# Patient Record
Sex: FEMALE | Race: WHITE | NOT HISPANIC OR LATINO | Employment: OTHER | URBAN - METROPOLITAN AREA
[De-identification: names, ages, dates, MRNs, and addresses within clinical notes are randomized per-mention and may not be internally consistent; named-entity substitution may affect disease eponyms.]

---

## 2017-02-01 ENCOUNTER — HOSPITAL ENCOUNTER (INPATIENT)
Facility: MEDICAL CENTER | Age: 62
LOS: 7 days | DRG: 101 | End: 2017-02-08
Attending: EMERGENCY MEDICINE | Admitting: INTERNAL MEDICINE
Payer: MEDICAID

## 2017-02-01 ENCOUNTER — RESOLUTE PROFESSIONAL BILLING HOSPITAL PROF FEE (OUTPATIENT)
Dept: PULMONOLOGY | Facility: HOSPICE | Age: 62
End: 2017-02-01
Payer: MEDICAID

## 2017-02-01 ENCOUNTER — APPOINTMENT (OUTPATIENT)
Dept: RADIOLOGY | Facility: MEDICAL CENTER | Age: 62
DRG: 101 | End: 2017-02-01
Attending: EMERGENCY MEDICINE
Payer: MEDICAID

## 2017-02-01 DIAGNOSIS — R53.81 DEBILITY: ICD-10-CM

## 2017-02-01 DIAGNOSIS — E87.1 HYPONATREMIA: ICD-10-CM

## 2017-02-01 DIAGNOSIS — R41.82 ALTERED MENTAL STATUS, UNSPECIFIED ALTERED MENTAL STATUS TYPE: ICD-10-CM

## 2017-02-01 PROBLEM — R40.4 ALTERED CONSCIOUSNESS: Status: ACTIVE | Noted: 2017-02-01

## 2017-02-01 LAB
ALBUMIN SERPL BCP-MCNC: 2.7 G/DL (ref 3.2–4.9)
ALBUMIN SERPL BCP-MCNC: 3.6 G/DL (ref 3.2–4.9)
ALBUMIN/GLOB SERPL: 1.2 G/DL
ALP SERPL-CCNC: 71 U/L (ref 30–99)
ALT SERPL-CCNC: 29 U/L (ref 2–50)
AMPHET UR QL SCN: NEGATIVE
ANION GAP SERPL CALC-SCNC: 10 MMOL/L (ref 0–11.9)
ANION GAP SERPL CALC-SCNC: 8 MMOL/L (ref 0–11.9)
APPEARANCE UR: CLEAR
AST SERPL-CCNC: 76 U/L (ref 12–45)
BACTERIA #/AREA URNS HPF: ABNORMAL /HPF
BARBITURATES UR QL SCN: NEGATIVE
BASOPHILS # BLD AUTO: 0.1 % (ref 0–1.8)
BASOPHILS # BLD: 0.01 K/UL (ref 0–0.12)
BENZODIAZ UR QL SCN: NEGATIVE
BILIRUB SERPL-MCNC: 1.2 MG/DL (ref 0.1–1.5)
BILIRUB UR QL STRIP.AUTO: NEGATIVE
BUN SERPL-MCNC: 6 MG/DL (ref 8–22)
BUN SERPL-MCNC: 7 MG/DL (ref 8–22)
BZE UR QL SCN: NEGATIVE
CALCIUM SERPL-MCNC: 6.7 MG/DL (ref 8.5–10.5)
CALCIUM SERPL-MCNC: 7.9 MG/DL (ref 8.5–10.5)
CANNABINOIDS UR QL SCN: POSITIVE
CHLORIDE SERPL-SCNC: 78 MMOL/L (ref 96–112)
CHLORIDE SERPL-SCNC: 90 MMOL/L (ref 96–112)
CHLORIDE UR-SCNC: 47 MMOL/L
CO2 SERPL-SCNC: 19 MMOL/L (ref 20–33)
CO2 SERPL-SCNC: 20 MMOL/L (ref 20–33)
COLOR UR: YELLOW
CREAT SERPL-MCNC: 0.21 MG/DL (ref 0.5–1.4)
CREAT SERPL-MCNC: 0.22 MG/DL (ref 0.5–1.4)
CREAT UR-MCNC: 44.6 MG/DL
CULTURE IF INDICATED INDCX: NO UA CULTURE
EOSINOPHIL # BLD AUTO: 0 K/UL (ref 0–0.51)
EOSINOPHIL NFR BLD: 0 % (ref 0–6.9)
ERYTHROCYTE [DISTWIDTH] IN BLOOD BY AUTOMATED COUNT: 38.5 FL (ref 35.9–50)
GFR SERPL CREATININE-BSD FRML MDRD: >60 ML/MIN/1.73 M 2
GFR SERPL CREATININE-BSD FRML MDRD: >60 ML/MIN/1.73 M 2
GLOBULIN SER CALC-MCNC: 2.9 G/DL (ref 1.9–3.5)
GLUCOSE SERPL-MCNC: 120 MG/DL (ref 65–99)
GLUCOSE SERPL-MCNC: 91 MG/DL (ref 65–99)
GLUCOSE UR STRIP.AUTO-MCNC: NEGATIVE MG/DL
GRAN CASTS #/AREA URNS LPF: ABNORMAL /LPF
HCT VFR BLD AUTO: 29 % (ref 37–47)
HGB BLD-MCNC: 10.5 G/DL (ref 12–16)
HYALINE CASTS #/AREA URNS LPF: ABNORMAL /LPF
IMM GRANULOCYTES # BLD AUTO: 0.06 K/UL (ref 0–0.11)
IMM GRANULOCYTES NFR BLD AUTO: 0.6 % (ref 0–0.9)
KETONES UR STRIP.AUTO-MCNC: 60 MG/DL
LEUKOCYTE ESTERASE UR QL STRIP.AUTO: NEGATIVE
LYMPHOCYTES # BLD AUTO: 0.69 K/UL (ref 1–4.8)
LYMPHOCYTES NFR BLD: 7 % (ref 22–41)
MCH RBC QN AUTO: 32.8 PG (ref 27–33)
MCHC RBC AUTO-ENTMCNC: 36.2 G/DL (ref 33.6–35)
MCV RBC AUTO: 90.6 FL (ref 81.4–97.8)
MDMA UR QL SCN: NEGATIVE
METHADONE UR QL SCN: NEGATIVE
MICRO URNS: ABNORMAL
MONOCYTES # BLD AUTO: 1.02 K/UL (ref 0–0.85)
MONOCYTES NFR BLD AUTO: 10.3 % (ref 0–13.4)
NEUTROPHILS # BLD AUTO: 8.08 K/UL (ref 2–7.15)
NEUTROPHILS NFR BLD: 82 % (ref 44–72)
NITRITE UR QL STRIP.AUTO: NEGATIVE
NRBC # BLD AUTO: 0 K/UL
NRBC BLD AUTO-RTO: 0 /100 WBC
OPIATES UR QL SCN: POSITIVE
OSMOLALITY SERPL: 227 MOSM/KG H2O (ref 278–298)
OSMOLALITY UR: 501 MOSM/KG H2O (ref 300–900)
OXYCODONE UR QL SCN: NEGATIVE
PCP UR QL SCN: NEGATIVE
PH UR STRIP.AUTO: 6 [PH]
PLATELET # BLD AUTO: 172 K/UL (ref 164–446)
PMV BLD AUTO: 10.2 FL (ref 9–12.9)
POTASSIUM SERPL-SCNC: 3 MMOL/L (ref 3.6–5.5)
POTASSIUM SERPL-SCNC: 3.7 MMOL/L (ref 3.6–5.5)
POTASSIUM UR-SCNC: 64.1 MMOL/L
PROPOXYPH UR QL SCN: NEGATIVE
PROT SERPL-MCNC: 6.5 G/DL (ref 6–8.2)
PROT UR QL STRIP: 70 MG/DL
PROT UR-MCNC: 113.8 MG/DL (ref 0–15)
RBC # BLD AUTO: 3.2 M/UL (ref 4.2–5.4)
RBC # URNS HPF: ABNORMAL /HPF
RBC UR QL AUTO: ABNORMAL
SODIUM SERPL-SCNC: 108 MMOL/L (ref 135–145)
SODIUM SERPL-SCNC: 117 MMOL/L (ref 135–145)
SODIUM UR-SCNC: 37 MMOL/L
SP GR UR STRIP.AUTO: 1.02
TSH SERPL DL<=0.005 MIU/L-ACNC: 0.95 UIU/ML (ref 0.3–3.7)
WBC # BLD AUTO: 9.7 K/UL (ref 4.8–10.8)
WBC #/AREA URNS HPF: ABNORMAL /HPF

## 2017-02-01 PROCEDURE — 80053 COMPREHEN METABOLIC PANEL: CPT

## 2017-02-01 PROCEDURE — 36556 INSERT NON-TUNNEL CV CATH: CPT | Mod: GC | Performed by: INTERNAL MEDICINE

## 2017-02-01 PROCEDURE — 82436 ASSAY OF URINE CHLORIDE: CPT

## 2017-02-01 PROCEDURE — 36415 COLL VENOUS BLD VENIPUNCTURE: CPT

## 2017-02-01 PROCEDURE — 99291 CRITICAL CARE FIRST HOUR: CPT | Mod: 25,GC | Performed by: INTERNAL MEDICINE

## 2017-02-01 PROCEDURE — 96361 HYDRATE IV INFUSION ADD-ON: CPT

## 2017-02-01 PROCEDURE — 700111 HCHG RX REV CODE 636 W/ 250 OVERRIDE (IP): Performed by: HOSPITALIST

## 2017-02-01 PROCEDURE — 99285 EMERGENCY DEPT VISIT HI MDM: CPT

## 2017-02-01 PROCEDURE — 93005 ELECTROCARDIOGRAM TRACING: CPT | Performed by: EMERGENCY MEDICINE

## 2017-02-01 PROCEDURE — 700105 HCHG RX REV CODE 258: Performed by: EMERGENCY MEDICINE

## 2017-02-01 PROCEDURE — 84443 ASSAY THYROID STIM HORMONE: CPT

## 2017-02-01 PROCEDURE — 80048 BASIC METABOLIC PNL TOTAL CA: CPT

## 2017-02-01 PROCEDURE — 700105 HCHG RX REV CODE 258: Performed by: INTERNAL MEDICINE

## 2017-02-01 PROCEDURE — 82570 ASSAY OF URINE CREATININE: CPT

## 2017-02-01 PROCEDURE — 84156 ASSAY OF PROTEIN URINE: CPT

## 2017-02-01 PROCEDURE — 70450 CT HEAD/BRAIN W/O DYE: CPT

## 2017-02-01 PROCEDURE — 83930 ASSAY OF BLOOD OSMOLALITY: CPT

## 2017-02-01 PROCEDURE — 82040 ASSAY OF SERUM ALBUMIN: CPT

## 2017-02-01 PROCEDURE — 81001 URINALYSIS AUTO W/SCOPE: CPT

## 2017-02-01 PROCEDURE — 84133 ASSAY OF URINE POTASSIUM: CPT

## 2017-02-01 PROCEDURE — 83935 ASSAY OF URINE OSMOLALITY: CPT

## 2017-02-01 PROCEDURE — 80307 DRUG TEST PRSMV CHEM ANLYZR: CPT

## 2017-02-01 PROCEDURE — 84300 ASSAY OF URINE SODIUM: CPT

## 2017-02-01 PROCEDURE — 85025 COMPLETE CBC W/AUTO DIFF WBC: CPT

## 2017-02-01 PROCEDURE — 96374 THER/PROPH/DIAG INJ IV PUSH: CPT

## 2017-02-01 PROCEDURE — 770022 HCHG ROOM/CARE - ICU (200)

## 2017-02-01 PROCEDURE — 31500 INSERT EMERGENCY AIRWAY: CPT | Mod: GC | Performed by: INTERNAL MEDICINE

## 2017-02-01 RX ORDER — LORAZEPAM 2 MG/ML
0.5 INJECTION INTRAMUSCULAR ONCE
Status: DISCONTINUED | OUTPATIENT
Start: 2017-02-01 | End: 2017-02-02

## 2017-02-01 RX ORDER — LORAZEPAM 2 MG/ML
2 INJECTION INTRAMUSCULAR ONCE
Status: COMPLETED | OUTPATIENT
Start: 2017-02-01 | End: 2017-02-01

## 2017-02-01 RX ORDER — 3% SODIUM CHLORIDE 3 G/100ML
250 INJECTION, SOLUTION INTRAVENOUS ONCE
Status: COMPLETED | OUTPATIENT
Start: 2017-02-01 | End: 2017-02-01

## 2017-02-01 RX ORDER — MORPHINE SULFATE 4 MG/ML
2 INJECTION, SOLUTION INTRAMUSCULAR; INTRAVENOUS EVERY 4 HOURS PRN
Status: DISCONTINUED | OUTPATIENT
Start: 2017-02-01 | End: 2017-02-02

## 2017-02-01 RX ORDER — CALCIUM GLUCONATE 94 MG/ML
1 INJECTION, SOLUTION INTRAVENOUS ONCE
Status: DISCONTINUED | OUTPATIENT
Start: 2017-02-01 | End: 2017-02-02

## 2017-02-01 RX ORDER — POTASSIUM CHLORIDE 7.45 MG/ML
10 INJECTION INTRAVENOUS
Status: COMPLETED | OUTPATIENT
Start: 2017-02-02 | End: 2017-02-02

## 2017-02-01 RX ORDER — ROCURONIUM BROMIDE 10 MG/ML
50 INJECTION, SOLUTION INTRAVENOUS ONCE
Status: COMPLETED | OUTPATIENT
Start: 2017-02-02 | End: 2017-02-02

## 2017-02-01 RX ORDER — BISACODYL 10 MG
10 SUPPOSITORY, RECTAL RECTAL
Status: DISCONTINUED | OUTPATIENT
Start: 2017-02-01 | End: 2017-02-08 | Stop reason: HOSPADM

## 2017-02-01 RX ORDER — ENEMA 19; 7 G/133ML; G/133ML
1 ENEMA RECTAL
Status: DISCONTINUED | OUTPATIENT
Start: 2017-02-01 | End: 2017-02-08 | Stop reason: HOSPADM

## 2017-02-01 RX ORDER — LACTULOSE 20 G/30ML
30 SOLUTION ORAL
Status: DISCONTINUED | OUTPATIENT
Start: 2017-02-01 | End: 2017-02-08 | Stop reason: HOSPADM

## 2017-02-01 RX ORDER — LORAZEPAM 2 MG/ML
INJECTION INTRAMUSCULAR
Status: DISPENSED
Start: 2017-02-01 | End: 2017-02-02

## 2017-02-01 RX ORDER — LORAZEPAM 2 MG/ML
1 INJECTION INTRAMUSCULAR
Status: DISCONTINUED | OUTPATIENT
Start: 2017-02-01 | End: 2017-02-02

## 2017-02-01 RX ORDER — LORAZEPAM 2 MG/ML
0.5 INJECTION INTRAMUSCULAR EVERY 6 HOURS PRN
Status: DISCONTINUED | OUTPATIENT
Start: 2017-02-01 | End: 2017-02-01

## 2017-02-01 RX ORDER — AMOXICILLIN 250 MG
1 CAPSULE ORAL
Status: DISCONTINUED | OUTPATIENT
Start: 2017-02-01 | End: 2017-02-08 | Stop reason: HOSPADM

## 2017-02-01 RX ORDER — LORAZEPAM 1 MG/1
0.5 TABLET ORAL EVERY 6 HOURS PRN
Status: DISCONTINUED | OUTPATIENT
Start: 2017-02-01 | End: 2017-02-01

## 2017-02-01 RX ORDER — DOCUSATE SODIUM 100 MG/1
100 CAPSULE, LIQUID FILLED ORAL EVERY MORNING
Status: DISCONTINUED | OUTPATIENT
Start: 2017-02-01 | End: 2017-02-08 | Stop reason: HOSPADM

## 2017-02-01 RX ORDER — TEMAZEPAM 30 MG/1
30 CAPSULE ORAL NIGHTLY PRN
COMMUNITY

## 2017-02-01 RX ORDER — 3% SODIUM CHLORIDE 3 G/100ML
INJECTION, SOLUTION INTRAVENOUS CONTINUOUS
Status: DISCONTINUED | OUTPATIENT
Start: 2017-02-01 | End: 2017-02-01

## 2017-02-01 RX ORDER — AMOXICILLIN 250 MG
1 CAPSULE ORAL NIGHTLY
Status: DISCONTINUED | OUTPATIENT
Start: 2017-02-01 | End: 2017-02-08 | Stop reason: HOSPADM

## 2017-02-01 RX ORDER — PROPOFOL 10 MG/ML
50 INJECTION, EMULSION INTRAVENOUS ONCE
Status: COMPLETED | OUTPATIENT
Start: 2017-02-02 | End: 2017-02-02

## 2017-02-01 RX ORDER — SODIUM CHLORIDE 9 MG/ML
1000 INJECTION, SOLUTION INTRAVENOUS ONCE
Status: COMPLETED | OUTPATIENT
Start: 2017-02-01 | End: 2017-02-01

## 2017-02-01 RX ORDER — HYDROCODONE BITARTRATE AND ACETAMINOPHEN 10; 325 MG/1; MG/1
1-2 TABLET ORAL EVERY 6 HOURS PRN
Status: ON HOLD | COMMUNITY
End: 2017-02-08

## 2017-02-01 RX ADMIN — SODIUM CHLORIDE 1000 ML: 9 INJECTION, SOLUTION INTRAVENOUS at 19:50

## 2017-02-01 RX ADMIN — SODIUM CHLORIDE 250 ML: 3 INJECTION, SOLUTION INTRAVENOUS at 21:15

## 2017-02-01 RX ADMIN — LORAZEPAM 2 MG: 2 INJECTION INTRAMUSCULAR; INTRAVENOUS at 23:35

## 2017-02-01 ASSESSMENT — LIFESTYLE VARIABLES: DO YOU DRINK ALCOHOL: NO

## 2017-02-01 NOTE — IP AVS SNAPSHOT
2/8/2017          Fabiola Chacon  [address] [place] [address]    Dear Fabiola:    [facility] wants to ensure your discharge home is safe and you or your loved ones have had all your questions answered regarding your care after you leave the hospital.    You may receive a telephone call within two days of your discharge.  This call is to make certain you understand your discharge instructions as well as ensure we provided you with the best care possible during your stay with us.     The call will only last approximately 3-5 minutes and will be done by a nurse.    Once again, we want to ensure your discharge home is safe and that you have a clear understanding of any next steps in your care.  If you have any questions or concerns, please do not hesitate to contact us, we are here for you.  Thank you for choosing [facility] for your healthcare needs.    Sincerely,    Leandro Valdovinos    [facility]

## 2017-02-01 NOTE — IP AVS SNAPSHOT
Home Care Instructions                                                                                                                  Name:Fabiola Chacon  Medical Record Number:[id number]  CSN: [id number]    YOB: 1955   Age: 61 y.o.  Sex: female  HT:1.524 m (5') WT: 46.9 kg (103 lb 6.3 oz)          Admit Date: 2/1/2017     Discharge Date:   Today's Date: 2/8/2017  Attending Doctor:  KRYSTYNA Merida                  Allergies:  Codeine; Ultram; and Percocet            Discharge Instructions       Discharge Instructions    Discharged to home by car with escort. Discharged via wheelchair, hospital escort: Yes.  Special equipment needed: Not Applicable    Be sure to schedule a follow-up appointment with your primary care doctor or any specialists as instructed.     Discharge Plan:   Influenza Vaccine Indication: Patient Refuses    I understand that a diet low in cholesterol, fat, and sodium is recommended for good health. Unless I have been given specific instructions below for another diet, I accept this instruction as my diet prescription.   Other diet: Reg      Special Instructions: None    · Is patient discharged on Warfarin / Coumadin?   No     · Is patient Post Blood Transfusion?  No    Depression / Suicide Risk    As you are discharged from this [facility] facility, it is important to learn how to keep safe from harming yourself.    Recognize the warning signs:  · Abrupt changes in personality, positive or negative- including increase in energy   · Giving away possessions  · Change in eating patterns- significant weight changes-  positive or negative  · Change in sleeping patterns- unable to sleep or sleeping all the time   · Unwillingness or inability to communicate  · Depression  · Unusual sadness, discouragement and loneliness  · Talk of wanting to die  · Neglect of personal appearance   · Rebelliousness- reckless behavior  · Withdrawal from people/activities they love  · Confusion-  inability to concentrate     If you or a loved one observes any of these behaviors or has concerns about self-harm, here's what you can do:  · Talk about it- your feelings and reasons for harming yourself  · Remove any means that you might use to hurt yourself (examples: pills, rope, extension cords, firearm)  · Get professional help from the community (Mental Health, Substance Abuse, psychological counseling)  · Do not be alone:Call your Safe Contact- someone whom you trust who will be there for you.  · Call your local CRISIS HOTLINE 331-5209 or 923-301-0001  · Call your local Children's Mobile Crisis Response Team [place] (279) 369-7411 or www.[employer]  · Call the toll free National Suicide Prevention Hotlines   · National Suicide Prevention Lifeline 489-272-HEEH (7419)  · [employer] Line Network 800-SUICIDE (442-8610)           Discharge Medication Instructions:    Below are the medications your physician expects you to take upon discharge:    Review all your home medications and newly ordered medications with your doctor and/or pharmacist. Follow medication instructions as directed by your doctor and/or pharmacist.    Please keep your medication list with you and share with your physician.               Medication List      CONTINUE taking these medications        Instructions    Acetaminophen 650 MG Tabs   Last time this was given:  500 mg on 2/3/2017  4:02 PM    Take 650 mg by mouth every 6 hours as needed for Fever or Mild Pain (Temp greater than 100.5 F or Mild Pain (1-3)).   Dose:  650 mg       hydrocodone/acetaminophen  MG Tabs   Commonly known as:  NORCO    Take 1-2 Tabs by mouth every 6 hours as needed.   Dose:  1-2 Tab       RESTORIL 30 MG capsule   Last time this was given:  15 mg on 2/8/2017  8:07 AM   Generic drug:  temazepam    Take 30 mg by mouth at bedtime as needed for Sleep.   Dose:  30 mg               Instructions           Diet / Nutrition:    Follow any diet  instructions given to you by your doctor or the dietician, including how much salt (sodium) you are allowed each day.    If you are overweight, talk to your doctor about a weight reduction plan.    Activity:    Remain physically active following your doctor's instructions about exercise and activity.    Rest often.     Any time you become even a little tired or short of breath, SIT DOWN and rest.    Worsening Symptoms:    Report any of the following signs and symptoms to the doctor's office immediately:    *Pain of jaw, arm, or neck  *Chest pain not relieved by medication                               *Dizziness or loss of consciousness  *Difficulty breathing even when at rest   *More tired than usual                                       *Bleeding drainage or swelling of surgical site  *Swelling of feet, ankles, legs or stomach                 *Fever (>100ºF)  *Pink or blood tinged sputum  *Weight gain (3lbs/day or 5lbs /week)           *Shock from internal defibrillator (if applicable)  *Palpitations or irregular heartbeats                *Cool and/or numb extremities    Stroke Awareness    Common Risk Factors for Stroke include:    Age  Atrial Fibrillation  Carotid Artery Stenosis  Diabetes Mellitus  Excessive alcohol consumption  High blood pressure  Overweight   Physical inactivity  Smoking    Warning signs and symptoms of a stroke include:    *Sudden numbness or weakness of the face, arm or leg (especially on one side of the body).  *Sudden confusion, trouble speaking or understanding.  *Sudden trouble seeing in one or both eyes.  *Sudden trouble walking, dizziness, loss of balance or coordination.Sudden severe headache with no known cause.    It is very important to get treatment quickly when a stroke occurs. If you experience any of the above warning signs, call 911 immediately.                   Disclaimer         Quit Smoking / Tobacco Use:    I understand the use of any tobacco products increases my  chance of suffering from future heart disease or stroke and could cause other illnesses which may shorten my life. Quitting the use of tobacco products is the single most important thing I can do to improve my health. For further information on smoking / tobacco cessation call a Toll Free Quit Line at 1-621.510.1244 (*National Cancer [place]) or 1-309.380.8744 (American Lung Association) or you can access the web based program at www.lungusa.org.    [place] Tobacco Users Help Line:  (493) 863-9312       Toll Free: 1-833.587.2333  Quit Tobacco Program [facility] (583)849-9634    Crisis Hotline:    [place] Crisis Hotline:  0-218-TURXHMW or 1-911.579.3177    [place] Crisis Hotline:    1-826.274.3583 or 586-932-1500    Discharge Survey:   Thank you for choosing [facility]. We hope we did everything we could to make your hospital stay a pleasant one. You may be receiving a phone survey and we would appreciate your time and participation in answering the questions. Your input is very valuable to us in our efforts to improve our service to our patients and their families.        My signature on this form indicates that:    1. I have reviewed and understand the above information.  2. My questions regarding this information have been answered to my satisfaction.  3. I have formulated a plan with my discharge nurse to obtain my prescribed medications for home.                  Disclaimer         __________________________________                     __________       ________                       Patient Signature                                                 Date                    Time

## 2017-02-01 NOTE — IP AVS SNAPSHOT
G-volution Access Code: [email or web address]  Expires: 3/10/2017  5:39 PM    Your email address is not on file at [facility].  Email Addresses are required for you to sign up for G-volution, please contact [phone] to verify your personal information and to provide your email address prior to attempting to register for G-volution.    Fabiola Craig  [address] [place] [address]    G-volution  A secure, online tool to manage your health information     [facility]’s G-volution® is a secure, online tool that connects you to your personalized health information from the privacy of your home -- day or night - making it very easy for you to manage your healthcare. Once the activation process is completed, you can even access your medical information using the G-volution gerardo, which is available for free in the Apple Gerardo store or Google Play store.     To learn more about G-volution, visit [email or web address]    There are two levels of access available (as shown below):   My Chart Features  [facility] Primary Care Doctor [facility]  Specialists [facility]  Urgent  Care Non-[facility] Primary Care Doctor   Email your healthcare team securely and privately 24/7 X X X    Manage appointments: schedule your next appointment; view details of past/upcoming appointments X      Request prescription refills. X      View recent personal medical records, including lab and immunizations X X X X   View health record, including health history, allergies, medications X X X X   Read reports about your outpatient visits, procedures, consult and ER notes X X X X   See your discharge summary, which is a recap of your hospital and/or ER visit that includes your diagnosis, lab results, and care plan X X  X     How to register for G-volution:  Once your e-mail address has been verified, follow the following steps to sign up for G-volution.     1. Go to  [email or web address]  2. Click on the Sign Up Now box, which takes you to the New Member Sign Up page. You will need  to provide the following information:  a. Enter your Today Tix Access Code exactly as it appears at the top of this page. (You will not need to use this code after you’ve completed the sign-up process. If you do not sign up before the expiration date, you must request a new code.)   b. Enter your date of birth.   c. Enter your home email address.   d. Click Submit, and follow the next screen’s instructions.  3. Create a Today Tix ID. This will be your Today Tix login ID and cannot be changed, so think of one that is secure and easy to remember.  4. Create a Today Tix password. You can change your password at any time.  5. Enter your Password Reset Question and Answer. This can be used at a later time if you forget your password.   6. Enter your e-mail address. This allows you to receive e-mail notifications when new information is available in Today Tix.  7. Click Sign Up. You can now view your health information.    For assistance activating your Today Tix account, call [phone]

## 2017-02-01 NOTE — IP AVS SNAPSHOT
" <p align=\"LEFT\"><IMG SRC=\"//[email or web address]$/[email or web address]\" alt=\"Image\" WIDTH=\"50%\" HEIGHT=\"200\" BORDER=\"\"></p>                   Name:Fabiola Chacon  Medical Record Number:[id number]  CSN: [id number]    YOB: 1955   Age: 61 y.o.  Sex: female  HT:1.524 m (5') WT: 46.9 kg (103 lb 6.3 oz)          Admit Date: 2/1/2017     Discharge Date:   Today's Date: 2/8/2017  Attending Doctor:  KRYSTYNA Meirda                  Allergies:  Codeine; Ultram; and Percocet             Medication List      Take these Medications        Instructions    Acetaminophen 650 MG Tabs    Take 650 mg by mouth every 6 hours as needed for Fever or Mild Pain (Temp greater than 100.5 F or Mild Pain (1-3)).   Dose:  650 mg       hydrocodone/acetaminophen  MG Tabs   Commonly known as:  NORCO    Take 1-2 Tabs by mouth every 6 hours as needed.   Dose:  1-2 Tab       RESTORIL 30 MG capsule   Generic drug:  temazepam    Take 30 mg by mouth at bedtime as needed for Sleep.   Dose:  30 mg         "

## 2017-02-02 ENCOUNTER — APPOINTMENT (OUTPATIENT)
Dept: RADIOLOGY | Facility: MEDICAL CENTER | Age: 62
DRG: 101 | End: 2017-02-02
Attending: INTERNAL MEDICINE
Payer: MEDICAID

## 2017-02-02 LAB
ANION GAP SERPL CALC-SCNC: 4 MMOL/L (ref 0–11.9)
ANION GAP SERPL CALC-SCNC: 5 MMOL/L (ref 0–11.9)
ANION GAP SERPL CALC-SCNC: 5 MMOL/L (ref 0–11.9)
ANION GAP SERPL CALC-SCNC: 6 MMOL/L (ref 0–11.9)
ANION GAP SERPL CALC-SCNC: 6 MMOL/L (ref 0–11.9)
ANION GAP SERPL CALC-SCNC: 7 MMOL/L (ref 0–11.9)
ANION GAP SERPL CALC-SCNC: 9 MMOL/L (ref 0–11.9)
BASE EXCESS BLDA CALC-SCNC: -2 MMOL/L (ref -4–3)
BASE EXCESS BLDA CALC-SCNC: -6 MMOL/L (ref -4–3)
BASOPHILS # BLD AUTO: 0.2 % (ref 0–1.8)
BASOPHILS # BLD: 0.03 K/UL (ref 0–0.12)
BODY TEMPERATURE: ABNORMAL DEGREES
BODY TEMPERATURE: ABNORMAL DEGREES
BUN SERPL-MCNC: 6 MG/DL (ref 8–22)
BUN SERPL-MCNC: 7 MG/DL (ref 8–22)
CALCIUM SERPL-MCNC: 7.9 MG/DL (ref 8.5–10.5)
CALCIUM SERPL-MCNC: 7.9 MG/DL (ref 8.5–10.5)
CALCIUM SERPL-MCNC: 8.1 MG/DL (ref 8.5–10.5)
CALCIUM SERPL-MCNC: 8.1 MG/DL (ref 8.5–10.5)
CALCIUM SERPL-MCNC: 8.2 MG/DL (ref 8.5–10.5)
CALCIUM SERPL-MCNC: 8.2 MG/DL (ref 8.5–10.5)
CALCIUM SERPL-MCNC: 8.4 MG/DL (ref 8.5–10.5)
CHLORIDE SERPL-SCNC: 86 MMOL/L (ref 96–112)
CHLORIDE SERPL-SCNC: 87 MMOL/L (ref 96–112)
CHLORIDE SERPL-SCNC: 88 MMOL/L (ref 96–112)
CHLORIDE SERPL-SCNC: 89 MMOL/L (ref 96–112)
CHLORIDE SERPL-SCNC: 92 MMOL/L (ref 96–112)
CHLORIDE SERPL-SCNC: 92 MMOL/L (ref 96–112)
CHLORIDE SERPL-SCNC: 93 MMOL/L (ref 96–112)
CO2 BLDA-SCNC: 21 MMOL/L (ref 20–33)
CO2 BLDA-SCNC: 22 MMOL/L (ref 20–33)
CO2 SERPL-SCNC: 20 MMOL/L (ref 20–33)
CO2 SERPL-SCNC: 23 MMOL/L (ref 20–33)
CO2 SERPL-SCNC: 24 MMOL/L (ref 20–33)
CO2 SERPL-SCNC: 25 MMOL/L (ref 20–33)
CREAT SERPL-MCNC: 0.21 MG/DL (ref 0.5–1.4)
CREAT SERPL-MCNC: 0.24 MG/DL (ref 0.5–1.4)
CREAT SERPL-MCNC: 0.25 MG/DL (ref 0.5–1.4)
CREAT SERPL-MCNC: 0.27 MG/DL (ref 0.5–1.4)
CREAT SERPL-MCNC: 0.29 MG/DL (ref 0.5–1.4)
CREAT SERPL-MCNC: 0.31 MG/DL (ref 0.5–1.4)
CREAT SERPL-MCNC: 0.38 MG/DL (ref 0.5–1.4)
EOSINOPHIL # BLD AUTO: 0 K/UL (ref 0–0.51)
EOSINOPHIL NFR BLD: 0 % (ref 0–6.9)
ERYTHROCYTE [DISTWIDTH] IN BLOOD BY AUTOMATED COUNT: 41.1 FL (ref 35.9–50)
GFR SERPL CREATININE-BSD FRML MDRD: >60 ML/MIN/1.73 M 2
GLUCOSE SERPL-MCNC: 107 MG/DL (ref 65–99)
GLUCOSE SERPL-MCNC: 110 MG/DL (ref 65–99)
GLUCOSE SERPL-MCNC: 114 MG/DL (ref 65–99)
GLUCOSE SERPL-MCNC: 117 MG/DL (ref 65–99)
GLUCOSE SERPL-MCNC: 133 MG/DL (ref 65–99)
GLUCOSE SERPL-MCNC: 143 MG/DL (ref 65–99)
GLUCOSE SERPL-MCNC: 169 MG/DL (ref 65–99)
GRAM STN SPEC: NORMAL
HCO3 BLDA-SCNC: 19.4 MMOL/L (ref 17–25)
HCO3 BLDA-SCNC: 21.3 MMOL/L (ref 17–25)
HCT VFR BLD AUTO: 34.9 % (ref 37–47)
HGB BLD-MCNC: 12.9 G/DL (ref 12–16)
IMM GRANULOCYTES # BLD AUTO: 0.06 K/UL (ref 0–0.11)
IMM GRANULOCYTES NFR BLD AUTO: 0.5 % (ref 0–0.9)
LACTATE BLD-SCNC: 1.1 MMOL/L (ref 0.5–2)
LYMPHOCYTES # BLD AUTO: 0.74 K/UL (ref 1–4.8)
LYMPHOCYTES NFR BLD: 6 % (ref 22–41)
MCH RBC QN AUTO: 34.2 PG (ref 27–33)
MCHC RBC AUTO-ENTMCNC: 37 G/DL (ref 33.6–35)
MCV RBC AUTO: 92.6 FL (ref 81.4–97.8)
MONOCYTES # BLD AUTO: 1.2 K/UL (ref 0–0.85)
MONOCYTES NFR BLD AUTO: 9.7 % (ref 0–13.4)
NEUTROPHILS # BLD AUTO: 10.35 K/UL (ref 2–7.15)
NEUTROPHILS NFR BLD: 83.6 % (ref 44–72)
NRBC # BLD AUTO: 0 K/UL
NRBC BLD AUTO-RTO: 0 /100 WBC
O2/TOTAL GAS SETTING VFR VENT: 65 %
O2/TOTAL GAS SETTING VFR VENT: 70 %
PCO2 BLDA: 31.1 MMHG (ref 26–37)
PCO2 BLDA: 37.7 MMHG (ref 26–37)
PCO2 TEMP ADJ BLDA: 31.2 MMHG (ref 26–37)
PCO2 TEMP ADJ BLDA: 38.9 MMHG (ref 26–37)
PH BLDA: 7.32 [PH] (ref 7.4–7.5)
PH BLDA: 7.44 [PH] (ref 7.4–7.5)
PH TEMP ADJ BLDA: 7.31 [PH] (ref 7.4–7.5)
PH TEMP ADJ BLDA: 7.44 [PH] (ref 7.4–7.5)
PLATELET # BLD AUTO: 183 K/UL (ref 164–446)
PMV BLD AUTO: 9.8 FL (ref 9–12.9)
PO2 BLDA: 282 MMHG (ref 64–87)
PO2 BLDA: 56 MMHG (ref 64–87)
PO2 TEMP ADJ BLDA: 282 MMHG (ref 64–87)
PO2 TEMP ADJ BLDA: 58 MMHG (ref 64–87)
POTASSIUM SERPL-SCNC: 3.4 MMOL/L (ref 3.6–5.5)
POTASSIUM SERPL-SCNC: 3.7 MMOL/L (ref 3.6–5.5)
POTASSIUM SERPL-SCNC: 3.7 MMOL/L (ref 3.6–5.5)
POTASSIUM SERPL-SCNC: 3.8 MMOL/L (ref 3.6–5.5)
POTASSIUM SERPL-SCNC: 4 MMOL/L (ref 3.6–5.5)
PREALB SERPL-MCNC: 19 MG/DL (ref 18–38)
RBC # BLD AUTO: 3.77 M/UL (ref 4.2–5.4)
SAO2 % BLDA: 100 % (ref 93–99)
SAO2 % BLDA: 86 % (ref 93–99)
SIGNIFICANT IND 70042: NORMAL
SITE SITE: NORMAL
SODIUM SERPL-SCNC: 115 MMOL/L (ref 135–145)
SODIUM SERPL-SCNC: 115 MMOL/L (ref 135–145)
SODIUM SERPL-SCNC: 118 MMOL/L (ref 135–145)
SODIUM SERPL-SCNC: 118 MMOL/L (ref 135–145)
SODIUM SERPL-SCNC: 120 MMOL/L (ref 135–145)
SODIUM SERPL-SCNC: 122 MMOL/L (ref 135–145)
SODIUM SERPL-SCNC: 122 MMOL/L (ref 135–145)
SOURCE SOURCE: NORMAL
SPECIMEN DRAWN FROM PATIENT: ABNORMAL
SPECIMEN DRAWN FROM PATIENT: ABNORMAL
TRIGL SERPL-MCNC: 97 MG/DL (ref 0–149)
WBC # BLD AUTO: 12.4 K/UL (ref 4.8–10.8)

## 2017-02-02 PROCEDURE — C1751 CATH, INF, PER/CENT/MIDLINE: HCPCS

## 2017-02-02 PROCEDURE — 36600 WITHDRAWAL OF ARTERIAL BLOOD: CPT

## 2017-02-02 PROCEDURE — 36556 INSERT NON-TUNNEL CV CATH: CPT

## 2017-02-02 PROCEDURE — 700102 HCHG RX REV CODE 250 W/ 637 OVERRIDE(OP): Performed by: INTERNAL MEDICINE

## 2017-02-02 PROCEDURE — 770022 HCHG ROOM/CARE - ICU (200)

## 2017-02-02 PROCEDURE — A9270 NON-COVERED ITEM OR SERVICE: HCPCS | Performed by: HOSPITALIST

## 2017-02-02 PROCEDURE — 76937 US GUIDE VASCULAR ACCESS: CPT

## 2017-02-02 PROCEDURE — A9270 NON-COVERED ITEM OR SERVICE: HCPCS | Performed by: INTERNAL MEDICINE

## 2017-02-02 PROCEDURE — 87186 SC STD MICRODIL/AGAR DIL: CPT

## 2017-02-02 PROCEDURE — 80048 BASIC METABOLIC PNL TOTAL CA: CPT | Mod: 91

## 2017-02-02 PROCEDURE — 700111 HCHG RX REV CODE 636 W/ 250 OVERRIDE (IP): Performed by: INTERNAL MEDICINE

## 2017-02-02 PROCEDURE — 84134 ASSAY OF PREALBUMIN: CPT

## 2017-02-02 PROCEDURE — 85025 COMPLETE CBC W/AUTO DIFF WBC: CPT

## 2017-02-02 PROCEDURE — 83605 ASSAY OF LACTIC ACID: CPT

## 2017-02-02 PROCEDURE — 31500 INSERT EMERGENCY AIRWAY: CPT

## 2017-02-02 PROCEDURE — 92950 HEART/LUNG RESUSCITATION CPR: CPT

## 2017-02-02 PROCEDURE — 5A1945Z RESPIRATORY VENTILATION, 24-96 CONSECUTIVE HOURS: ICD-10-PCS | Performed by: INTERNAL MEDICINE

## 2017-02-02 PROCEDURE — 5A12012 PERFORMANCE OF CARDIAC OUTPUT, SINGLE, MANUAL: ICD-10-PCS | Performed by: INTERNAL MEDICINE

## 2017-02-02 PROCEDURE — 700105 HCHG RX REV CODE 258: Performed by: INTERNAL MEDICINE

## 2017-02-02 PROCEDURE — 02HV33Z INSERTION OF INFUSION DEVICE INTO SUPERIOR VENA CAVA, PERCUTANEOUS APPROACH: ICD-10-PCS | Performed by: INTERNAL MEDICINE

## 2017-02-02 PROCEDURE — 71010 DX-CHEST-PORTABLE (1 VIEW): CPT

## 2017-02-02 PROCEDURE — 84478 ASSAY OF TRIGLYCERIDES: CPT

## 2017-02-02 PROCEDURE — 99291 CRITICAL CARE FIRST HOUR: CPT | Mod: GC | Performed by: INTERNAL MEDICINE

## 2017-02-02 PROCEDURE — 700101 HCHG RX REV CODE 250: Performed by: INTERNAL MEDICINE

## 2017-02-02 PROCEDURE — 700111 HCHG RX REV CODE 636 W/ 250 OVERRIDE (IP): Performed by: HOSPITALIST

## 2017-02-02 PROCEDURE — 700102 HCHG RX REV CODE 250 W/ 637 OVERRIDE(OP): Performed by: HOSPITALIST

## 2017-02-02 PROCEDURE — 700111 HCHG RX REV CODE 636 W/ 250 OVERRIDE (IP)

## 2017-02-02 PROCEDURE — 87070 CULTURE OTHR SPECIMN AEROBIC: CPT

## 2017-02-02 PROCEDURE — 87205 SMEAR GRAM STAIN: CPT

## 2017-02-02 PROCEDURE — 94002 VENT MGMT INPAT INIT DAY: CPT

## 2017-02-02 PROCEDURE — 0BH17EZ INSERTION OF ENDOTRACHEAL AIRWAY INTO TRACHEA, VIA NATURAL OR ARTIFICIAL OPENING: ICD-10-PCS | Performed by: INTERNAL MEDICINE

## 2017-02-02 PROCEDURE — 87077 CULTURE AEROBIC IDENTIFY: CPT

## 2017-02-02 PROCEDURE — 82803 BLOOD GASES ANY COMBINATION: CPT

## 2017-02-02 RX ORDER — LORAZEPAM 2 MG/ML
4 INJECTION INTRAMUSCULAR
Status: DISCONTINUED | OUTPATIENT
Start: 2017-02-02 | End: 2017-02-08

## 2017-02-02 RX ORDER — LIDOCAINE HYDROCHLORIDE 10 MG/ML
1-2 INJECTION, SOLUTION INFILTRATION; PERINEURAL
Status: DISCONTINUED | OUTPATIENT
Start: 2017-02-02 | End: 2017-02-04

## 2017-02-02 RX ORDER — POTASSIUM CHLORIDE 14.9 MG/ML
20 INJECTION INTRAVENOUS ONCE
Status: COMPLETED | OUTPATIENT
Start: 2017-02-02 | End: 2017-02-02

## 2017-02-02 RX ORDER — SODIUM CHLORIDE 450 MG/100ML
INJECTION, SOLUTION INTRAVENOUS CONTINUOUS
Status: DISCONTINUED | OUTPATIENT
Start: 2017-02-02 | End: 2017-02-02

## 2017-02-02 RX ORDER — BISACODYL 10 MG
10 SUPPOSITORY, RECTAL RECTAL
Status: DISCONTINUED | OUTPATIENT
Start: 2017-02-02 | End: 2017-02-02

## 2017-02-02 RX ORDER — LORAZEPAM 2 MG/ML
5 INJECTION INTRAMUSCULAR
Status: DISCONTINUED | OUTPATIENT
Start: 2017-02-02 | End: 2017-02-08

## 2017-02-02 RX ORDER — HEPARIN SODIUM 5000 [USP'U]/ML
5000 INJECTION, SOLUTION INTRAVENOUS; SUBCUTANEOUS EVERY 8 HOURS
Status: DISCONTINUED | OUTPATIENT
Start: 2017-02-02 | End: 2017-02-02

## 2017-02-02 RX ORDER — SODIUM CHLORIDE 9 MG/ML
INJECTION, SOLUTION INTRAVENOUS CONTINUOUS
Status: DISCONTINUED | OUTPATIENT
Start: 2017-02-02 | End: 2017-02-02

## 2017-02-02 RX ORDER — ENEMA 19; 7 G/133ML; G/133ML
1 ENEMA RECTAL
Status: DISCONTINUED | OUTPATIENT
Start: 2017-02-02 | End: 2017-02-02

## 2017-02-02 RX ORDER — DEXTROSE MONOHYDRATE 50 MG/ML
INJECTION, SOLUTION INTRAVENOUS CONTINUOUS
Status: DISCONTINUED | OUTPATIENT
Start: 2017-02-02 | End: 2017-02-03

## 2017-02-02 RX ORDER — CHLORHEXIDINE GLUCONATE ORAL RINSE 1.2 MG/ML
15 SOLUTION DENTAL 2 TIMES DAILY
Status: DISCONTINUED | OUTPATIENT
Start: 2017-02-02 | End: 2017-02-04

## 2017-02-02 RX ORDER — FAMOTIDINE 20 MG/1
20 TABLET, FILM COATED ORAL EVERY 12 HOURS
Status: DISCONTINUED | OUTPATIENT
Start: 2017-02-02 | End: 2017-02-04

## 2017-02-02 RX ORDER — LORAZEPAM 2 MG/ML
1 INJECTION INTRAMUSCULAR
Status: DISCONTINUED | OUTPATIENT
Start: 2017-02-02 | End: 2017-02-08

## 2017-02-02 RX ORDER — LORAZEPAM 2 MG/ML
2 INJECTION INTRAMUSCULAR
Status: DISCONTINUED | OUTPATIENT
Start: 2017-02-02 | End: 2017-02-08

## 2017-02-02 RX ORDER — POTASSIUM CHLORIDE 7.45 MG/ML
10 INJECTION INTRAVENOUS ONCE
Status: COMPLETED | OUTPATIENT
Start: 2017-02-02 | End: 2017-02-02

## 2017-02-02 RX ORDER — LACTULOSE 20 G/30ML
30 SOLUTION ORAL
Status: DISCONTINUED | OUTPATIENT
Start: 2017-02-02 | End: 2017-02-02

## 2017-02-02 RX ORDER — LORAZEPAM 2 MG/ML
3 INJECTION INTRAMUSCULAR
Status: DISCONTINUED | OUTPATIENT
Start: 2017-02-02 | End: 2017-02-08

## 2017-02-02 RX ADMIN — FAMOTIDINE 20 MG: 10 INJECTION INTRAVENOUS at 08:00

## 2017-02-02 RX ADMIN — POTASSIUM CHLORIDE 20 MEQ: 200 INJECTION, SOLUTION INTRAVENOUS at 08:09

## 2017-02-02 RX ADMIN — SODIUM CHLORIDE: 4.5 INJECTION, SOLUTION INTRAVENOUS at 12:20

## 2017-02-02 RX ADMIN — FENTANYL CITRATE 50 MCG: 50 INJECTION, SOLUTION INTRAMUSCULAR; INTRAVENOUS at 22:30

## 2017-02-02 RX ADMIN — POTASSIUM CHLORIDE 20 MEQ: 200 INJECTION, SOLUTION INTRAVENOUS at 13:28

## 2017-02-02 RX ADMIN — FENTANYL CITRATE 50 MCG: 50 INJECTION, SOLUTION INTRAMUSCULAR; INTRAVENOUS at 17:44

## 2017-02-02 RX ADMIN — DEXTROSE MONOHYDRATE: 50 INJECTION, SOLUTION INTRAVENOUS at 20:00

## 2017-02-02 RX ADMIN — ROCURONIUM BROMIDE 50 MG: 10 INJECTION, SOLUTION INTRAVENOUS at 00:00

## 2017-02-02 RX ADMIN — FAMOTIDINE 20 MG: 20 TABLET, FILM COATED ORAL at 21:06

## 2017-02-02 RX ADMIN — ENOXAPARIN SODIUM 30 MG: 100 INJECTION SUBCUTANEOUS at 08:00

## 2017-02-02 RX ADMIN — PROPOFOL 50 MCG/KG/MIN: 10 INJECTION, EMULSION INTRAVENOUS at 23:00

## 2017-02-02 RX ADMIN — PROPOFOL 10 MCG/KG/MIN: 10 INJECTION, EMULSION INTRAVENOUS at 01:22

## 2017-02-02 RX ADMIN — CALCIUM GLUCONATE 1 G: 94 INJECTION, SOLUTION INTRAVENOUS at 02:10

## 2017-02-02 RX ADMIN — STANDARDIZED SENNA CONCENTRATE AND DOCUSATE SODIUM 1 TABLET: 8.6; 5 TABLET, FILM COATED ORAL at 21:06

## 2017-02-02 RX ADMIN — DESMOPRESSIN ACETATE 2 MCG: 4 INJECTION INTRAVENOUS at 16:04

## 2017-02-02 RX ADMIN — CHLORHEXIDINE GLUCONATE 15 ML: 1.2 RINSE ORAL at 08:00

## 2017-02-02 RX ADMIN — FENTANYL CITRATE 100 MCG: 50 INJECTION, SOLUTION INTRAMUSCULAR; INTRAVENOUS at 01:00

## 2017-02-02 RX ADMIN — PROPOFOL 40 MCG/KG/MIN: 10 INJECTION, EMULSION INTRAVENOUS at 11:30

## 2017-02-02 RX ADMIN — POTASSIUM CHLORIDE 10 MEQ: 7.46 INJECTION, SOLUTION INTRAVENOUS at 02:10

## 2017-02-02 RX ADMIN — POTASSIUM CHLORIDE 10 MEQ: 10 INJECTION, SOLUTION INTRAVENOUS at 08:09

## 2017-02-02 RX ADMIN — POTASSIUM CHLORIDE 10 MEQ: 7.46 INJECTION, SOLUTION INTRAVENOUS at 01:15

## 2017-02-02 RX ADMIN — LORAZEPAM 1 MG: 2 INJECTION INTRAMUSCULAR; INTRAVENOUS at 03:15

## 2017-02-02 RX ADMIN — PROPOFOL 50 MG: 10 INJECTION, EMULSION INTRAVENOUS at 00:00

## 2017-02-02 RX ADMIN — POTASSIUM CHLORIDE 10 MEQ: 10 INJECTION, SOLUTION INTRAVENOUS at 13:26

## 2017-02-02 RX ADMIN — POTASSIUM CHLORIDE 10 MEQ: 7.46 INJECTION, SOLUTION INTRAVENOUS at 00:20

## 2017-02-02 RX ADMIN — DEXTROSE MONOHYDRATE: 50 INJECTION, SOLUTION INTRAVENOUS at 16:05

## 2017-02-02 RX ADMIN — CHLORHEXIDINE GLUCONATE 15 ML: 1.2 RINSE ORAL at 21:06

## 2017-02-02 RX ADMIN — SODIUM CHLORIDE: 9 INJECTION, SOLUTION INTRAVENOUS at 01:28

## 2017-02-02 RX ADMIN — POTASSIUM CHLORIDE 20 MEQ: 14.9 INJECTION, SOLUTION INTRAVENOUS at 20:15

## 2017-02-02 NOTE — ED NOTES
Patient returned from CT. Patient actively seizing. MD notified 2mg of Ativan administered per MD.

## 2017-02-02 NOTE — PROGRESS NOTES
[facility] ICU Progress Note    ICU Attending/Resident: Dr Davila/Ephraim    Admit Date: 2/1/2017    ICU day: 1    Patient ID:     Name:             Fabiola Chacon   Date of birth:   1955  Age:                 61   MRN:               [id number]    HPI:  61 year old White female with PMH of chronic back pain, scoliosis, mood disorder admitted to ICU for seizure secondary to hyponatremia after neighbors sent her to the ED with altered mental status.    Diagnosis:  Hyponatremia  Seizure      Interval Events:  Admitted to ICU overnight with Na+ 108  Three witnessed seizures, last at 3am  Received hypertonic saline in ED, Na+ to 117  Reduced to NS 50mL hr, Na+ to 115  No further seizure  Medical history unclear, emergency contact is her cell phone    =====================================================  Objective - Systems Based   =====================================================  Fluids:  Intake/Output Summary (Last 24 hours) at 02/02/17 0749  Last data filed at 02/02/17 0600   Gross per 24 hour   Intake 1063.66 ml   Output    475 ml   Net 588.66 ml       Weight: 44.9 kg (98 lb 15.8 oz)  Body mass index is 15.98 kg/(m^2).    Recent Labs      02/01/17   2220  02/02/17   0210  02/02/17   0615   SODIUM  117*  115*  115*   POTASSIUM  3.0*  4.0  3.4*   CHLORIDE  90*  86*  87*   CO2  19*  20  23   BUN  6*  7*  7*   CREATININE  0.21*  0.27*  0.21*   CALCIUM  6.7*  7.9*  8.2*     =====================================================    Review of Systems   Unable to perform ROS: intubated       GEN:  Intubated and sedated, responds to commands    HEENT:  Normocephalic/atraumatic, PERRL    EXT:  Perfusing and moving all extremities. Distal pulses intact    NEURO:  No obvious focal deficits, reflexes intact. Seizure x3, last at 3am    Respiratory/Pulmonary:  Jameson Vent Mode: APVCMV  Respiration: 18, Pulse Oximetry: 99 %, O2 Daily Delivery Respiratory : Silicone Nasal Cannula      HemoDynamics/Cardiovascular:  Pulse: (!) 105,  Heart Rate (Monitored): 100 Blood Pressure: 110/84 mmHg, NIBP: (!) 92/67 mmHg CVP (mm Hg): 2 MM HG      GI/Nutrition:  Cortrack placed, tube feeding started    Recent Labs      17   ALTSGPT  29   --    --    --    ASTSGOT  76*   --    --    --    ALKPHOSPHAT  71   --    --    --    TBILIRUBIN  1.2   --    --    --    GLUCOSE  120*  91  133*  117*       Heme/Onc:  Recent Labs      17   RBC  3.20*  3.77*   HEMOGLOBIN  10.5*  12.9   HEMATOCRIT  29.0*  34.9*   PLATELETCT  172  183       Infectious Disease:  Temp  Av.2 °C (99 °F)  Min: 36.5 °C (97.7 °F)  Max: 37.6 °C (99.6 °F)  Recent Labs      17   WBC  9.7  12.4*   NEUTSPOLYS  82.00*  83.60*   LYMPHOCYTES  7.00*  6.00*   MONOCYTES  10.30  9.70   EOSINOPHILS  0.00  0.00   BASOPHILS  0.10  0.20   ASTSGOT  76*   --    ALTSGPT  29   --    ALKPHOSPHAT  71   --    TBILIRUBIN  1.2   --      • Respiratory Care per Protocol       • chlorhexidine  15 mL     • lidocaine  1-2 mL     • NS   50 mL/hr at 17 0739   • fentaNYL  25 mcg      Or   • fentaNYL  50 mcg      Or   • fentaNYL  100 mcg     • famotidine  20 mg      Or   • famotidine  20 mg     • K+ Scale: Goal of 5  1 Each     • propofol  5-80 mcg/kg/min 30 mcg/kg/min (17 0740)   • fentaNYL  100 mcg     • potassium chloride (KCL-CENTRAL) IV *Administer in ICU only*  20 mEq      And   • potassium chloride (KCL-CENTRAL) IV *Administer in ICU only*  10 mEq     • lorazepam  0.5 mg     • docusate sodium  100 mg      And   • senna-docusate  1 Tab      And   • senna-docusate  1 Tab      And   • lactulose  30 mL      And   • bisacodyl  10 mg      And   • fleet  1 Each     • enoxaparin  30 mg     • lorazepam  1 mg          =====================================================  Procedures and Imaging:  DX-CHEST-PORTABLE (1 VIEW)   Final Result      1.  Patchy LEFT lung base infiltrate or atelectasis  with probable small LEFT pleural effusion.   2.  Supportive tubing as described above.   3.  No pneumothorax.      CT-HEAD W/O   Final Result      1.  Motion artifact limits exam.   2.  No acute intracranial abnormality.          =====================================================  Problem and Plan:  1. Altered mentation  * Tonic-clonic seizures  -secondary to serum Na+108.   -baseline Na from 2013 was 132  -CT head neg.  -received 3%NaCl. 250 cc  -Na improved to 117  -NS 50mL/hr, sodium to 115  -Utox +for opiates and MJ  -Seizure/fall/aspiraton risk precautions.  -Urine/serum osmolality, urine lytes, TSH & Utox have been ordered.  -Q4hrs neuro checks.  -cortrack for feeding    2. Hyponatremia  -etiology is unknown, but appears hypovolemic.  -Requested outside records.  -urine osm/lytes pending  -Na levels with intervention 108-->117-->115    3. Normocytic anemia  -FOBT pending  -no obvious source of bleeding  -will draw Fe panel, B12, folate, retic    4. Elevated AST  -possibly alcoholic  -will trend    5. Chronic neck and back pain  -judicious pain control in setting of AMS    6. Insomnia  -home med is klonipin  -prn ativan for seizures for now    Quality Measures:  Alcantar Catheter: Critically ill  DVT Prophylaxis: Lovenox  Ulcer Prophylaxis: Omeprazole  Antibiotics: Not indicated  Lines: RIJ  ======================================================

## 2017-02-02 NOTE — PROGRESS NOTES
Ann Marien from Lab called with critical result of Na+ 115 at 0300. Critical lab result read back to [place].   This critical lab result is within parameters established by  for this patient

## 2017-02-02 NOTE — RESPIRATORY CARE
Cardiopulmonary Resuscitation/Intubation Assist    Intubation assist performed Yes  Reason for intubation : Airway protection   Positive Color Change on EZCap? Yes  Difficult Intubation/Number of attempts 2  Evidence of aspiration None  Airway Group ET Tube Oral 7.0-Secured At  (cm): 24 (02/02/17 0014)  Jameson Vent Mode: APVCMV (02/02/17 0012)     Rate (breaths/min): 18 (02/02/17 0012)  Vt Target (mL): 290 (02/02/17 0012)  FiO2: 100 (02/02/17 0012)  PEEP/CPAP: 8 (02/02/17 0012)       Events/Summary/Plan: Pt intubated (02/02/17 0012)

## 2017-02-02 NOTE — H&P
Internal Medicine  Admitting History and Physical    Name Fabiola Chacon 1955   Age/Sex 61 y.o. female   MRN [id number]   Code Status Full     After 5PM or if no immediate response to page, please call for cross-coverage  Attending/Team: MABLE Raymond Call [phone] to page    2nd Call - Day Sr. Resident (R2/R3): Dr Garcia                 Chief Complaint:  Altered mentation.    HPI:  Ms Chacon is a 61 year old female with pmhx significant for insomnia, scoliosis, back and neck pain was brought to ER by MATTEO after neighbors called for AMS. She was found to be unkempt, and per neighbors has hx of tumor and chronic narcotic use for pain. No further hx is available at this time. When I saw patient in the ER, she had just returned from CT and was actively seizing with tonic-clonic seizures. She was also reportedly seizing while in CT. 2mg of ativan was given in ER with which her seizures stopped. Patient is awake, protecting her airway but is unresponsive. Per chart review, her last admission to [facility] was in  for AMS at which time her symptoms resolved with IV fluids. Per med review she is on chronic narcotics and temazepam.      Review of Systems   Unable to perform ROS: mental status change           Past Medical History:   Past Medical History   Diagnosis Date   • Insomnia    • Scoliosis    • Fall    • Backpain      scoliosis and neck pain       Past Surgical History:  Past Surgical History   Procedure Laterality Date   • Tubal coagulation laparoscopic bilateral         Current Outpatient Medications:  Home Medications     Reviewed by Cris Ireland R.N. (Registered Nurse) on 17 at 1901  Med List Status: Partial    Medication Last Dose Status    acetaminophen 650 MG TABS  Active    hydrocodone/acetaminophen (NORCO)  MG Tab  Active    temazepam (RESTORIL) 30 MG capsule  Active                Medication Allergy/Sensitivities:  Allergies   Allergen Reactions   • Codeine    • Ultram  "[Tramadol Hcl]      seizure   • Percocet [Oxycodone-Acetaminophen]        Family History:  Could not be reviewed as patient is altered.    Social History:  Could not be reviewed as patient is altered.       Physical Exam   Filed Vitals:    02/01/17 1834 02/01/17 1835 02/01/17 1855 02/01/17 1932   BP: 110/84      Pulse: 110 93  86   Temp:   36.5 °C (97.7 °F)    Resp:  17  21   Height:       Weight:       SpO2:  96%  97%     Body mass index is 13.4 kg/(m^2).  /84 mmHg  Pulse 86  Temp(Src) 36.5 °C (97.7 °F)  Resp 21  Ht 1.676 m (5' 6\")  Wt 37.649 kg (83 lb)  BMI 13.40 kg/m2  SpO2 97%  O2 therapy: Pulse Oximetry: 97 %    Physical Exam   Constitutional:   Awake with eyes open but unresponsive.    HENT:   Head: Normocephalic and atraumatic.   Eyes:   B/l dilated pupils minimally responsive to light.   Neck: Normal range of motion. Neck supple.   Cardiovascular: Normal rate, regular rhythm and normal heart sounds.    Pulmonary/Chest: Effort normal and breath sounds normal.   Abdominal: Soft. Bowel sounds are normal. She exhibits distension. There is no tenderness.   Neurological:   Awake with eyes open but unresponsive to commands or pain.    Skin: Skin is warm and dry.          Data Review       Old Records Request:  Yes  Current Records review and summary: Yes    Lab Data Review:  Chem panel: Na 108, K 3.7, Cl 78, Co2 20, glucose 120, BUN 7,    Imaging/Procedures Review:    Ct head neg for acute changes.       EKG:   EKG Independant Review:  QTc:506, HR: 100, Normal Sinus Rhythm, non specific ST/T changes in lateral leads.    (Yes) Records reviewed and summarized in current documentation             Assessment/Plan        1. Altered mentation  * Tonic-clonic seizures  - Likely secondary to a serum sodium of 108. Baseline Na or mentation unknown.  - CT head neg.  - Started patient on 3%NaCl. 250 cc bolus followed by repeat BMP. Will repeat BMP Q4 hrs.  - Seizure/fall/aspiraton risk precautions.  - Urine/serum " osmolality, urine lytes, TSH & Utox have been ordered.  - Q4hrs neuro checks.  - NPO    2. Hyponatremia  - Likely hypovolemic.  - Hx of ? Tumor. ? SIADH. Requested outside records.  - Plan as above.    3. Bilateral dilated pupils  - Likely secondary to seizures vs paraneoplastic syndrome.  - CT head neg.  - Neuro checks as above.    4. Normocytic anemia  - FOBT ordered.    5. Elevated AST  - Repeat labs tomorrow. If trending up, she will need further investigation.    6. Chronic neck and back pain  - Given her altered mentation will hold narcotics, will add prn morphine.    7. Insomnia  - Ch benzo use.  - Prn ativan as above. Hold home medications for now.    Anticipated Hospital stay: >2 midnight stay.      Quality Measures  EKG reviewed  Alcantar catheter: No Alcantar      DVT Prophylaxis: Enoxaparin (Lovenox)    Ulcer prophylaxis: Yes

## 2017-02-02 NOTE — ED NOTES
"BIBA from home. Per EMS, neighbors called for AMS. Unknown what pt's baseline orientation is. Unknown when she was last peng. Per EMS, pt is hoarder, and house was filthy. Pt with history of \"tumor and chronic pain\" per neighbor. Pt arrives in ED alert and oriented x 0. Able to follow commands. Smile symmetrical. Pt speaking nonsensical words.  "

## 2017-02-02 NOTE — CARE PLAN
Problem: Ventilation Defect:  Goal: Ability to achieve and maintain unassisted ventilation or tolerate decreased levels of ventilator support  Outcome: PROGRESSING AS EXPECTED  Intervention: Support and monitor invasive and noninvasive mechanical ventilation  Adult Ventilation Update    Total Vent Days: 1      Patient Lines/Drains/Airways Status    Active Airway      Name: Placement date: Placement time: Site: Days:     Airway Group ET Tube Oral 7.0 02/02/17  0005  Oral  less than 1                  Plateau Pressure (Q Shift): 14 (02/02/17 0231)  Static Compliance (ml / cm H2O): 121 (02/02/17 0231)    Pt was in ICU and Intubated for airway protection, Fio2 titrated down to 50%. AM ABG as follows:  Results for BLAKERADHA CELAYA (MRN [id number]) as of 2/2/2017 05:25    2/2/2017 05:00   Ph 7.444   Pco2 31.1   Po2 282 (H)   Hco3 21.3   BE -2   Tco2 22   S02 100 (H)   Ph Temp Isis 7.443   Pco2 Temp Co 31.2   Po2 Temp Cor 282 (H)

## 2017-02-02 NOTE — CARE PLAN
Problem: Respiratory:  Goal: Respiratory status will improve  Pt intubated for airway protection post ictally    Problem: Skin Integrity  Goal: Risk for impaired skin integrity will decrease  Intervention: Assess risk factors for impaired skin integrity and/or pressure ulcers  Problem: Skin Integrity  Goal: Skin Integrity is maintained or improved  Intervention: Philippe Skin Risk Assessment  Philippe assessed q shift-13, repositioned q 2hrs  Intervention: TURN EVERY 2 HOURS WHILE ON BEDREST  Pillows for positioning, mattress pressure 22, SCDs in place

## 2017-02-02 NOTE — PROCEDURES
Indication: Airway compromise from seizures.  Resident: Dr Morrissey  Attending: Dr Garcia    A time-out was completed verifying correct patient, procedure, site, and positioning. The patient was placed in a flat position. Sedation was obtained using propofol 50mg, and additionally with rocuronium 50mg. The patient was easily ventilated using an ambu bag. The GLIDESCOPE TECHNOLOGY was used and inserted into the oropharynx at which time there was a Grade 1 view of the vocal cords. However, 7.5-Mauritian ET couldn't be passed so MAC 3 BLADE was used and a 7.0-Mauritian endotracheal tube was inserted and visualized going through the vocal cords. The stylette was removed. Colorimetric change was visualized on the CO2 meter. Breath sounds were heard in both lung fields equally. The endotracheal tube was placed at 23 cm, measured at the teeth. Attending was present for the entire procedure.    A chest x-ray was ordered and is pending at the time of this note.    Estimated Blood Loss: None

## 2017-02-02 NOTE — ED NOTES
Pt assessed with MD.  Pt continues to have garbled speech.  Follows some commands but not consistant.  Noted that she does move all extremities at times.

## 2017-02-02 NOTE — PROCEDURES
Indication: Hemodynamic monitoring/Intravenous access  Resident: Dr Morrissey  Attending: Dr Garcia    A time-out was completed verifying correct patient, procedure, site, positioning, and US machine. The patient was placed in a dependent position appropriate for central line placement based on the vein to be cannulated. The patient’s right neck was prepped and draped in sterile fashion. 1% Lidocaine was used to anesthetize the surrounding skin area. A triple lumen Yemeni Cordis catheter was introduced into the the internal jugular using the Seldinger technique and under ultrasound guidance. Guidewire initially didn't pass, so the needle was repositioned and a different guide wire was used successfully. The catheter was threaded smoothly over the guide wire and appropriate blood return was obtained. Each lumen of the catheter was evacuated of air and flushed with sterile saline. The catheter was then sutured in place to the skin and a sterile dressing applied. Perfusion to the extremity distal to the point of catheter insertion was checked and found to be adequate. Attending was present for the entire procedure.    Estimated Blood Loss: 10 ml.    CXR pending at this time.

## 2017-02-02 NOTE — ED PROVIDER NOTES
"ED Provider Note    ED Provider Note      P  CHIEF COMPLAINT  Chief Complaint   Patient presents with   • ALOC       HPI  Fabiola Chacon is a 61 y.o. female who presents to the Emergency Department brought in by EMS for AMS. Per EMS the neighbors had called and when the arrived she was mumbling nonsensical words, alert but not following commands. In addition her house was \"filthy\" and concern for hoarding. Unknown when last seen normal.       REVIEW OF SYSTEMS  Review of Systems   Unable to perform ROS: Mental status change       PAST MEDICAL HISTORY   has a past medical history of Insomnia; Scoliosis; Fall; and Backpain.    SURGICAL HISTORY   has past surgical history that includes tubal coagulation laparoscopic bilateral.    SOCIAL HISTORY  Social History   Substance Use Topics   • Smoking status: Former Smoker   • Smokeless tobacco: None   • Alcohol Use: No      History   Drug Use No       FAMILY HISTORY  History reviewed. No pertinent family history.    CURRENT MEDICATIONS  Reviewed.  See Encounter Summary.     ALLERGIES  Allergies   Allergen Reactions   • Codeine    • Ultram [Tramadol Hcl]      seizure   • Percocet [Oxycodone-Acetaminophen]        PHYSICAL EXAM  VITAL SIGNS: /84 mmHg  Pulse 99  Temp(Src) 37.3 °C (99.1 °F)  Resp 14  Ht 1.676 m (5' 6\")  Wt 37.649 kg (83 lb)  BMI 13.40 kg/m2  SpO2 92%  Physical Exam   Constitutional: No distress.   HENT:   Head: Normocephalic and atraumatic.   Eyes: Pupils are equal, round, and reactive to light.   Neck: Normal range of motion.   Cardiovascular: Normal rate.    Pulmonary/Chest: Effort normal.   Abdominal: Soft.   Musculoskeletal: She exhibits no edema.   Neurological: She is alert. GCS eye subscore is 4. GCS verbal subscore is 4. GCS motor subscore is 5.   Alert only to herself but not to place or date. Her face is symmetric. She moves all extremities spontaneously however some require painful stimuli   Skin: She is not diaphoretic.   No signs of skin " breakdown or ulcer on her sacrum           DIAGNOSTIC STUDIES / PROCEDURES     LABS  Results for orders placed or performed during the hospital encounter of 02/01/17   CBC WITH DIFFERENTIAL   Result Value Ref Range    WBC 9.7 4.8 - 10.8 K/uL    RBC 3.20 (L) 4.20 - 5.40 M/uL    Hemoglobin 10.5 (L) 12.0 - 16.0 g/dL    Hematocrit 29.0 (L) 37.0 - 47.0 %    MCV 90.6 81.4 - 97.8 fL    MCH 32.8 27.0 - 33.0 pg    MCHC 36.2 (H) 33.6 - 35.0 g/dL    RDW 38.5 35.9 - 50.0 fL    Platelet Count 172 164 - 446 K/uL    MPV 10.2 9.0 - 12.9 fL    Neutrophils-Polys 82.00 (H) 44.00 - 72.00 %    Lymphocytes 7.00 (L) 22.00 - 41.00 %    Monocytes 10.30 0.00 - 13.40 %    Eosinophils 0.00 0.00 - 6.90 %    Basophils 0.10 0.00 - 1.80 %    Immature Granulocytes 0.60 0.00 - 0.90 %    Nucleated RBC 0.00 /100 WBC    Neutrophils (Absolute) 8.08 (H) 2.00 - 7.15 K/uL    Lymphs (Absolute) 0.69 (L) 1.00 - 4.80 K/uL    Monos (Absolute) 1.02 (H) 0.00 - 0.85 K/uL    Eos (Absolute) 0.00 0.00 - 0.51 K/uL    Baso (Absolute) 0.01 0.00 - 0.12 K/uL    Immature Granulocytes (abs) 0.06 0.00 - 0.11 K/uL    NRBC (Absolute) 0.00 K/uL   COMP METABOLIC PANEL   Result Value Ref Range    Sodium 108 (LL) 135 - 145 mmol/L    Potassium 3.7 3.6 - 5.5 mmol/L    Chloride 78 (L) 96 - 112 mmol/L    Co2 20 20 - 33 mmol/L    Anion Gap 10.0 0.0 - 11.9    Glucose 120 (H) 65 - 99 mg/dL    Bun 7 (L) 8 - 22 mg/dL    Creatinine 0.22 (L) 0.50 - 1.40 mg/dL    Calcium 7.9 (L) 8.5 - 10.5 mg/dL    AST(SGOT) 76 (H) 12 - 45 U/L    ALT(SGPT) 29 2 - 50 U/L    Alkaline Phosphatase 71 30 - 99 U/L    Total Bilirubin 1.2 0.1 - 1.5 mg/dL    Albumin 3.6 3.2 - 4.9 g/dL    Total Protein 6.5 6.0 - 8.2 g/dL    Globulin 2.9 1.9 - 3.5 g/dL    A-G Ratio 1.2 g/dL   URINALYSIS CULTURE, IF INDICATED   Result Value Ref Range    Micro Urine Req Microscopic     Color Yellow     Character Clear     Specific Gravity 1.017 <1.035    Ph 6.0 5.0-8.0    Glucose Negative Negative mg/dL    Ketones 60 (A) Negative mg/dL     Protein 70 (A) Negative mg/dL    Bilirubin Negative Negative    Nitrite Negative Negative    Leukocyte Esterase Negative Negative    Occult Blood Moderate (A) Negative    Culture Indicated No UA Culture   URINE MICROSCOPIC (W/UA)   Result Value Ref Range    WBC 0-2 /hpf    RBC 2-5 (A) /hpf    Bacteria Few (A) None /hpf    Hyaline Cast 0-2 /lpf    Granular Casts 3-5 (A) /lpf   ESTIMATED GFR   Result Value Ref Range    GFR If African American >60 >60 mL/min/1.73 m 2    GFR If Non African American >60 >60 mL/min/1.73 m 2   TSH   Result Value Ref Range    TSH 0.950 0.300 - 3.700 uIU/mL   URINE SODIUM RANDOM   Result Value Ref Range    Sodium, Urine -per volume 37 mmol/L   URINE POTASSIUM RANDOM   Result Value Ref Range    Potassium 64.1 mmol/L   URINE CHLORIDE RANDOM   Result Value Ref Range    Chloride, Urine-per volume 47 mmol/L   URINE CREATININE RANDOM   Result Value Ref Range    Creatinine, Random Urine 44.60 mg/dL   URINE PROTEIN RANDOM   Result Value Ref Range    Total Protein, Urine 113.8 (H) 0.0 - 15.0 mg/dL   OSMOLALITY URINE   Result Value Ref Range    Osmolality Urine 501 300 - 900 mOsm/kg H2O   URINE DRUG SCREEN   Result Value Ref Range    Amphetamines Urine Negative Negative    Barbiturates Negative Negative    Benzodiazepines Negative Negative    Cocaine Metabolite Negative Negative    Methadone Negative Negative    Ecstasy Negative Negative    Opiates Positive (A) Negative    Oxycodone Negative Negative    Phencyclidine -Pcp Negative Negative    Propoxyphene Negative Negative    Cannabinoid Metab Positive (A) Negative   OSMOLALITY SERUM   Result Value Ref Range    Osmolality Serum 227 (L) 278 - 298 mOsm/kg H2O   Basic Metabolic Panel (BMP)   Result Value Ref Range    Sodium 117 (LL) 135 - 145 mmol/L    Potassium 3.0 (L) 3.6 - 5.5 mmol/L    Chloride 90 (L) 96 - 112 mmol/L    Co2 19 (L) 20 - 33 mmol/L    Glucose 91 65 - 99 mg/dL    Bun 6 (L) 8 - 22 mg/dL    Creatinine 0.21 (L) 0.50 - 1.40 mg/dL    Calcium  6.7 (LL) 8.5 - 10.5 mg/dL    Anion Gap 8.0 0.0 - 11.9   ESTIMATED GFR   Result Value Ref Range    GFR If African American >60 >60 mL/min/1.73 m 2    GFR If Non African American >60 >60 mL/min/1.73 m 2   ALBUMIN   Result Value Ref Range    Albumin 2.7 (L) 3.2 - 4.9 g/dL   ISTAT LACTATE   Result Value Ref Range    iStat Lactate 1.1 0.5 - 2.0 mmol/L   EKG (NOW)   Result Value Ref Range    Report       [facility] Emergency Dept.    Test Date:  2017  Pt Name:    RADHA CANELA                Department: ER  MRN:        [id number]                      Room:       [facility]  Gender:     F                            Technician: [id number]  :        1955                   Requested By:CY HARDY  Order #:    [id number]                    Reading MD:    Measurements  Intervals                                Axis  Rate:       96                           P:          84  ID:         [id number]                          QRS:        -5  QRSD:       98                           T:          26  QT:         400  QTc:        506    Interpretive Statements  SINUS RHYTHM  LATERAL INJURY, PROBABLE EARLY ACUTE INFARCT  BORDERLINE PROLONGED QT INTERVAL  Compared to ECG 2013 21:54:24  Myocardial infarct finding now present     ISTAT ARTERIAL BLOOD GAS   Result Value Ref Range    Ph 7.319 (L) 7.400 - 7.500    Pco2 37.7 (H) 26.0 - 37.0 mmHg    Po2 56 (L) 64 - 87 mmHg    Tco2 21 20 - 33 mmol/L    S02 86 (L) 93 - 99 %    Hco3 19.4 17.0 - 25.0 mmol/L    BE -6 (L) -4 - 3 mmol/L    Body Temp 99.8 F degrees    O2 Therapy 65 %    Ph Temp Isis 7.309 (L) 7.400 - 7.500    Pco2 Temp Co 38.9 (H) 26.0 - 37.0 mmHg    Po2 Temp Cor 58 (L) 64 - 87 mmHg    Specimen Arterial        All labs were reviewed by me.    EKG Time completed  750pm  12 Lead EKG interpreted by me to show:  Sinus rhythm   Rate 96  Axis: Normal  Intervals:       [id number]  Isolated ST elevation of approximately 1 mm only in lead V5 however EKG has very  poor baseline      RADIOLOGY  DX-CHEST-PORTABLE (1 VIEW)   Final Result      1.  Patchy LEFT lung base infiltrate or atelectasis with probable small LEFT pleural effusion.   2.  Supportive tubing as described above.   3.  No pneumothorax.      CT-HEAD W/O   Final Result      1.  Motion artifact limits exam.   2.  No acute intracranial abnormality.        The radiologist's interpretation of all radiological studies have been reviewed by me.    COURSE & MEDICAL DECISION MAKING  Pertinent Labs & Imaging studies reviewed. (See chart for details)    7:35 PM - Patient seen and examined at bedside.     Decision Making:  This is a 61 y.o. year old female who presents with concern of altered mental status after being found in her home. She initially presented not very responsive however after we woke her up further she was speaking more direct words and more clear speech however would doze off and have more garbled speech and she did start to localize to pain and moved all extremities.  Differential includes infection such as pneumonia or urinary tract infection, intracranial hemorrhage, electrolyte abnormality , overdose, polypharmacy, stroke, TIA  CT head showed no intracranial hemorrhage. She seemed to move all extremities at this time there is less concern for acute stroke. Her EKG showed nonspecific T-wave changes but no signs of a STEMI. Her labs however did show significant hyponatremia with sodium of 108. This would explain a lot of her symptoms at this time. She was given 1 L IV fluid normal saline when she 1st presented here for concerns of infection or dehydration  She was admitted to [facility] for further management and treatment for her hyponatremia       FINAL IMPRESSION  1. Altered mental status, unspecified altered mental status type    2. Hyponatremia

## 2017-02-02 NOTE — PROGRESS NOTES
"Pulmonary Critical Care Progress Note        Chief Complaint: Siezure    History of Present Illness:  60 y/o F, h/o chronic neck/back pain on narcotics and restoril for insomnia & ? tumor; found altered by neighbors in her house; EMS called and found patient altered, house filthy and \"pt is a hoarder.\" Witnessed seizure x 2 in ED.  Sodium 108 initially.    ROS:  Respiratory: unable to perform due to the patient's inability to effectively communicate, Cardiac: unable to perform due to the patient's inability to effectively communicate, GI: unable to perform due to the patient's inability to effectively communicate.      Interval Events:  24 hour interval history reviewed      LOC - follows - Sz x 3 since admit - last 3 AM on arrival to ICU   CXR SSLLLA - ET ok   Hemodynamics noted - no pressors   Prop 30   No SBT - acidosis better   Labile Na today - too rapid correction?   Urine studies noted   TSH normal   CM/SW help to get MedRec    PFSH:  No change.    Respiratory:  Jameson Vent Mode: APVCMV, Rate (breaths/min): 18, Vt Target (mL): 290, PEEP/CPAP: 8, FiO2: 65, Static Compliance (ml / cm H2O): 45, Control VTE (exp VT): 354  Pulse Oximetry: 100 %            Exam: unlabored respirations, no intercostal retractions or accessory muscle use    Diminished BS at bases  ImagingAvailable data reviewed    CXRs reviewed with Team    Recent Labs      02/02/17   0115  02/02/17   0500   ISTATAPH  7.319*  7.444   ISTATAPCO2  37.7*  31.1   ISTATAPO2  56*  282*   ISTATATCO2  21  22   [id number]  86*  100*   ISTATARTHCO3  19.4  21.3   ISTATARTBE  -6*  -2   ISTATTEMP  99.8 F  98.7 F   ISTATFIO2  65  70   ISTATSPEC  Arterial  Arterial   ISTATAPHTC  7.309*  7.443   [id number]  58*  282*       HemoDynamics:  Pulse: (!) 134, Heart Rate (Monitored): (!) 105  Blood Pressure: 110/84 mmHg, NIBP: 141/87 mmHg       Exam: sinus tachycardia, no edema - good perfusion  Imaging: Available data reviewed        Neuro:  GCS Total Marion Coma " Score: 7     Propofol  Exam: no focal deficits noted Heavily sedated  Imaging: Available data reviewed    Fluids:  Intake/Output       17 07 - 17 0659 (Not Admitted) 17 - 17 0659 17 - 17 0659      [id number] 9188-8236 Total 4721-1231 9691-6586 Total 3982-2242 6575-3256 Total       Intake    I.V.  --  -- --  --  706.5 706.5  --  -- --    Propofol Volume -- -- -- -- 6.5 6.5 -- -- --    IV Volume (3% Bolus) -- -- -- -- 250 250 -- -- --    IV Volume (NS) -- -- -- -- 50 50 -- -- --    IV Piggyback Volume -- -- -- -- 400 400 -- -- --    Total Intake -- -- -- -- 706.5 706.5 -- -- --       Output    Urine  --  -- --  --  250 250  --  -- --    Indwelling Cathether -- -- -- -- 250 250 -- -- --    Total Output -- -- -- -- 250 250 -- -- --       Net I/O     -- -- -- -- 456.5 456.5 -- -- --        Weight: 37.649 kg (83 lb)  Recent Labs      17   0210   SODIUM  108*  117*  115*   POTASSIUM  3.7  3.0*  4.0   CHLORIDE  78*  90*  86*   CO2  20  19*  20   BUN  7*  6*  7*   CREATININE  0.22*  0.21*  0.27*   CALCIUM  7.9*  6.7*  7.9*       GI/Nutrition:  Exam: abdomen is soft and non-tender, normal active bowel sounds  Imaging: Available data reviewed  NPO  Liver Function  Recent Labs      17   0210   ALTSGPT  29   --    --    ASTSGOT  76*   --    --    ALKPHOSPHAT  71   --    --    TBILIRUBIN  1.2   --    --    GLUCOSE  120*  91  133*       Heme:  Recent Labs      17   1833  17   RBC  3.20*  3.77*   HEMOGLOBIN  10.5*  12.9   HEMATOCRIT  29.0*  34.9*   PLATELETCT  172  183       Infectious Disease:  Temp  Av.1 °C (98.8 °F)  Min: 36.5 °C (97.7 °F)  Max: 37.4 °C (99.3 °F)  Micro: reviewed  Recent Labs      170   WBC  9.7  12.4*   NEUTSPOLYS  82.00*  83.60*   LYMPHOCYTES  7.00*  6.00*   MONOCYTES  10.30  9.70   EOSINOPHILS  0.00  0.00   BASOPHILS  0.10  0.20    ASTSGOT  76*   --    ALTSGPT  29   --    ALKPHOSPHAT  71   --    TBILIRUBIN  1.2   --      Current Facility-Administered Medications   Medication Dose Frequency Provider Last Rate Last Dose   • Respiratory Care per Protocol   Continuous RT Eddie Garcia M.D.       • chlorhexidine (PERIDEX) 0.12 % solution 15 mL  15 mL BID Eddie Garcia M.D.   15 mL at 02/02/17 0045   • lidocaine (XYLOCAINE) 1%  injection  1-2 mL Q30 MIN PRN Eddie Garcia M.D.       • NS infusion   Continuous Fili Morrissey M.D. 50 mL/hr at 02/02/17 0400     • fentaNYL (SUBLIMAZE) injection 25 mcg  25 mcg Q HOUR PRN Eddie Garcia M.D.        Or   • fentaNYL (SUBLIMAZE) injection 50 mcg  50 mcg Q HOUR PRN Eddie Garcia M.D.        Or   • fentaNYL (SUBLIMAZE) injection 100 mcg  100 mcg Q HOUR PRN Eddie Garcia M.D.       • famotidine (PEPCID) tablet 20 mg  20 mg Q12HRS Eddie Garcia M.D.        Or   • famotidine (PEPCID) injection 20 mg  20 mg Q12HRS Eddie Garcia M.D.       • K+ Scale: Goal of 5  1 Each Q6HRS Eddie Garcia M.D.   Stopped at 02/02/17 0045   • propofol (DIPRIVAN) injection  5-80 mcg/kg/min Continuous Inocencio De León PHARMD 6.8 mL/hr at 02/02/17 0200 30 mcg/kg/min at 02/02/17 0200   • fentaNYL (SUBLIMAZE) injection 100 mcg  100 mcg Once Eddie Garcia M.D.       • lorazepam (ATIVAN) injection 0.5 mg  0.5 mg Once Fili Morrissey M.D.   Stopped at 02/01/17 2115   • docusate sodium (COLACE) capsule 100 mg  100 mg QAM iFli Morrissey M.D.   Stopped at 02/01/17 2115    And   • senna-docusate (PERICOLACE or SENOKOT S) 8.6-50 MG per tablet 1 Tab  1 Tab Nightly Fili Morrissey M.D.   Stopped at 02/01/17 2115    And   • senna-docusate (PERICOLACE or SENOKOT S) 8.6-50 MG per tablet 1 Tab  1 Tab Q24HRS PRN Fili Morrissey M.D.        And   • lactulose 20 GM/30ML solution 30 mL  30 mL Q24HRS PRN Fili Morrissey M.D.        And   • bisacodyl (DULCOLAX) suppository 10 mg  10 mg Q24HRS PRN Fili  CATRACHITO Morrissey        And   • fleet enema 133 mL  1 Each Once PRN Fili Morrissey M.D.       • enoxaparin (LOVENOX) inj 30 mg  30 mg DAILY Fili Morrissey M.D.       • lorazepam (ATIVAN) injection 1 mg  1 mg Q HOUR PRN Fili Morrissey M.D.   1 mg at 02/02/17 0315     Last reviewed on 2/1/2017  7:01 PM by Cris Ireland R.N.    Quality  Measures:  Labs reviewed, Radiology images reviewed and Medications reviewed  Alcantar catheter: Critically Ill - Requiring Accurate Measurement of Urinary Output and Unconscious / Sedated Patient on a Ventilator      DVT Prophylaxis: Enoxaparin (Lovenox)  DVT prophylaxis - mechanical: SCDs  Ulcer prophylaxis: Yes    Assessed for rehab: Patient unable to tolerate rehabilitation therapeutic regimen      Problems/plan:  Acute respiratory failure   Intubated 2/2   RT protocols   Sedation vacations as tolerated   Mobilize  Hyponatremia - severe - initial Na 108 - euvolemic/hypovolemic? Etiology not clear yet   Initial 3% bolus in ER 2/2   Near too rapid correction   1/2 NS started   Serial Na levels - try to correct ~ 9 mEq/day   PRN free H2O/DDAVP as needed vs 3% saline boluses/drips   Correct other metabolic disturbances - K/Mg/Phos as needed  S/p tonic clonic siezure x 3  - none since arrival to ICU   PRN Ativan   Slow Na correction to prevent CPM   Propofol for sedation  Incomplete data base  Enteral nutrition - Cortrak  Proiphylaxis    Discussed patient condition and risk of morbidity and/or mortality with RN, RT, Pharmacy, [employer] [facility] resident and Charge nurse / hot rounds.    Date of service 2/2/2017  07039  The patient remains critically ill.  Critical care time = 35 minutes in directly providing and coordinating critical care and extensive data review.  No time overlap and excludes procedures.

## 2017-02-02 NOTE — DIETARY
Nutrition Support Assessment - Female    Fabiola Chacon is a 61 y.o. female with admitting DX of:  1)  Altered consciousness  2) hyponatremia  3) seizure    Pertinent History:  Scoliosis, backpain  Allergies: Codeine; Ultram; and Percocet    Height: 152.4 cm (5')  Weight: 44.9 kg (98 lb 15.8 oz)  Weight to Use in Calculations: 44.9 kg (98 lb 15.8 oz)  Ideal Body Weight: 45.36 kg (100 lb)  Percent Ideal Body Weight: 99  Body mass index is 19.33 kg/(m^2).    Pertinent Labs: Na 118, albumin 2.7  Last BM:  (PTA)  Pertinent Medications: colace, senokot, pepcid, K scale  Pertinent Fluids: 1/2 NS @ 50 ml/hr, propofol @ 6.8 ml/hr (180 kcals/day)  Surgery / Procedures: na  Skin:  No skin breakdown noted     Estimated Needs:MSJ x 1.2  Total Calories / day: 1100 - 1250 kcals (25 - 28 kcals/kg)   Total Grams Protein / day: 50 - 59 g (1.1 - 1.3 g/kg)   Total Fluids ml / day: 1349.4 ml        Assessment / Evaluation:    1) pt on vent in need of nutrition support  2) Fibersource TF started per protocol  3) hyponatremia, MD monitoring fluid status and sodium provision    Plan / Recommendation:    1) Fibersource full goal rate 45 ml/hr will provide 1296 kcals, 57 g protein, 864 ml H20/day  2) no need to adjust TF for current propofol  3) po diet when appropriate

## 2017-02-03 ENCOUNTER — APPOINTMENT (OUTPATIENT)
Dept: RADIOLOGY | Facility: MEDICAL CENTER | Age: 62
DRG: 101 | End: 2017-02-03
Attending: INTERNAL MEDICINE
Payer: MEDICAID

## 2017-02-03 LAB
ANION GAP SERPL CALC-SCNC: 4 MMOL/L (ref 0–11.9)
ANION GAP SERPL CALC-SCNC: 5 MMOL/L (ref 0–11.9)
ANION GAP SERPL CALC-SCNC: 5 MMOL/L (ref 0–11.9)
BASE EXCESS BLDA CALC-SCNC: 0 MMOL/L (ref -4–3)
BASOPHILS # BLD AUTO: 0.2 % (ref 0–1.8)
BASOPHILS # BLD: 0.02 K/UL (ref 0–0.12)
BODY TEMPERATURE: ABNORMAL DEGREES
BUN SERPL-MCNC: 4 MG/DL (ref 8–22)
BUN SERPL-MCNC: 5 MG/DL (ref 8–22)
BUN SERPL-MCNC: 6 MG/DL (ref 8–22)
BUN SERPL-MCNC: 7 MG/DL (ref 8–22)
BUN SERPL-MCNC: 7 MG/DL (ref 8–22)
CALCIUM SERPL-MCNC: 7.5 MG/DL (ref 8.5–10.5)
CALCIUM SERPL-MCNC: 7.9 MG/DL (ref 8.5–10.5)
CALCIUM SERPL-MCNC: 7.9 MG/DL (ref 8.5–10.5)
CALCIUM SERPL-MCNC: 8.3 MG/DL (ref 8.5–10.5)
CALCIUM SERPL-MCNC: 8.3 MG/DL (ref 8.5–10.5)
CHLORIDE SERPL-SCNC: 87 MMOL/L (ref 96–112)
CHLORIDE SERPL-SCNC: 88 MMOL/L (ref 96–112)
CHLORIDE SERPL-SCNC: 90 MMOL/L (ref 96–112)
CHLORIDE SERPL-SCNC: 96 MMOL/L (ref 96–112)
CHLORIDE SERPL-SCNC: 99 MMOL/L (ref 96–112)
CO2 BLDA-SCNC: 23 MMOL/L (ref 20–33)
CO2 SERPL-SCNC: 23 MMOL/L (ref 20–33)
CO2 SERPL-SCNC: 23 MMOL/L (ref 20–33)
CO2 SERPL-SCNC: 24 MMOL/L (ref 20–33)
CO2 SERPL-SCNC: 24 MMOL/L (ref 20–33)
CO2 SERPL-SCNC: 26 MMOL/L (ref 20–33)
CREAT SERPL-MCNC: 0.21 MG/DL (ref 0.5–1.4)
CREAT SERPL-MCNC: 0.23 MG/DL (ref 0.5–1.4)
CREAT SERPL-MCNC: 0.23 MG/DL (ref 0.5–1.4)
CREAT SERPL-MCNC: 0.24 MG/DL (ref 0.5–1.4)
CREAT SERPL-MCNC: 0.26 MG/DL (ref 0.5–1.4)
EOSINOPHIL # BLD AUTO: 0 K/UL (ref 0–0.51)
EOSINOPHIL NFR BLD: 0 % (ref 0–6.9)
ERYTHROCYTE [DISTWIDTH] IN BLOOD BY AUTOMATED COUNT: 44.9 FL (ref 35.9–50)
FOLATE SERPL-MCNC: 22.2 NG/ML
GFR SERPL CREATININE-BSD FRML MDRD: >60 ML/MIN/1.73 M 2
GLUCOSE SERPL-MCNC: 108 MG/DL (ref 65–99)
GLUCOSE SERPL-MCNC: 114 MG/DL (ref 65–99)
GLUCOSE SERPL-MCNC: 119 MG/DL (ref 65–99)
GLUCOSE SERPL-MCNC: 133 MG/DL (ref 65–99)
GLUCOSE SERPL-MCNC: 155 MG/DL (ref 65–99)
HCO3 BLDA-SCNC: 22.3 MMOL/L (ref 17–25)
HCT VFR BLD AUTO: 29.6 % (ref 37–47)
HGB BLD-MCNC: 10.9 G/DL (ref 12–16)
HGB RETIC QN AUTO: 38.2 PG/CELL (ref 29–35)
IMM GRANULOCYTES # BLD AUTO: 0.04 K/UL (ref 0–0.11)
IMM GRANULOCYTES NFR BLD AUTO: 0.4 % (ref 0–0.9)
IMM RETICS NFR: 6.6 % (ref 9.3–17.4)
IRON SATN MFR SERPL: 8 % (ref 15–55)
IRON SERPL-MCNC: 26 UG/DL (ref 40–170)
LYMPHOCYTES # BLD AUTO: 0.67 K/UL (ref 1–4.8)
LYMPHOCYTES NFR BLD: 6.8 % (ref 22–41)
MCH RBC QN AUTO: 35 PG (ref 27–33)
MCHC RBC AUTO-ENTMCNC: 36.8 G/DL (ref 33.6–35)
MCV RBC AUTO: 95.2 FL (ref 81.4–97.8)
MONOCYTES # BLD AUTO: 1.01 K/UL (ref 0–0.85)
MONOCYTES NFR BLD AUTO: 10.2 % (ref 0–13.4)
NEUTROPHILS # BLD AUTO: 8.13 K/UL (ref 2–7.15)
NEUTROPHILS NFR BLD: 82.4 % (ref 44–72)
NRBC # BLD AUTO: 0 K/UL
NRBC BLD AUTO-RTO: 0 /100 WBC
O2/TOTAL GAS SETTING VFR VENT: 40 %
PCO2 BLDA: 30.2 MMHG (ref 26–37)
PCO2 TEMP ADJ BLDA: 31.7 MMHG (ref 26–37)
PH BLDA: 7.48 [PH] (ref 7.4–7.5)
PH TEMP ADJ BLDA: 7.46 [PH] (ref 7.4–7.5)
PLATELET # BLD AUTO: 139 K/UL (ref 164–446)
PMV BLD AUTO: 9.8 FL (ref 9–12.9)
PO2 BLDA: 39 MMHG (ref 64–87)
PO2 TEMP ADJ BLDA: 43 MMHG (ref 64–87)
POTASSIUM SERPL-SCNC: 3.4 MMOL/L (ref 3.6–5.5)
POTASSIUM SERPL-SCNC: 3.5 MMOL/L (ref 3.6–5.5)
POTASSIUM SERPL-SCNC: 3.7 MMOL/L (ref 3.6–5.5)
POTASSIUM SERPL-SCNC: 3.8 MMOL/L (ref 3.6–5.5)
POTASSIUM SERPL-SCNC: 3.9 MMOL/L (ref 3.6–5.5)
POTASSIUM SERPL-SCNC: 4.1 MMOL/L (ref 3.6–5.5)
RBC # BLD AUTO: 3.11 M/UL (ref 4.2–5.4)
RETICS # AUTO: 0.06 M/UL (ref 0.04–0.06)
RETICS/RBC NFR: 2 % (ref 0.8–2.1)
SAO2 % BLDA: 79 % (ref 93–99)
SODIUM SERPL-SCNC: 115 MMOL/L (ref 135–145)
SODIUM SERPL-SCNC: 117 MMOL/L (ref 135–145)
SODIUM SERPL-SCNC: 119 MMOL/L (ref 135–145)
SODIUM SERPL-SCNC: 123 MMOL/L (ref 135–145)
SODIUM SERPL-SCNC: 128 MMOL/L (ref 135–145)
SPECIMEN DRAWN FROM PATIENT: ABNORMAL
TIBC SERPL-MCNC: 325 UG/DL (ref 250–450)
VIT B12 SERPL-MCNC: 779 PG/ML (ref 211–911)
WBC # BLD AUTO: 9.9 K/UL (ref 4.8–10.8)

## 2017-02-03 PROCEDURE — 700111 HCHG RX REV CODE 636 W/ 250 OVERRIDE (IP): Performed by: HOSPITALIST

## 2017-02-03 PROCEDURE — 83550 IRON BINDING TEST: CPT

## 2017-02-03 PROCEDURE — 36600 WITHDRAWAL OF ARTERIAL BLOOD: CPT

## 2017-02-03 PROCEDURE — 85046 RETICYTE/HGB CONCENTRATE: CPT

## 2017-02-03 PROCEDURE — 700102 HCHG RX REV CODE 250 W/ 637 OVERRIDE(OP): Performed by: INTERNAL MEDICINE

## 2017-02-03 PROCEDURE — A9270 NON-COVERED ITEM OR SERVICE: HCPCS | Performed by: INTERNAL MEDICINE

## 2017-02-03 PROCEDURE — 700105 HCHG RX REV CODE 258: Performed by: HOSPITALIST

## 2017-02-03 PROCEDURE — 82803 BLOOD GASES ANY COMBINATION: CPT

## 2017-02-03 PROCEDURE — 82746 ASSAY OF FOLIC ACID SERUM: CPT

## 2017-02-03 PROCEDURE — 71010 DX-CHEST-PORTABLE (1 VIEW): CPT

## 2017-02-03 PROCEDURE — 74000 DX-ABDOMEN-1 VIEW: CPT

## 2017-02-03 PROCEDURE — 99233 SBSQ HOSP IP/OBS HIGH 50: CPT | Mod: GC | Performed by: INTERNAL MEDICINE

## 2017-02-03 PROCEDURE — 84132 ASSAY OF SERUM POTASSIUM: CPT

## 2017-02-03 PROCEDURE — 700111 HCHG RX REV CODE 636 W/ 250 OVERRIDE (IP): Performed by: INTERNAL MEDICINE

## 2017-02-03 PROCEDURE — 700111 HCHG RX REV CODE 636 W/ 250 OVERRIDE (IP)

## 2017-02-03 PROCEDURE — 82607 VITAMIN B-12: CPT

## 2017-02-03 PROCEDURE — 770022 HCHG ROOM/CARE - ICU (200)

## 2017-02-03 PROCEDURE — 80048 BASIC METABOLIC PNL TOTAL CA: CPT

## 2017-02-03 PROCEDURE — 94003 VENT MGMT INPAT SUBQ DAY: CPT

## 2017-02-03 PROCEDURE — 83540 ASSAY OF IRON: CPT

## 2017-02-03 PROCEDURE — 85025 COMPLETE CBC W/AUTO DIFF WBC: CPT

## 2017-02-03 RX ORDER — POTASSIUM CHLORIDE 7.45 MG/ML
10 INJECTION INTRAVENOUS ONCE
Status: COMPLETED | OUTPATIENT
Start: 2017-02-03 | End: 2017-02-03

## 2017-02-03 RX ORDER — POTASSIUM CHLORIDE 14.9 MG/ML
20 INJECTION INTRAVENOUS ONCE
Status: COMPLETED | OUTPATIENT
Start: 2017-02-03 | End: 2017-02-03

## 2017-02-03 RX ORDER — ACETAMINOPHEN 500 MG
500 TABLET ORAL EVERY 6 HOURS PRN
Status: DISCONTINUED | OUTPATIENT
Start: 2017-02-03 | End: 2017-02-08 | Stop reason: HOSPADM

## 2017-02-03 RX ORDER — POTASSIUM CHLORIDE 14.9 MG/ML
20 INJECTION INTRAVENOUS ONCE
Status: COMPLETED | OUTPATIENT
Start: 2017-02-03 | End: 2017-02-04

## 2017-02-03 RX ORDER — POTASSIUM CHLORIDE 14.9 MG/ML
20 INJECTION INTRAVENOUS ONCE
Status: DISCONTINUED | OUTPATIENT
Start: 2017-02-03 | End: 2017-02-03

## 2017-02-03 RX ORDER — POTASSIUM CHLORIDE 7.45 MG/ML
10 INJECTION INTRAVENOUS ONCE
Status: DISCONTINUED | OUTPATIENT
Start: 2017-02-03 | End: 2017-02-03

## 2017-02-03 RX ORDER — SODIUM CHLORIDE 9 MG/ML
INJECTION, SOLUTION INTRAVENOUS CONTINUOUS
Status: DISCONTINUED | OUTPATIENT
Start: 2017-02-03 | End: 2017-02-06

## 2017-02-03 RX ORDER — POTASSIUM CHLORIDE 7.45 MG/ML
10 INJECTION INTRAVENOUS 2 TIMES DAILY
Status: DISCONTINUED | OUTPATIENT
Start: 2017-02-03 | End: 2017-02-03

## 2017-02-03 RX ORDER — POTASSIUM CHLORIDE 14.9 MG/ML
20 INJECTION INTRAVENOUS 2 TIMES DAILY
Status: DISCONTINUED | OUTPATIENT
Start: 2017-02-03 | End: 2017-02-03

## 2017-02-03 RX ADMIN — POTASSIUM CHLORIDE 20 MEQ: 200 INJECTION, SOLUTION INTRAVENOUS at 01:29

## 2017-02-03 RX ADMIN — FENTANYL CITRATE 50 MCG: 50 INJECTION, SOLUTION INTRAMUSCULAR; INTRAVENOUS at 20:19

## 2017-02-03 RX ADMIN — CHLORHEXIDINE GLUCONATE 15 ML: 1.2 RINSE ORAL at 08:41

## 2017-02-03 RX ADMIN — POTASSIUM CHLORIDE 20 MEQ: 14.9 INJECTION, SOLUTION INTRAVENOUS at 22:12

## 2017-02-03 RX ADMIN — POTASSIUM CHLORIDE 20 MEQ: 14.9 INJECTION, SOLUTION INTRAVENOUS at 14:46

## 2017-02-03 RX ADMIN — SODIUM CHLORIDE: 9 INJECTION, SOLUTION INTRAVENOUS at 05:59

## 2017-02-03 RX ADMIN — ACETAMINOPHEN 500 MG: 500 TABLET, FILM COATED ORAL at 16:02

## 2017-02-03 RX ADMIN — POTASSIUM CHLORIDE 10 MEQ: 10 INJECTION, SOLUTION INTRAVENOUS at 14:46

## 2017-02-03 RX ADMIN — FAMOTIDINE 20 MG: 10 INJECTION INTRAVENOUS at 20:12

## 2017-02-03 RX ADMIN — POTASSIUM CHLORIDE 10 MEQ: 10 INJECTION, SOLUTION INTRAVENOUS at 20:24

## 2017-02-03 RX ADMIN — FAMOTIDINE 20 MG: 20 TABLET, FILM COATED ORAL at 09:00

## 2017-02-03 RX ADMIN — FENTANYL CITRATE 100 MCG: 50 INJECTION, SOLUTION INTRAMUSCULAR; INTRAVENOUS at 01:30

## 2017-02-03 RX ADMIN — ENOXAPARIN SODIUM 30 MG: 100 INJECTION SUBCUTANEOUS at 08:41

## 2017-02-03 RX ADMIN — POTASSIUM CHLORIDE 20 MEQ: 14.9 INJECTION, SOLUTION INTRAVENOUS at 08:42

## 2017-02-03 ASSESSMENT — PAIN SCALES - GENERAL
PAINLEVEL_OUTOF10: 0

## 2017-02-03 ASSESSMENT — COPD QUESTIONNAIRES
DO YOU EVER COUGH UP ANY MUCUS OR PHLEGM?: NO/ONLY WITH OCCASIONAL COLDS OR INFECTIONS
HAVE YOU SMOKED AT LEAST 100 CIGARETTES IN YOUR ENTIRE LIFE: NO/DON'T KNOW
COPD SCREENING SCORE: 2
DURING THE PAST 4 WEEKS HOW MUCH DID YOU FEEL SHORT OF BREATH: NONE/LITTLE OF THE TIME

## 2017-02-03 ASSESSMENT — LIFESTYLE VARIABLES: EVER_SMOKED: NEVER

## 2017-02-03 NOTE — PROGRESS NOTES
Lab called with critical result of Sodium of 117 at 0715. Critical lab result read back to Lab.   Dr. Ye notified of critical lab result at 0730.  Critical lab result read back by Dr. Ye.

## 2017-02-03 NOTE — DISCHARGE PLANNING
"Care Transition Team Assessment    Admit for ALOC and seizure activity with neighbor present.   Patient remains confused.      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  Lives alone and unable to care for self.  Has privately hires caregiver (Gonzalo) and Neighbor Viv. Caregiver is $20 per hour.  Schedule   M-630 AM, leaves and back before dark.  Tu and Wed starts at 430 AM and 1 hour at Night.  Th and Fri All day.  Sat and Sun arrives at 630 till PM.  Patient's mother  and left her $18,000.  Per Neighbor patient has only $4000 remaining. viv states she has possession now of this remaining $4000.   No steady income. Has [employer].  No PCP listed.     Per Neighbor. Patient is hoarder, floor covered in animal and human feces.  Carpet soaked in urine. patient is incontinent, uses adult under garments. House is a mess. Piled high with bags and clothing. Patient has two Chihuahua's and neighbor Viv is caring for them. Per neighbor, patient talks about suicide every day.  Viv's  has removed all firearms form the home. She drinks 6-10 Foster beers daily with Benzodiazepines and Norco. Doesn't eat much. Plus Ensure.  Caregiver is using patient's vehicle for errands Home is \"all shut up.\"  \" It's a shack.\"  patient in Non ambulatory and wheel chair bound.      Based on the above information a report to Elder protective services is warranted.           Information Source  Orientation : Disoriented to Time  Information Given By: Friend  Informant's Name:  (Viv (Neighbor) [phone])  Who is responsible for making decisions for patient? : Patient    Readmission Evaluation  Is this a readmission?:  (Last admit  Failure to thrive. )    Elopement Risk  Legal Hold: No    Interdisciplinary Discharge Planning  Does Admitting Nurse Feel This Could be a Complex Discharge?: Yes  Primary Care Physician:  (Unknown)    Lives with - Patient's Self Care Capacity: Alone and Unable to Care For " "Self  Support Systems:  (Neighbor Viv and hired caregiver Gonzalo)  Housing / Facility: [address]  Do You Take your Prescribed Medications Regularly: Yes    Able to Return to Previous ADL's: Future Time w/Therapy  Mobility Issues: Yes  Prior Services: shelter [facility]  Patient Expects to be Discharged to::  (Home.  \"I want to go Home.\")  Assistance Needed: Yes  Durable Medical Equipment:  (Wheel chair)    Discharge Preparedness  What is your plan after discharge?:  (I want to go home)  What are your discharge supports?:  (Hired caregiver and neighbor)  Prior Functional Level: Bladder Incontinence, Needs Assist with Activities of Daily Living, Needs Assist with Medication Management, Total Assist, Uses Wheelchair  Difficulty with ADLs:  (Cannot walk)  Difficulty with IADLs: Cooking, Laundry, Shopping  Difficulty with IADL Comments:  (Unable to care for self.)    Functional Assessment  Prior Functional Level: Bladder Incontinence, Needs Assist with Activities of Daily Living, Needs Assist with Medication Management, Total Assist, Uses Wheelchair    Finances  Financial Barriers to Discharge:  (Refusing rehab/snf placement)  Prescription Coverage: Yes    Values / Beliefs / Concerns  Values / Beliefs Concerns :  (I want to go home!)    Advance Directive  Advance Directive?: None  Advance Directive offered?:  (Confused)    Domestic Abuse  Have you ever been the victim of abuse or violence?:  (Unknown)    Psychological Assessment  History of Substance Abuse: Alcohol, Prescription opioids  Date Last Used - Prescription Opioids:  (02/01/17)    Discharge Risks or Barriers  Discharge risks or barriers?:  (Unable to care for self without Caregiver. )  Patient risk factors:  (Cannot walk, No heat at home other than small space heater)    Anticipated Discharge Information  Anticipated discharge disposition:  (TBD)  Discharge Address:  ([address] [facility] )  Discharge Contact Phone Number:  ([phone])        "

## 2017-02-03 NOTE — DISCHARGE PLANNING
BIB [facility].      No mobility due to seizure precautions. Neighbor found patient altered.  Per EMS, pt is hoarder, and house was filthy.  Severity unknown.     Previous admit 13. Failure to thrive.   No mention of asking patient for family at that time.       60 y/o single female.  [place] resident with [employer] [facility] insurance.      Left message for neighbor in hopes of contacting NOK.   [facility] search completed.  Results MARK CANELA [phone] [id number] [facility] [place] [phone]   :  (87)  Called number. No Answer      Extubated.  Will follow for POC.   Need Bedside swallow.

## 2017-02-03 NOTE — CARE PLAN
Problem: Communication  Goal: The ability to communicate needs accurately and effectively will improve  Outcome: PROGRESSING SLOWER THAN EXPECTED  Pt unable to fully communicate. Only shakes/nods head to some questions. Repeat reorientation provided.     Problem: Skin Integrity  Goal: Risk for impaired skin integrity will decrease  Outcome: PROGRESSING AS EXPECTED  Skin assessed including under medical devices. No breakdown noted. Blanchable redness to sacrum. Mepilex in place. Frequent repositioning completed.

## 2017-02-03 NOTE — PROGRESS NOTES
[facility] ICU Progress Note    ICU Attending/Resident: Dr Mai/Ephraim    Admit Date: 2/1/2017    ICU day: 2    Patient ID:     Name:             Fabiola Chacon   Date of birth:   1955  Age:                 61   MRN:               [id number]    HPI:  61 year old White female with PMH of chronic back pain, scoliosis, mood disorder admitted to ICU for seizure secondary to hyponatremia after neighbors sent her to the ED with altered mental status.    Diagnosis:  Hyponatremia  Seizure  Scoliosis  Protein/calorie malnutrition  Ileus  History of behavioral issues      Interval Events:  Admitted to ICU to 1/20/17 with Na+ 108, slowly corrected now 119  Running normal saline at 50 mL per hour  BMP every 4 hours  No seizure activity since night of admission  Started on tube feeds, now slightly distended, passing gas  Slightly more history taken from neighbor, apparently the patient has not been taking care of herself or eating in recent history. Neighbor states patient is very depressed, possibly suicidal.    =====================================================  Objective - Systems Based   =====================================================  Fluids:  Intake/Output Summary (Last 24 hours) at 02/02/17 0749  Last data filed at 02/02/17 0600   Gross per 24 hour   Intake 1063.66 ml   Output    475 ml   Net 588.66 ml       Weight: 47.9 kg (105 lb 9.6 oz)  Body mass index is 20.62 kg/(m^2).    Recent Labs      02/03/17   0001  02/03/17   0410  02/03/17   0605  02/03/17   0905   SODIUM  117*  115*   --   119*   POTASSIUM  3.9  3.8  4.1  3.7   CHLORIDE  90*  87*   --   88*   CO2  23  24   --   26   BUN  7*  7*   --   6*   CREATININE  0.24*  0.21*   --   0.26*   CALCIUM  7.9*  8.3*   --   8.3*     =====================================================    Review of Systems   Unable to perform ROS: intubated       GEN:  Intubated and sedated, responds to commands    HEENT:  Normocephalic/atraumatic, PERRL    EXT:  Perfusing and moving  all extremities. Distal pulses intact    NEURO:  No obvious focal deficits, reflexes intact. Seizure x3, last at 3am    Respiratory/Pulmonary:  Jameson Vent Mode: Spont  Respiration: 17, Pulse Oximetry: 97 %      HemoDynamics/Cardiovascular:  Pulse: 88, Heart Rate (Monitored): (!) 106 NIBP: (!) 97/56 mmHg CVP (mm Hg): (!) 8 MM HG      GI/Nutrition:  Cortrack placed, tube feeding started    Recent Labs      17   1545   17   0001  17   0410  17   0905   ALTSGPT  29   --    --    --    --    --    --    ASTSGOT  76*   --    --    --    --    --    --    ALKPHOSPHAT  71   --    --    --    --    --    --    TBILIRUBIN  1.2   --    --    --    --    --    --    PREALBUMIN   --    --   19.0   --    --    --    --    GLUCOSE  120*   < >   --    < >  155*  119*  133*    < > = values in this interval not displayed.       Heme/Onc:  Recent Labs      17   0001  17   0500   RBC  3.20*  3.77*   --   3.11*   HEMOGLOBIN  10.5*  12.9   --   10.9*   HEMATOCRIT  29.0*  34.9*   --   29.6*   PLATELETCT  172  183   --   139*   IRON   --    --   26*   --    TOTIRONBC   --    --   325   --        Infectious Disease:  Temp  Av.3 °C (99.1 °F)  Min: 36.8 °C (98.2 °F)  Max: 38.2 °C (100.8 °F)  Recent Labs      17   0210  17   0500   WBC  9.7  12.4*  9.9   NEUTSPOLYS  82.00*  83.60*  82.40*   LYMPHOCYTES  7.00*  6.00*  6.80*   MONOCYTES  10.30  9.70  10.20   EOSINOPHILS  0.00  0.00  0.00   BASOPHILS  0.10  0.20  0.20   ASTSGOT  76*   --    --    ALTSGPT  29   --    --    ALKPHOSPHAT  71   --    --    TBILIRUBIN  1.2   --    --      • NS   50 mL/hr at 17 0559   • acetaminophen  500 mg     • Respiratory Care per Protocol       • chlorhexidine  15 mL     • lidocaine  1-2 mL     • fentaNYL  25 mcg      Or   • fentaNYL  50 mcg      Or   • fentaNYL  100 mcg     • famotidine  20 mg      Or   • famotidine  20 mg     • K+  Scale: Goal of 5  1 Each     • propofol  5-80 mcg/kg/min 50 mcg/kg/min (02/03/17 7104)   • lorazepam  1 mg      Or   • lorazepam  2 mg      Or   • lorazepam  3 mg      Or   • lorazepam  4 mg      Or   • lorazepam  5 mg     • docusate sodium  100 mg      And   • senna-docusate  1 Tab      And   • senna-docusate  1 Tab      And   • lactulose  30 mL      And   • bisacodyl  10 mg      And   • fleet  1 Each     • enoxaparin  30 mg          =====================================================  Procedures and Imaging:  [id number] VIEW   Final Result      1.  There is are air-filled large and small bowel loops consistent with ileus. There is no significant bowel dilatation. This is new since the previous study.   2.  The stomach is dilated with air. The nasogastric tube tip is projecting over the antrum.      DX-CHEST-PORTABLE (1 VIEW)   Final Result      No significant change from prior exam.      DX-ABDOMEN FOR TUBE PLACEMENT   Final Result      Feeding tube tip overlies the gastric antrum.      DX-CHEST-PORTABLE (1 VIEW)   Final Result      1.  Patchy LEFT lung base infiltrate or atelectasis with probable small LEFT pleural effusion.   2.  Supportive tubing as described above.   3.  No pneumothorax.      CT-HEAD W/O   Final Result      1.  Motion artifact limits exam.   2.  No acute intracranial abnormality.          =====================================================  Problem and Plan:  1. Altered mentation  * Tonic-clonic seizures  -secondary to serum Na+108 on admission  -received 3%NaCl. 250 cc   -Slowly repleted, now 119  -Target for today is ~125  -CT head neg  -Utox +for opiates and MJ  -Continue NS 50mL/hr, sodium to 115  -Seizure/fall/aspiraton risk precautions.  -Q4hrs neuro checks.  -Continue tube feeds    2. Hyponatremia  -etiology is unknown, but appears hypovolemic.  -Requested outside records.  -urine osm/lytes pending  -Na levels with intervention 108-->117-->115    3. Ileus  Patient moderately  tympanic slightly distended on abdominal exam.  -Bedside x-ray discloses large amount of gas and small bowel, possible ileus  -Patient is passing gas  -Close clinical monitor    4. Normocytic anemia  -FOBT pending  -no obvious source of bleeding  -Likely nutritional  -B12/folate within normal limits  -Decreased iron 26, 8% percent saturation  -Consider oral supplementation upon resolution of ileus    5. Elevated AST  -possibly alcoholic  -will trend    6. Chronic neck and back pain  -judicious pain control in setting of AMS    7. Insomnia  -home med is klonipin  -Continue prn ativan for seizures for now    Quality Measures:  Alcantar Catheter: Critically ill  DVT Prophylaxis: Lovenox  Ulcer Prophylaxis: Omeprazole  Antibiotics: Not indicated  Lines: RIJ  ======================================================

## 2017-02-03 NOTE — PROGRESS NOTES
Mita from Lab called with critical result of Sodium of 118 at 1105. Critical lab result read back to Mita.   Dr. Ye notified of critical lab result at 1115.  Critical lab result read back by Dr. Ye.

## 2017-02-03 NOTE — FLOWSHEET NOTE
Extubation    Cuff leak noted yes  Stridor present no     Patient toleration well  RCP Complete? yes  Events/Summary/Plan: Patient extubated per Dr Mai (02/03/17 0900)

## 2017-02-03 NOTE — CARE PLAN
Problem: Ventilation Defect:  Goal: Ability to achieve and maintain unassisted ventilation or tolerate decreased levels of ventilator support  Outcome: PROGRESSING AS EXPECTED  Intervention: Support and monitor invasive and noninvasive mechanical ventilation  Adult Ventilation Update    Total Vent Days: 2      Patient Lines/Drains/Airways Status    Active Airway      Name: Placement date: Placement time: Site: Days:     Airway Group ET Tube Oral 7.0 02/02/17  0005  Oral  1                  Plateau Pressure (Q Shift): 14 (02/02/17 0231)  Static Compliance (ml / cm H2O): 35 (02/03/17 0222)    Pt remains on vent with no changes overnight. Pt was transferred to [facility].

## 2017-02-03 NOTE — CARE PLAN
Problem: Ventilation Defect:  Goal: Ability to achieve and maintain unassisted ventilation or tolerate decreased levels of ventilator support  Intervention: Support and monitor invasive and noninvasive mechanical ventilation  Adult Ventilation Update    Total Vent Days: 1      Patient Lines/Drains/Airways Status    Active Airway      Name: Placement date: Placement time: Site: Days:     Airway Group ET Tube Oral 7.0 02/02/17  0005  Oral  less than 1               Plateau Pressure (Q Shift): 14 (02/02/17 0231)  Static Compliance (ml / cm H2O): 78 (02/02/17 1616)      Sputum/Suction   Cough: Productive  (02/02/17 0231)  Sputum Amount: Small (02/02/17 0800)  Sputum Color: Brown (02/02/17 0800)  Sputum Consistency: Thin (02/02/17 0800)    Mobility Group  Activity Performed: Unable to mobilize (02/02/17 0800)  Reason Not Mobilized: Bed rest (02/02/17 0800)    Events/Summary/Plan: no sbt at this time (02/02/17 1616)

## 2017-02-03 NOTE — PROGRESS NOTES
Mita from Lab called with critical result of Sodium of 118 at 1230. Critical lab result read back to Mita.   Dr. Ye notified of critical lab result at 1300.  Critical lab result read back by Dr. Ye.

## 2017-02-03 NOTE — PROGRESS NOTES
Paged by nursing, notified of serum sodium level 122. As patient sodium was 108 at ~1800 yesterday, rate of correction places increased risk for complications such as central pontine myelinosis. Will start DDAVP for free water retention, as well as 270mL D5W (6mL/kg lean body mass) over two hours. Will recheck sodium q4h.

## 2017-02-03 NOTE — PROGRESS NOTES
Late entry:    Received report from Gavino SICU RN. Patient transported to T638 by SICU staff. Assumed care of patient at 0400. Lines and gtts verified. Plan of care discussed with patient.

## 2017-02-03 NOTE — PROGRESS NOTES
Lab called with critical NA+ level of 115. Sodium level read back to .    Dr. Morrissey notified at 0505; orders received for NS infusion at 50ml/hr. Orders read back to MD and implemented.

## 2017-02-04 ENCOUNTER — APPOINTMENT (OUTPATIENT)
Dept: RADIOLOGY | Facility: MEDICAL CENTER | Age: 62
DRG: 101 | End: 2017-02-04
Attending: INTERNAL MEDICINE
Payer: MEDICAID

## 2017-02-04 LAB
ANION GAP SERPL CALC-SCNC: 6 MMOL/L (ref 0–11.9)
BACTERIA SPEC RESP CULT: ABNORMAL
BACTERIA SPEC RESP CULT: ABNORMAL
BUN SERPL-MCNC: 4 MG/DL (ref 8–22)
CALCIUM SERPL-MCNC: 8.2 MG/DL (ref 8.5–10.5)
CHLORIDE SERPL-SCNC: 96 MMOL/L (ref 96–112)
CO2 SERPL-SCNC: 24 MMOL/L (ref 20–33)
CREAT SERPL-MCNC: 0.24 MG/DL (ref 0.5–1.4)
EKG IMPRESSION: NORMAL
GFR SERPL CREATININE-BSD FRML MDRD: >60 ML/MIN/1.73 M 2
GLUCOSE SERPL-MCNC: 112 MG/DL (ref 65–99)
GRAM STN SPEC: ABNORMAL
POTASSIUM SERPL-SCNC: 3.5 MMOL/L (ref 3.6–5.5)
POTASSIUM SERPL-SCNC: 3.8 MMOL/L (ref 3.6–5.5)
SIGNIFICANT IND 70042: ABNORMAL
SITE SITE: ABNORMAL
SODIUM SERPL-SCNC: 126 MMOL/L (ref 135–145)
SOURCE SOURCE: ABNORMAL

## 2017-02-04 PROCEDURE — 700102 HCHG RX REV CODE 250 W/ 637 OVERRIDE(OP): Performed by: HOSPITALIST

## 2017-02-04 PROCEDURE — A9270 NON-COVERED ITEM OR SERVICE: HCPCS | Performed by: HOSPITALIST

## 2017-02-04 PROCEDURE — 770022 HCHG ROOM/CARE - ICU (200)

## 2017-02-04 PROCEDURE — 700105 HCHG RX REV CODE 258: Performed by: HOSPITALIST

## 2017-02-04 PROCEDURE — 700111 HCHG RX REV CODE 636 W/ 250 OVERRIDE (IP): Performed by: HOSPITALIST

## 2017-02-04 PROCEDURE — 36415 COLL VENOUS BLD VENIPUNCTURE: CPT

## 2017-02-04 PROCEDURE — 71010 DX-CHEST-PORTABLE (1 VIEW): CPT

## 2017-02-04 PROCEDURE — 84132 ASSAY OF SERUM POTASSIUM: CPT

## 2017-02-04 PROCEDURE — A9270 NON-COVERED ITEM OR SERVICE: HCPCS | Performed by: INTERNAL MEDICINE

## 2017-02-04 PROCEDURE — 700111 HCHG RX REV CODE 636 W/ 250 OVERRIDE (IP): Performed by: INTERNAL MEDICINE

## 2017-02-04 PROCEDURE — 99233 SBSQ HOSP IP/OBS HIGH 50: CPT | Mod: GC | Performed by: INTERNAL MEDICINE

## 2017-02-04 PROCEDURE — 700102 HCHG RX REV CODE 250 W/ 637 OVERRIDE(OP): Performed by: INTERNAL MEDICINE

## 2017-02-04 RX ORDER — QUETIAPINE FUMARATE 25 MG/1
25 TABLET, FILM COATED ORAL ONCE
Status: DISCONTINUED | OUTPATIENT
Start: 2017-02-04 | End: 2017-02-04

## 2017-02-04 RX ORDER — HYDROCODONE BITARTRATE AND ACETAMINOPHEN 5; 325 MG/1; MG/1
1 TABLET ORAL EVERY 4 HOURS PRN
Status: DISCONTINUED | OUTPATIENT
Start: 2017-02-04 | End: 2017-02-08 | Stop reason: HOSPADM

## 2017-02-04 RX ORDER — HALOPERIDOL 5 MG/ML
1 INJECTION INTRAMUSCULAR ONCE
Status: COMPLETED | OUTPATIENT
Start: 2017-02-04 | End: 2017-02-04

## 2017-02-04 RX ORDER — POTASSIUM CHLORIDE 7.45 MG/ML
10 INJECTION INTRAVENOUS
Status: COMPLETED | OUTPATIENT
Start: 2017-02-04 | End: 2017-02-04

## 2017-02-04 RX ORDER — POTASSIUM CHLORIDE 20 MEQ/1
40 TABLET, EXTENDED RELEASE ORAL 2 TIMES DAILY
Status: DISCONTINUED | OUTPATIENT
Start: 2017-02-04 | End: 2017-02-08

## 2017-02-04 RX ORDER — POTASSIUM CHLORIDE 14.9 MG/ML
20 INJECTION INTRAVENOUS ONCE
Status: DISCONTINUED | OUTPATIENT
Start: 2017-02-04 | End: 2017-02-04 | Stop reason: DRUGHIGH

## 2017-02-04 RX ADMIN — HALOPERIDOL LACTATE 1 MG: 5 INJECTION, SOLUTION INTRAMUSCULAR at 04:13

## 2017-02-04 RX ADMIN — POTASSIUM CHLORIDE 40 MEQ: 1500 TABLET, EXTENDED RELEASE ORAL at 20:19

## 2017-02-04 RX ADMIN — HYDROCODONE BITARTRATE AND ACETAMINOPHEN 1 TABLET: 5; 325 TABLET ORAL at 20:18

## 2017-02-04 RX ADMIN — HYDROCODONE BITARTRATE AND ACETAMINOPHEN 1 TABLET: 5; 325 TABLET ORAL at 08:31

## 2017-02-04 RX ADMIN — SODIUM CHLORIDE: 9 INJECTION, SOLUTION INTRAVENOUS at 01:53

## 2017-02-04 RX ADMIN — FENTANYL CITRATE 50 MCG: 50 INJECTION, SOLUTION INTRAMUSCULAR; INTRAVENOUS at 00:08

## 2017-02-04 RX ADMIN — STANDARDIZED SENNA CONCENTRATE AND DOCUSATE SODIUM 1 TABLET: 8.6; 5 TABLET, FILM COATED ORAL at 20:19

## 2017-02-04 RX ADMIN — POTASSIUM CHLORIDE 10 MEQ: 10 INJECTION, SOLUTION INTRAVENOUS at 04:18

## 2017-02-04 RX ADMIN — ENOXAPARIN SODIUM 30 MG: 100 INJECTION SUBCUTANEOUS at 08:31

## 2017-02-04 RX ADMIN — FENTANYL CITRATE 50 MCG: 50 INJECTION, SOLUTION INTRAMUSCULAR; INTRAVENOUS at 04:07

## 2017-02-04 RX ADMIN — POTASSIUM CHLORIDE 10 MEQ: 10 INJECTION, SOLUTION INTRAVENOUS at 03:13

## 2017-02-04 RX ADMIN — SODIUM CHLORIDE: 9 INJECTION, SOLUTION INTRAVENOUS at 22:33

## 2017-02-04 RX ADMIN — POTASSIUM CHLORIDE 40 MEQ: 1500 TABLET, EXTENDED RELEASE ORAL at 11:30

## 2017-02-04 RX ADMIN — HYDROCODONE BITARTRATE AND ACETAMINOPHEN 1 TABLET: 5; 325 TABLET ORAL at 13:24

## 2017-02-04 RX ADMIN — POTASSIUM CHLORIDE 20 MEQ: 14.9 INJECTION, SOLUTION INTRAVENOUS at 01:53

## 2017-02-04 ASSESSMENT — PAIN SCALES - GENERAL
PAINLEVEL_OUTOF10: 7
PAINLEVEL_OUTOF10: 8
PAINLEVEL_OUTOF10: 7
PAINLEVEL_OUTOF10: 8
PAINLEVEL_OUTOF10: 6
PAINLEVEL_OUTOF10: 8
PAINLEVEL_OUTOF10: 4
PAINLEVEL_OUTOF10: 5
PAINLEVEL_OUTOF10: 4
PAINLEVEL_OUTOF10: 8
PAINLEVEL_OUTOF10: 7

## 2017-02-04 NOTE — PROGRESS NOTES
Patient refusing blood draws. Education provided, she still refuses. Patient also states that she is leaving at 0630 and that she has her caregiver coming to pick her up.     Dr. Mullins notified of all of the above. Orders entered in Epic by MD.

## 2017-02-04 NOTE — PROGRESS NOTES
Late entry:    Patient complains of hunger. Dr. Mullins notified and orders received to do a bedside swallow evaluation and order a diet for the patient if she passes the bedside swallow evaluation. Orders entered in Epic by MD.

## 2017-02-04 NOTE — PROGRESS NOTES
Patient passed bedside swallow evaluation. Diet ordered; patient is vegetarian. RN ran down to cafeteria to obtain vegetarian options for patient.

## 2017-02-04 NOTE — PROGRESS NOTES
Dr. Mullins notified that patient is on a Kscale, but Dr. Ye has ordered 30mEq of Potassium twice a daily. Dr. Mullins to DC the order in Epic. Kscale to be followed. MD also notified that patient pulled out her cortrak earlier today and has yet to have a swallow evaluation, so PO meds are being held. No new orders received.

## 2017-02-04 NOTE — CARE PLAN
Problem: Infection  Goal: Will remain free from infection  Intervention: Assess signs and symptoms of infection  Completed with assessment. No s/s of infection at this time.

## 2017-02-04 NOTE — PROGRESS NOTES
[facility] ICU Progress Note    Admission Date: 2/1/2017    ICU Day: 3    Admitting Attending/Resident: Dr. Garcia/Fili Harman Attending/Resident(s): Dr. Davila, Dr. Mai/Jose Huber    Consultants:     Interval Events:  Patient very uncooperative overnight with nursing staff  Pulled out cortrack, pulled out central line, refusing blood draws/labs, and placed on restraints  Overnight.  Threatened to leave AMA.    Spoke to patient in morning. Took her out of restraints have very long conversation  On the importance of cooperating with medical staff in order for us to help her.  She understands and agrees to be compliant with medical care.    Psych consult in for suicide ideations per social workers history/interview with  Patient's neighbor.    Transfer orders to medical placed.  =====================================================  Objective - Systems Based - Physical Exam Incorporated into Labs  =====================================================  Fluids:  Intake/Output Summary (Last 24 hours) at 02/04/17 1103  Last data filed at 02/04/17 0600   Gross per 24 hour   Intake   1860 ml   Output   3075 ml   Net  -1215 ml     Weight: 46.9 kg (103 lb 6.3 oz)  Body mass index is 20.19 kg/(m^2).    Recent Labs      02/03/17   1238  02/03/17   1840  02/03/17   2305  02/04/17   0101   SODIUM  123*  128*  126*   --    POTASSIUM  3.4*  3.5*  3.5*  3.8   CHLORIDE  96  99  96   --    CO2  23  24  24   --    BUN  5*  4*  4*   --    CREATININE  0.23*  0.23*  0.24*   --    CALCIUM  7.5*  7.9*  8.2*   --      =====================================================  GEN:  Nad; aaox3    HEENT:  Normocephalic/atraumatic, PERRL    EXT:  2+ pulses everywhere; no LE edema    NEURO:  No obvious focal deficits, reflexes intact.    Respiratory/Pulmonary:     Respiration: (!) 11, Pulse Oximetry: 100 %, O2 Daily Delivery Respiratory : Silicone Nasal Cannula  Rrr; no m/r/g    HemoDynamics/Cardiovascular:  Pulse: 83,  Heart Rate (Monitored): 84 NIBP: (!) 97/72 mmHg   Ctab; no w/c/r    GI/Nutrition:  +bs; nontender    Recent Labs      17   1545   17   1238  17   1840  17   2305   ALTSGPT  29   --    --    --    --    --    --    ASTSGOT  76*   --    --    --    --    --    --    ALKPHOSPHAT  71   --    --    --    --    --    --    TBILIRUBIN  1.2   --    --    --    --    --    --    PREALBUMIN   --    --   19.0   --    --    --    --    GLUCOSE  120*   < >   --    < >  114*  108*  112*    < > = values in this interval not displayed.     Heme/Onc:  Recent Labs      17   0001  17   0500   RBC  3.20*  3.77*   --   3.11*   HEMOGLOBIN  10.5*  12.9   --   10.9*   HEMATOCRIT  29.0*  34.9*   --   29.6*   PLATELETCT  172  183   --   139*   IRON   --    --   26*   --    TOTIRONBC   --    --   325   --      Infectious Disease:    Temp  Av.8 °C (100.1 °F)  Min: 37.5 °C (99.5 °F)  Max: 38 °C (100.4 °F)  Monitored Temp  Av.8 °C (100 °F)  Min: 37.3 °C (99.1 °F)  Max: 38.2 °C (100.8 °F)  Recent Labs      170  17   0500   WBC  9.7  12.4*  9.9   NEUTSPOLYS  82.00*  83.60*  82.40*   LYMPHOCYTES  7.00*  6.00*  6.80*   MONOCYTES  10.30  9.70  10.20   EOSINOPHILS  0.00  0.00  0.00   BASOPHILS  0.10  0.20  0.20   ASTSGOT  76*   --    --    ALTSGPT  29   --    --    ALKPHOSPHAT  71   --    --    TBILIRUBIN  1.2   --    --      • hydrocodone-acetaminophen  1 Tab     • potassium chloride (KCl)  40 mEq     • NS   50 mL/hr at 17 0153   • acetaminophen  500 mg     • Respiratory Care per Protocol       • lorazepam  1 mg      Or   • lorazepam  2 mg      Or   • lorazepam  3 mg      Or   • lorazepam  4 mg      Or   • lorazepam  5 mg     • docusate sodium  100 mg      And   • senna-docusate  1 Tab      And   • senna-docusate  1 Tab      And   • lactulose  30 mL      And   • bisacodyl  10 mg      And   • fleet  1 Each     •  enoxaparin  30 mg        =====================================================  Procedures and Imaging:  DX-CHEST-PORTABLE (1 VIEW)   Final Result         1. Interval extubation and removal of right IJ and feeding tube. No significant interval change.      [id number] VIEW   Final Result      1.  There is are air-filled large and small bowel loops consistent with ileus. There is no significant bowel dilatation. This is new since the previous study.   2.  The stomach is dilated with air. The nasogastric tube tip is projecting over the antrum.      DX-CHEST-PORTABLE (1 VIEW)   Final Result      No significant change from prior exam.      DX-ABDOMEN FOR TUBE PLACEMENT   Final Result      Feeding tube tip overlies the gastric antrum.      DX-CHEST-PORTABLE (1 VIEW)   Final Result      1.  Patchy LEFT lung base infiltrate or atelectasis with probable small LEFT pleural effusion.   2.  Supportive tubing as described above.   3.  No pneumothorax.      CT-HEAD W/O   Final Result      1.  Motion artifact limits exam.   2.  No acute intracranial abnormality.        =====================================================  Problem and Plan:  Suicide ideation/unspecified mood disorder  Conversation with  and patient's neighbor is that patient is a hoarder  Floor covered in animal and human feces. Carpet soaked in urine. Per neighbor patient  Talks about suicide all the time, neighbor's  has removed all firearms from  Patient's home.   Upon interviewing today patient aaox4 and denies SI and HI    Psychiatry consult placed    Altered mentation  Tonic-clonic seizures  Hyponatremia  secondary to serum Na+108 on admission  Last Na = 126, 2/3/2017  Cont NSL    Ileus  abd x-ray showed large amount of gas and small bowel, possible ileus  Patient is passing gas  Close clinical monitoring    Normocytic anemia  FOBT pending  no obvious source of bleeding  Likely nutritional  B12/folate within normal limits  Decreased  iron 26, 8% percent saturation  Consider oral supplementation upon resolution of ileus    Chronic neck and back pain  Taking norco 5/325 prn    Quality Measures:  Alcantar Catheter: remove today  DVT Prophylaxis: lovenox  Ulcer Prophylaxis: not indicated  Antibiotics: none  Lines: piv  ======================================================

## 2017-02-04 NOTE — DISCHARGE PLANNING
Medical Social Work    SW received call from pt's neighbor stating pt has been repeatedly called her stating she is discharging and wanted to make SW aware. [place] placed call back to pt's nurse to provide info. Pt is not medically clear to d/c.

## 2017-02-04 NOTE — PROGRESS NOTES
Received a call from Dr. Mullins. Orders received that if next NA+ level is 128 or greater to stop the NS infusion. Orders read back to MD and implemented. Also discussed that this RN was told by SICU RN during transfer at 0400 that a Psych MD will be seeing patient when she is extubated since her neighbor says that she talks about suicide. Questioned whether patient needs to be placed on a Legal 2000 since she is extubated. Per MD if patient is not actively talking about suicide, Legal 2000 is not necessary. SAD score to be completed by this RN.

## 2017-02-04 NOTE — THERAPY
Orders received for CSE.  Per RN, pt has been eating regular/thin diet with no difficulty or s/sx.  RN to cancel orders.

## 2017-02-04 NOTE — PROGRESS NOTES
"RN responded to IV pumps alarming a \"downstream occulusion\" alarm. The central line was half way pulled out and patient was scratching at her neck saying how much her neck hurt. When asked why she pulled out her central line, patient responded with \"it was really bothering me.\" RN explained to patient why she had the central line and that she needs to ask for help if something is bothering her. It was explained to her that restraints need to go back on for her own safety so that she does not pull out any other equipment or lines. Patient became very angry and noncompliant. Education reinforced.    Dr. Mullins notified that patient pulled out her central line and that restraints have been reinstated. Per discussion patient does not need central line at this point. Order received for restraints.   "

## 2017-02-04 NOTE — DISCHARGE PLANNING
Medical Social Work    SW received page regarding questions about patient. SW placed call back to pt's nurse. However, they were unaware of page. SW will continue to follow.

## 2017-02-05 ENCOUNTER — APPOINTMENT (OUTPATIENT)
Dept: RADIOLOGY | Facility: MEDICAL CENTER | Age: 62
DRG: 101 | End: 2017-02-05
Attending: INTERNAL MEDICINE
Payer: MEDICAID

## 2017-02-05 LAB
ALBUMIN SERPL BCP-MCNC: 3.3 G/DL (ref 3.2–4.9)
ALBUMIN/GLOB SERPL: 1.2 G/DL
ALP SERPL-CCNC: 67 U/L (ref 30–99)
ALT SERPL-CCNC: 23 U/L (ref 2–50)
ANION GAP SERPL CALC-SCNC: 13 MMOL/L (ref 0–11.9)
AST SERPL-CCNC: 29 U/L (ref 12–45)
BASOPHILS # BLD AUTO: 0.1 % (ref 0–1.8)
BASOPHILS # BLD: 0.01 K/UL (ref 0–0.12)
BILIRUB SERPL-MCNC: 0.5 MG/DL (ref 0.1–1.5)
BUN SERPL-MCNC: 4 MG/DL (ref 8–22)
CALCIUM SERPL-MCNC: 8.9 MG/DL (ref 8.5–10.5)
CHLORIDE SERPL-SCNC: 96 MMOL/L (ref 96–112)
CO2 SERPL-SCNC: 20 MMOL/L (ref 20–33)
CREAT SERPL-MCNC: 0.24 MG/DL (ref 0.5–1.4)
EOSINOPHIL # BLD AUTO: 0.01 K/UL (ref 0–0.51)
EOSINOPHIL NFR BLD: 0.1 % (ref 0–6.9)
ERYTHROCYTE [DISTWIDTH] IN BLOOD BY AUTOMATED COUNT: 48.3 FL (ref 35.9–50)
GFR SERPL CREATININE-BSD FRML MDRD: >60 ML/MIN/1.73 M 2
GLOBULIN SER CALC-MCNC: 2.7 G/DL (ref 1.9–3.5)
GLUCOSE SERPL-MCNC: 93 MG/DL (ref 65–99)
HCT VFR BLD AUTO: 34.4 % (ref 37–47)
HGB BLD-MCNC: 11.9 G/DL (ref 12–16)
IMM GRANULOCYTES # BLD AUTO: 0.02 K/UL (ref 0–0.11)
IMM GRANULOCYTES NFR BLD AUTO: 0.3 % (ref 0–0.9)
LYMPHOCYTES # BLD AUTO: 0.86 K/UL (ref 1–4.8)
LYMPHOCYTES NFR BLD: 11 % (ref 22–41)
MAGNESIUM SERPL-MCNC: 1.9 MG/DL (ref 1.5–2.5)
MCH RBC QN AUTO: 34.4 PG (ref 27–33)
MCHC RBC AUTO-ENTMCNC: 34.6 G/DL (ref 33.6–35)
MCV RBC AUTO: 99.4 FL (ref 81.4–97.8)
MONOCYTES # BLD AUTO: 0.85 K/UL (ref 0–0.85)
MONOCYTES NFR BLD AUTO: 10.9 % (ref 0–13.4)
NEUTROPHILS # BLD AUTO: 6.04 K/UL (ref 2–7.15)
NEUTROPHILS NFR BLD: 77.6 % (ref 44–72)
NRBC # BLD AUTO: 0 K/UL
NRBC BLD AUTO-RTO: 0 /100 WBC
PLATELET # BLD AUTO: 182 K/UL (ref 164–446)
PMV BLD AUTO: 9.4 FL (ref 9–12.9)
POTASSIUM SERPL-SCNC: 3.5 MMOL/L (ref 3.6–5.5)
PROT SERPL-MCNC: 6 G/DL (ref 6–8.2)
RBC # BLD AUTO: 3.46 M/UL (ref 4.2–5.4)
SODIUM SERPL-SCNC: 129 MMOL/L (ref 135–145)
WBC # BLD AUTO: 7.8 K/UL (ref 4.8–10.8)

## 2017-02-05 PROCEDURE — 83735 ASSAY OF MAGNESIUM: CPT

## 2017-02-05 PROCEDURE — A9270 NON-COVERED ITEM OR SERVICE: HCPCS | Performed by: INTERNAL MEDICINE

## 2017-02-05 PROCEDURE — 85025 COMPLETE CBC W/AUTO DIFF WBC: CPT

## 2017-02-05 PROCEDURE — 99233 SBSQ HOSP IP/OBS HIGH 50: CPT | Mod: GC | Performed by: INTERNAL MEDICINE

## 2017-02-05 PROCEDURE — 700102 HCHG RX REV CODE 250 W/ 637 OVERRIDE(OP): Performed by: INTERNAL MEDICINE

## 2017-02-05 PROCEDURE — 80053 COMPREHEN METABOLIC PANEL: CPT

## 2017-02-05 PROCEDURE — 770006 HCHG ROOM/CARE - MED/SURG/GYN SEMI*

## 2017-02-05 PROCEDURE — 71010 DX-CHEST-PORTABLE (1 VIEW): CPT

## 2017-02-05 PROCEDURE — 700102 HCHG RX REV CODE 250 W/ 637 OVERRIDE(OP): Performed by: HOSPITALIST

## 2017-02-05 PROCEDURE — A9270 NON-COVERED ITEM OR SERVICE: HCPCS | Performed by: HOSPITALIST

## 2017-02-05 RX ORDER — TEMAZEPAM 15 MG/1
15 CAPSULE ORAL NIGHTLY PRN
Status: DISCONTINUED | OUTPATIENT
Start: 2017-02-05 | End: 2017-02-05

## 2017-02-05 RX ORDER — TEMAZEPAM 15 MG/1
15 CAPSULE ORAL
Status: DISCONTINUED | OUTPATIENT
Start: 2017-02-05 | End: 2017-02-07

## 2017-02-05 RX ADMIN — HYDROCODONE BITARTRATE AND ACETAMINOPHEN 1 TABLET: 5; 325 TABLET ORAL at 17:01

## 2017-02-05 RX ADMIN — HYDROCODONE BITARTRATE AND ACETAMINOPHEN 1 TABLET: 5; 325 TABLET ORAL at 21:29

## 2017-02-05 RX ADMIN — TEMAZEPAM 15 MG: 15 CAPSULE ORAL at 21:26

## 2017-02-05 RX ADMIN — HYDROCODONE BITARTRATE AND ACETAMINOPHEN 1 TABLET: 5; 325 TABLET ORAL at 12:45

## 2017-02-05 RX ADMIN — HYDROCODONE BITARTRATE AND ACETAMINOPHEN 1 TABLET: 5; 325 TABLET ORAL at 04:21

## 2017-02-05 RX ADMIN — POTASSIUM CHLORIDE 40 MEQ: 1500 TABLET, EXTENDED RELEASE ORAL at 20:21

## 2017-02-05 RX ADMIN — HYDROCODONE BITARTRATE AND ACETAMINOPHEN 1 TABLET: 5; 325 TABLET ORAL at 08:26

## 2017-02-05 RX ADMIN — HYDROCODONE BITARTRATE AND ACETAMINOPHEN 1 TABLET: 5; 325 TABLET ORAL at 00:21

## 2017-02-05 ASSESSMENT — PAIN SCALES - GENERAL
PAINLEVEL_OUTOF10: 7
PAINLEVEL_OUTOF10: 5
PAINLEVEL_OUTOF10: 9
PAINLEVEL_OUTOF10: 6
PAINLEVEL_OUTOF10: 5
PAINLEVEL_OUTOF10: 4
PAINLEVEL_OUTOF10: 8
PAINLEVEL_OUTOF10: 10
PAINLEVEL_OUTOF10: 7
PAINLEVEL_OUTOF10: 8
PAINLEVEL_OUTOF10: 7
PAINLEVEL_OUTOF10: 5
PAINLEVEL_OUTOF10: 8
PAINLEVEL_OUTOF10: 8
PAINLEVEL_OUTOF10: 6

## 2017-02-05 ASSESSMENT — ENCOUNTER SYMPTOMS
INSOMNIA: 1
ABDOMINAL PAIN: 1
NERVOUS/ANXIOUS: 1
PALPITATIONS: 0
CHILLS: 0
NAUSEA: 0
FEVER: 0
SENSORY CHANGE: 0
FOCAL WEAKNESS: 0
VOMITING: 0

## 2017-02-05 NOTE — CARE PLAN
Problem: Safety  Goal: Will remain free from injury  Outcome: PROGRESSING SLOWER THAN EXPECTED  PT pulled out LDAs during previous and was found pulling on lines during this shift   Intervention: Provide assistance with mobility  Attempted. Pt refused at times.   Intervention: Collaborate with Interdisciplinary Team for safe transfer and mobilization techniques  Completed. Bed alarm strip in place.   Intervention: Educate patient and significant other/support system about adaptive mobility strategies and safe transfers  Completed      Problem: Discharge Barriers/Planning  Goal: Patient’s continuum of care needs will be met  Outcome: PROGRESSING SLOWER THAN EXPECTED  Intervention: Assess potential discharge barriers on admission and throughout hospital stay  Pt is confused at times. Unknown whether patient can discharge home and meet all needs.   Intervention: Involve patient and significant other/support system in setting and prioritizing goals for hospital stay and discharge  Completed. Patient having difficulty understanding healthcare team's goals and their purpose for the patients POC  Intervention: Collaborate with Transitional Care Team and Interdisciplinary Team to meet discharge needs  Completed. Team working together in pts best interest.   Intervention: Explain discharge instructions and medication reconcilliation to patient and significant other/support system  n/a

## 2017-02-05 NOTE — PROGRESS NOTES
Pt refusing almost all care. Pt has agreed only to take her potassium and pain meds. She has refused to have her blood work completed and xray completed. She is refusing to be turned and to let this RN complete a full assessment.

## 2017-02-05 NOTE — CARE PLAN
Problem: Skin Integrity  Goal: Risk for impaired skin integrity will decrease  Outcome: PROGRESSING AS EXPECTED  Intervention: Assess risk factors for impaired skin integrity and/or pressure ulcers  Completed.   Intervention: Implement precautions to protect skin integrity in collaboration with the interdisciplinary team  Pillows in use for support and positioning. All LDAs strategically placed to decrease risk for skin breakdown. Heels being floated by pillow   Intervention: Assess and monitor skin integrity, appearance and/or temperature  Completed      Problem: Urinary Elimination:  Goal: Ability to reestablish a normal urinary elimination pattern will improve  Outcome: PROGRESSING AS EXPECTED  Intervention: Evaluate need to continue indwelling urinary catheter  Alcantar catheter removed  Intervention: Assess and monitor for signs and symptoms of urinary retention  Patient must void by 7pm this evening.   Intervention: Record post-void residual volumes  completed  Intervention: Implement bladder training program  Patient educated about bladder retraining.   Intervention: Encourage scheduled voiding  Completed

## 2017-02-05 NOTE — CARE PLAN
"Problem: Knowledge Deficit  Goal: Knowledge of disease process/condition, treatment plan, diagnostic tests, and medications will improve  Disease process explained to pt- needs reinforcement, pt does not demonstrate clear understanding. Believes \"she is in the hospital by accident, this is all a misundersting and she does not need to be here\". All pt's questions answered     Problem: Psychosocial Needs:  Goal: Level of anxiety will decrease  Pt very anxious and agitated, every effort made to meet pt 's needs. Education provided to pt regarding why she is here. Questions answered, nursing tasks explained to pt prior to performing         "

## 2017-02-05 NOTE — PROGRESS NOTES
Late entry:    At 1415 pt was transported to [facility] by transport team via wheelchair. Pt left with all belongings, chart, and meds.

## 2017-02-05 NOTE — PROGRESS NOTES
[facility] Progress Note               Author: Fili Kudatyvirginiainga Date & Time created: 2/5/2017  10:43 AM     Interval History:  Pt upset that her benzo's are not being given as she has insomnia. She keeps stating that she had seizures, she is fine now and she just wants to go home. She denied any hx of cancer.      Review of Systems   Constitutional: Negative for fever and chills.   Cardiovascular: Negative for chest pain and palpitations.   Gastrointestinal: Positive for abdominal pain. Negative for nausea and vomiting.   Skin: Negative for itching and rash.   Neurological: Negative for sensory change and focal weakness.   Psychiatric/Behavioral: The patient is nervous/anxious and has insomnia.          Physical Exam   Constitutional: She is oriented to person, place, and time. No distress.   Eyes: Conjunctivae and EOM are normal.   Cardiovascular: Normal rate, regular rhythm and normal heart sounds.    Pulmonary/Chest: Effort normal and breath sounds normal.   Abdominal: Soft. Bowel sounds are normal. She exhibits no distension. There is no tenderness.   Neurological: She is alert and oriented to person, place, and time.   Skin: Skin is warm and dry. She is not diaphoretic.   Psychiatric:   Anxious, and upset that benzo's are not being given.       Labs:  Recent Results (from the past 24 hour(s))   CBC WITH DIFFERENTIAL    Collection Time: 02/05/17 10:16 AM   Result Value Ref Range    WBC 7.8 4.8 - 10.8 K/uL    RBC 3.46 (L) 4.20 - 5.40 M/uL    Hemoglobin 11.9 (L) 12.0 - 16.0 g/dL    Hematocrit 34.4 (L) 37.0 - 47.0 %    MCV 99.4 (H) 81.4 - 97.8 fL    MCH 34.4 (H) 27.0 - 33.0 pg    MCHC 34.6 33.6 - 35.0 g/dL    RDW 48.3 35.9 - 50.0 fL    Platelet Count 182 164 - 446 K/uL    MPV 9.4 9.0 - 12.9 fL    Neutrophils-Polys 77.60 (H) 44.00 - 72.00 %    Lymphocytes 11.00 (L) 22.00 - 41.00 %    Monocytes 10.90 0.00 - 13.40 %    Eosinophils 0.10 0.00 - 6.90 %    Basophils 0.10 0.00 - 1.80 %    Immature Granulocytes 0.30 0.00 -  0.90 %    Nucleated RBC 0.00 /100 WBC    Neutrophils (Absolute) 6.04 2.00 - 7.15 K/uL    Lymphs (Absolute) 0.86 (L) 1.00 - 4.80 K/uL    Monos (Absolute) 0.85 0.00 - 0.85 K/uL    Eos (Absolute) 0.01 0.00 - 0.51 K/uL    Baso (Absolute) 0.01 0.00 - 0.12 K/uL    Immature Granulocytes (abs) 0.02 0.00 - 0.11 K/uL    NRBC (Absolute) 0.00 K/uL       Hemodynamics:  Temp (24hrs), Av.6 °C (99.6 °F), Min:37.1 °C (98.8 °F), Max:37.8 °C (100 °F)  Temperature: 37.1 °C (98.8 °F), Monitored Temp: 37.1 °C (98.8 °F)  Pulse  Av.2  Min: 50  Max: 134Heart Rate (Monitored): 90  NIBP: 124/90 mmHg    Respiratory:    Respiration: 14, Pulse Oximetry: 94 %        RUL Breath Sounds: Clear, RML Breath Sounds: Clear, RLL Breath Sounds: Diminished, JOSEPHINE Breath Sounds: Clear, LLL Breath Sounds: Diminished  Fluids:    Intake/Output Summary (Last 24 hours) at 17 1043  Last data filed at 17 0800   Gross per 24 hour   Intake   2875 ml   Output   2325 ml   Net    550 ml        GI/Nutrition:  Orders Placed This Encounter   Procedures   • DIET ORDER     Standing Status: Standing      Number of Occurrences: 1      Standing Expiration Date:      Order Specific Question:  Diet:     Answer:  Regular [1]     Order Specific Question:  Miscellaneous modifications:     Answer:  Vegetarian [13]     Medications:  Current Facility-Administered Medications   Medication Last Dose   • hydrocodone-acetaminophen (NORCO) 5-325 MG per tablet 1 Tab 1 Tab at 17 0826   • potassium chloride SA (Kdur) tablet 40 mEq 40 mEq at 17 2019   • NS infusion     • acetaminophen (TYLENOL) tablet 500 mg 500 mg at 17 1602   • Respiratory Care per Protocol     • lorazepam (ATIVAN) injection 1 mg      Or   • lorazepam (ATIVAN) injection 2 mg      Or   • lorazepam (ATIVAN) injection 3 mg      Or   • lorazepam (ATIVAN) injection 4 mg      Or   • lorazepam (ATIVAN) injection 5 mg     • docusate sodium (COLACE) capsule 100 mg Stopped at 17    And    • senna-docusate (PERICOLACE or SENOKOT S) 8.6-50 MG per tablet 1 Tab 1 Tab at 02/04/17 2019    And   • senna-docusate (PERICOLACE or SENOKOT S) 8.6-50 MG per tablet 1 Tab      And   • lactulose 20 GM/30ML solution 30 mL      And   • bisacodyl (DULCOLAX) suppository 10 mg      And   • fleet enema 133 mL     • enoxaparin (LOVENOX) inj 30 mg 30 mg at 02/04/17 0831     Medical Decision Making, by Problem:    Suicide ideation/unspecified mood disorder  Based on conversation with  and patient's neighbor by team is that patient is a hoarder.  Floor covered in animal and human feces. Carpet soaked in urine. Per neighbor patient talks about suicide all the time, neighbor's  has removed all firearms from patient's home.    Has denied any SI/HI here.  Psychiatry on board.  Transfer to medical floor for further disposition. (will need PT/OT if patient willing to participate and likely SNF).    Altered mentation  Tonic-clonic seizures  Hyponatremia  secondary to serum Na+108 on admission  Last Na = 126, 2/3/2017  Cont NS (refusing lab draws at this time)    Ileus  abd x-ray showed large amount of gas and small bowel, possible ileus  Patient is passing gas  Close clinical monitoring    Normocytic anemia  FOBT positive for moderate blood.  B12/folate within normal limits  Decreased iron 26, 8% percent saturation  Consider colonoscopy as outpatient and oral supplementation upon resolution of ileus.    Chronic neck and back pain  Taking norco 5/325 prn.    Quality  Measures:    Alcantar catheter: Critically Ill - Requiring Accurate Measurement of Urinary Output      DVT Prophylaxis: Enoxaparin (Lovenox)    Ulcer prophylaxis: Yes

## 2017-02-05 NOTE — PROGRESS NOTES
Orders received to transfer patient to [facility] 186/1. Report given to JONN Espinoza. All questions answered. Pt transport called.

## 2017-02-05 NOTE — PSYCHIATRY
"PSYCHIATRIC CONSULTATION:  Reason for admission:  AMS with seizures  Reason for consult: SI  Requesting Physician: Dr. Jose Urias  Psychiatric Supervising Attending: Dr. MARITA Jarquin  Legal Hold Status:        Voluntary  Guardianship: self         Chief Complaint:   \" I had a seizure, but I want to go home\"    HPI:  Pt is a 62yo white female who was consulted for SI by the medical team.  Pt explains she had her first seizures then her neighbor called the ambulance.  Pt denies a hx of seizures or treatment for them.  Pt exclaims she would like to go home and her \"stupid nurse\" won't come pick her up.  When asked about self-harm, SI or plans to harm self, pt  denies.  She denies ever attempting suicide as well.  Pt explains she has been on Restoril for about 25yrs for help with sleep 45mg (3x 15mg tabs) during the night.  Pt also admits to using Norco for about 25yrs due to severe scoliosis.      Psychiatric Review of Current Symptoms:  Depression: distrurbed sleep aided with Restoril.  Denies other criteria of depression  Denies anxiety  Denies Trish  Denies psychotic features  Pt appears irritable, displays splitting, and displays over dramization of situations      MSE:  Vitals:  Filed Vitals:    02/04/17 1500 02/04/17 1600 02/04/17 1700 02/04/17 1800   BP:       Pulse:   50 58   Temp:  37.6 °C (99.7 °F)  37.8 °C (100 °F)   Resp: 12 12 13 12   Height:       Weight:       SpO2:  95% 86% 94%     Musculoskeletal: appears contorted in bed, hands have motor deficits  Appearance: sitting up in bed, mod grooming, no acute distress, appropriate eye contact  Thought Process: appears linear, goal-oriented, not pressured  Abnormal Thoughts/Psychosis: denies AVH, paranoia  Associations:  Appears organized  Speech: WNL  Language: english  Mood/Affect: \"Fine, I want to go\"   /// appears irritable, tense at times  SI/HI: denies thoughts, plan or intent to harm self or others  Attention: alert  Memory: no appreciable " deficits  Orientation: x 4  Fund of Knowledge: adequate  Insight and Judgment: fair/fair  Neurological Testing: concrete, attention +, recall 3/3, clock was oddly shaped but otherwise normal given the hand motor deficits    Past Psych Hx:  Denies Dx  Past meds are Restoril and Norco  Denies hospitalization  Denies SA or self-harm    Family Psych Hx:  Denies    Social Hx:  B/R in [place].  Grew up with M/D and was an only child.  Father was often traveling but around on the weekends.  Notes getting along well with parents.  Denies abuses, felt safe and supported.  +HS  +some college in [place], studied socialogy   Never , no kids  Lives in parents house.  Noted her mother passed away last sept.    **Pt states she gets income from stock dividends, which is NOT consistent with  note**  ** work notes concern for ability to care for self**    Drugs/Alcohol/Tobacco Hx:  Tob: never  Alcohol: denies  Subs: never     Medical Hx:as documented. All the vitals, labs, notes, records, and the MAR. Findings of interest to psychiatry include:            Medical Conditions: neck pain, scoliosis, insomnia  Surgical Hx:  Tubal ligation  Allergies: codeine and tramadol  Medications: (current):  Current facility-administered medications:   •  hydrocodone-acetaminophen (NORCO) 5-325 MG per tablet 1 Tab, 1 Tab, Oral, Q4HRS PRN, Jose Urias M.D., 1 Tab at 02/04/17 1324  •  potassium chloride SA (Kdur) tablet 40 mEq, 40 mEq, Oral, BID, Marlon Mai M.D., 40 mEq at 02/04/17 1130  •  NS infusion, , Intravenous, Continuous, Fili Morrissey M.D., Last Rate: 50 mL/hr at 02/04/17 0153  •  acetaminophen (TYLENOL) tablet 500 mg, 500 mg, Oral, Q6HRS PRN, Marlon Mai M.D., 500 mg at 02/03/17 1602  •  Respiratory Care per Protocol, , Nebulization, Continuous RT, Eddie Garcia M.D.  •  lorazepam (ATIVAN) injection 1 mg, 1 mg, Intravenous, Q HOUR PRN **OR** lorazepam (ATIVAN) injection 2 mg, 2 mg, Intravenous, Q HOUR PRN **OR**  lorazepam (ATIVAN) injection 3 mg, 3 mg, Intravenous, Q HOUR PRN **OR** lorazepam (ATIVAN) injection 4 mg, 4 mg, Intravenous, Q HOUR PRN **OR** lorazepam (ATIVAN) injection 5 mg, 5 mg, Intravenous, Q HOUR PRN, Fausto Davila M.D.  •  docusate sodium (COLACE) capsule 100 mg, 100 mg, Oral, QAM, Stopped at 02/01/17 2115 **AND** senna-docusate (PERICOLACE or SENOKOT S) 8.6-50 MG per tablet 1 Tab, 1 Tab, Oral, Nightly, Stopped at 02/03/17 1958 **AND** senna-docusate (PERICOLACE or SENOKOT S) 8.6-50 MG per tablet 1 Tab, 1 Tab, Oral, Q24HRS PRN **AND** lactulose 20 GM/30ML solution 30 mL, 30 mL, Oral, Q24HRS PRN **AND** bisacodyl (DULCOLAX) suppository 10 mg, 10 mg, Rectal, Q24HRS PRN **AND** fleet enema 133 mL, 1 Each, Rectal, Once PRN, Fili Morrissey M.D.  •  enoxaparin (LOVENOX) inj 30 mg, 30 mg, Subcutaneous, DAILY, Fili Morrissey M.D., 30 mg at 02/04/17 0831      Medical Review of Symptoms: (as reported by the patient). All systems reviewed. Those not listed below were found to be negative.     Neurological: recent seizures  Musculoskeletal:  Chronic pain, scoliosis        Assessment:  Psychiatric:     #R/O Personality traits  Pt does not appear to be exhibiting suicidal thoughts, plans or intent.  She expresses her desire to get home, but denies ever considering SI.  Pt does not currently meet criteria for depression or anxiety.  She does display traits of irritability, easy to anger/frustrate, splitting and over dramatization.      There is concern for ability to care for herself, especially given the notes from the SW about the living conditions.  SW should f/u.    Medical: as noted by the medical treatment team.    Plan:  Legal hold: none  Records reviewed: yes     no    Medications: no additional psych meds  Orders: -SW to f/u on ability to care for self  Collateral: obtained with permission  Will Follow         Thank you for the Consult

## 2017-02-06 ENCOUNTER — APPOINTMENT (OUTPATIENT)
Dept: RADIOLOGY | Facility: MEDICAL CENTER | Age: 62
DRG: 101 | End: 2017-02-06
Attending: INTERNAL MEDICINE
Payer: MEDICAID

## 2017-02-06 PROBLEM — G89.29 CHRONIC LOW BACK PAIN: Status: ACTIVE | Noted: 2017-02-06

## 2017-02-06 PROBLEM — D64.9 NORMOCYTIC ANEMIA: Status: ACTIVE | Noted: 2017-02-06

## 2017-02-06 PROBLEM — F51.05 INSOMNIA SECONDARY TO DEPRESSION WITH ANXIETY: Status: ACTIVE | Noted: 2017-02-06

## 2017-02-06 PROBLEM — E87.1 HYPONATREMIA: Status: ACTIVE | Noted: 2017-02-06

## 2017-02-06 PROBLEM — F41.8 INSOMNIA SECONDARY TO DEPRESSION WITH ANXIETY: Status: ACTIVE | Noted: 2017-02-06

## 2017-02-06 PROBLEM — M54.50 CHRONIC LOW BACK PAIN: Status: ACTIVE | Noted: 2017-02-06

## 2017-02-06 PROCEDURE — 700102 HCHG RX REV CODE 250 W/ 637 OVERRIDE(OP): Performed by: HOSPITALIST

## 2017-02-06 PROCEDURE — 700112 HCHG RX REV CODE 229: Performed by: HOSPITALIST

## 2017-02-06 PROCEDURE — A9270 NON-COVERED ITEM OR SERVICE: HCPCS | Performed by: INTERNAL MEDICINE

## 2017-02-06 PROCEDURE — 700102 HCHG RX REV CODE 250 W/ 637 OVERRIDE(OP): Performed by: INTERNAL MEDICINE

## 2017-02-06 PROCEDURE — 700111 HCHG RX REV CODE 636 W/ 250 OVERRIDE (IP): Performed by: HOSPITALIST

## 2017-02-06 PROCEDURE — A9270 NON-COVERED ITEM OR SERVICE: HCPCS | Performed by: HOSPITALIST

## 2017-02-06 PROCEDURE — 770006 HCHG ROOM/CARE - MED/SURG/GYN SEMI*

## 2017-02-06 PROCEDURE — 99232 SBSQ HOSP IP/OBS MODERATE 35: CPT | Mod: GC | Performed by: INTERNAL MEDICINE

## 2017-02-06 RX ORDER — TEMAZEPAM 15 MG/1
15 CAPSULE ORAL ONCE
Status: COMPLETED | OUTPATIENT
Start: 2017-02-06 | End: 2017-02-06

## 2017-02-06 RX ADMIN — HYDROCODONE BITARTRATE AND ACETAMINOPHEN 1 TABLET: 5; 325 TABLET ORAL at 19:35

## 2017-02-06 RX ADMIN — HYDROCODONE BITARTRATE AND ACETAMINOPHEN 1 TABLET: 5; 325 TABLET ORAL at 15:23

## 2017-02-06 RX ADMIN — POTASSIUM CHLORIDE 40 MEQ: 1500 TABLET, EXTENDED RELEASE ORAL at 11:16

## 2017-02-06 RX ADMIN — TEMAZEPAM 15 MG: 15 CAPSULE ORAL at 19:34

## 2017-02-06 RX ADMIN — ENOXAPARIN SODIUM 30 MG: 100 INJECTION SUBCUTANEOUS at 11:16

## 2017-02-06 RX ADMIN — HYDROCODONE BITARTRATE AND ACETAMINOPHEN 1 TABLET: 5; 325 TABLET ORAL at 11:16

## 2017-02-06 RX ADMIN — DOCUSATE SODIUM 100 MG: 100 CAPSULE ORAL at 11:16

## 2017-02-06 RX ADMIN — TEMAZEPAM 15 MG: 15 CAPSULE ORAL at 11:21

## 2017-02-06 RX ADMIN — HYDROCODONE BITARTRATE AND ACETAMINOPHEN 1 TABLET: 5; 325 TABLET ORAL at 06:41

## 2017-02-06 RX ADMIN — STANDARDIZED SENNA CONCENTRATE AND DOCUSATE SODIUM 1 TABLET: 8.6; 5 TABLET, FILM COATED ORAL at 19:34

## 2017-02-06 RX ADMIN — POTASSIUM CHLORIDE 40 MEQ: 1500 TABLET, EXTENDED RELEASE ORAL at 19:34

## 2017-02-06 RX ADMIN — HYDROCODONE BITARTRATE AND ACETAMINOPHEN 1 TABLET: 5; 325 TABLET ORAL at 02:38

## 2017-02-06 ASSESSMENT — PAIN SCALES - GENERAL
PAINLEVEL_OUTOF10: 10
PAINLEVEL_OUTOF10: 7
PAINLEVEL_OUTOF10: 3
PAINLEVEL_OUTOF10: 7
PAINLEVEL_OUTOF10: 0
PAINLEVEL_OUTOF10: 8

## 2017-02-06 ASSESSMENT — ENCOUNTER SYMPTOMS
HALLUCINATIONS: 0
ABDOMINAL PAIN: 0
HEADACHES: 0
HEMOPTYSIS: 0
DIAPHORESIS: 0
SORE THROAT: 0
SPUTUM PRODUCTION: 0
CHILLS: 0
DIZZINESS: 0
NECK PAIN: 0
DIARRHEA: 0
SEIZURES: 0
PALPITATIONS: 0
FLANK PAIN: 0
INSOMNIA: 1
ORTHOPNEA: 0
BRUISES/BLEEDS EASILY: 0
VOMITING: 0
LOSS OF CONSCIOUSNESS: 0
MYALGIAS: 1
HEARTBURN: 0
DEPRESSION: 0
CONSTIPATION: 0
SHORTNESS OF BREATH: 0
BACK PAIN: 1
NERVOUS/ANXIOUS: 0
FALLS: 0
FOCAL WEAKNESS: 0
BLURRED VISION: 0
COUGH: 0
WEAKNESS: 1
WEIGHT LOSS: 0
NAUSEA: 1
TINGLING: 0
FEVER: 0

## 2017-02-06 ASSESSMENT — PAIN SCALES - WONG BAKER
WONGBAKER_NUMERICALRESPONSE: HURTS JUST A LITTLE BIT
WONGBAKER_NUMERICALRESPONSE: HURTS JUST A LITTLE BIT

## 2017-02-06 ASSESSMENT — LIFESTYLE VARIABLES: SUBSTANCE_ABUSE: 0

## 2017-02-06 NOTE — PROGRESS NOTES
TRANSFER NOTE:    Ms Chacon is a 61 year female with pmhx significant for insomnia, scoliosis, back & neck pain was brought to ER by MATTEO after neighbors called for AMS. She had tonic-clonic seizures while in ER, with a Na 108 and was treated with 3%  cc bolus following which her Na went up to 117. CT head at the time of admission was negative, and utox was positive for opioids & cannabinoids. She had to be intubated (2/2/17) and sedated for airway management as she continued to seize. Patient's seizures were treated with ativan and she was on propofol for sedation. Patient's seizures were most likely secondary to severe hyponatremia, 0.9% NS was used to correct Na @ 50cc an hour with q4 hrs bmp. Given that her Na corrected too fast to 122 on Day 2, D5W and DDAVP had to be given following which Na was slowly corrected with just 0.9% NS. Pt was extubated on 2/3/17. Following extubation patient was thought to be ? Suicidal with easy to anger/frustrate, irritability and was refusing care. Psychiatry evaluated patient and diagnosed her with personality traits but no SI. Regarding her living situation - per neighbor patient is a hoarder, her floor is covered with animal and human feces. Has privately hired caregiver (Gonzalo) and Neighbor Viv. She is non ambulatory and wheel chair bound. SW following patient (Elder protective service is warranted).    Things to follow up -  1. Unsure why patient is wheelchair bound (Need PT/OT eval).  2. Patient is on Benzo's for insomnia (Per patient this is provided by her PCP, has to evaluated at the time of disposition). On opioids for back pain.  3. Follow up with SW on further disposition.   4. At the time of admission there was suspicion for underlying cancer as neighbor reported that she is being treated for cancer and back pain. Although patient denies this further records from her PCP has to be obtained.

## 2017-02-06 NOTE — PROGRESS NOTES
"[facility] INTERNAL MEDICINE ATTENDING NOTE:   Betsy Gomez MD    Date & Time note created:   2/6/2017   3:28 PM     Visit Time:   Attending/resident bedside rounds 9-11:30 AM    The patient was evaluated with the resident staff.  I reviewed the resident's note and agree with the resident's findings and plan as documented in the resident's note except as documented in the attending note. Please reference resident daily note for complete information.    The chart was reviewed and summarized.  Available labs, imaging, O2 sats ,  EKGs were reviewed. Available nursing, consultant, and resident notes were reviewed. I am actively involved in the patient's care.     Additional attending notes:                                                                          61F with chronic back pain admitted with seizure/AMS requiring intubation and found to have severe hypoNa, believed 2/2 dehydration and general failure to thrive. She was extubated and successfully transferred to the general medicine floor last night, and has been stable since then. She is on IVF and her Na is around 129. She endorses ongoing weakness, which she has had for \"a very long time\". Will dc IVF and monitor Na off fluids. Also needs PT/OT evaluation for dispo planning, including ?SNF vs home with home health. She was seen by psych yesterday and deemed to be competent to make medical decisions, so returning home, should she chose to do so, would be her decision. For now, will assess post-discharge needs and plan accordingly.     Betsy Gomez MD   R Hospitalist     "

## 2017-02-06 NOTE — DISCHARGE PLANNING
SW spoke with pt's physician who advised pt is requesting to speak with SW regarding her caregiver's not being able to care for her. SW to meet with pt.

## 2017-02-06 NOTE — PSYCHIATRY
PSYCHIATRIC FOLLOW UP: not seen however psych will be signing off per weekend psych team. If needed please reconsult.

## 2017-02-06 NOTE — DISCHARGE PLANNING
Message received form EPS supervisor Delmy De Anda [phone].   Requesting more information on current EPS report.  SW returned call and left his name and contact phone.

## 2017-02-07 LAB
ALBUMIN SERPL BCP-MCNC: 3.1 G/DL (ref 3.2–4.9)
ALBUMIN/GLOB SERPL: 1.1 G/DL
ALP SERPL-CCNC: 60 U/L (ref 30–99)
ALT SERPL-CCNC: 24 U/L (ref 2–50)
ANION GAP SERPL CALC-SCNC: 6 MMOL/L (ref 0–11.9)
AST SERPL-CCNC: 26 U/L (ref 12–45)
BILIRUB SERPL-MCNC: 0.4 MG/DL (ref 0.1–1.5)
BUN SERPL-MCNC: 4 MG/DL (ref 8–22)
CALCIUM SERPL-MCNC: 8.6 MG/DL (ref 8.5–10.5)
CHLORIDE SERPL-SCNC: 98 MMOL/L (ref 96–112)
CO2 SERPL-SCNC: 24 MMOL/L (ref 20–33)
CORTIS SERPL-MCNC: 18.4 UG/DL (ref 0–23)
CREAT SERPL-MCNC: 0.31 MG/DL (ref 0.5–1.4)
ERYTHROCYTE [DISTWIDTH] IN BLOOD BY AUTOMATED COUNT: 49.5 FL (ref 35.9–50)
GFR SERPL CREATININE-BSD FRML MDRD: >60 ML/MIN/1.73 M 2
GLOBULIN SER CALC-MCNC: 2.7 G/DL (ref 1.9–3.5)
GLUCOSE SERPL-MCNC: 112 MG/DL (ref 65–99)
HCT VFR BLD AUTO: 35.9 % (ref 37–47)
HGB BLD-MCNC: 12.7 G/DL (ref 12–16)
MCH RBC QN AUTO: 34.8 PG (ref 27–33)
MCHC RBC AUTO-ENTMCNC: 35.4 G/DL (ref 33.6–35)
MCV RBC AUTO: 98.4 FL (ref 81.4–97.8)
PLATELET # BLD AUTO: 242 K/UL (ref 164–446)
PMV BLD AUTO: 8.9 FL (ref 9–12.9)
POTASSIUM SERPL-SCNC: 3.7 MMOL/L (ref 3.6–5.5)
PROT SERPL-MCNC: 5.8 G/DL (ref 6–8.2)
RBC # BLD AUTO: 3.65 M/UL (ref 4.2–5.4)
SODIUM SERPL-SCNC: 128 MMOL/L (ref 135–145)
WBC # BLD AUTO: 7.7 K/UL (ref 4.8–10.8)

## 2017-02-07 PROCEDURE — 80053 COMPREHEN METABOLIC PANEL: CPT

## 2017-02-07 PROCEDURE — A9270 NON-COVERED ITEM OR SERVICE: HCPCS | Performed by: HOSPITALIST

## 2017-02-07 PROCEDURE — 97163 PT EVAL HIGH COMPLEX 45 MIN: CPT

## 2017-02-07 PROCEDURE — G8979 MOBILITY GOAL STATUS: HCPCS | Mod: CI

## 2017-02-07 PROCEDURE — 700102 HCHG RX REV CODE 250 W/ 637 OVERRIDE(OP): Performed by: HOSPITALIST

## 2017-02-07 PROCEDURE — 700111 HCHG RX REV CODE 636 W/ 250 OVERRIDE (IP): Performed by: HOSPITALIST

## 2017-02-07 PROCEDURE — A9270 NON-COVERED ITEM OR SERVICE: HCPCS | Performed by: INTERNAL MEDICINE

## 2017-02-07 PROCEDURE — 85027 COMPLETE CBC AUTOMATED: CPT

## 2017-02-07 PROCEDURE — 36415 COLL VENOUS BLD VENIPUNCTURE: CPT

## 2017-02-07 PROCEDURE — 97535 SELF CARE MNGMENT TRAINING: CPT

## 2017-02-07 PROCEDURE — 770006 HCHG ROOM/CARE - MED/SURG/GYN SEMI*

## 2017-02-07 PROCEDURE — 82533 TOTAL CORTISOL: CPT

## 2017-02-07 PROCEDURE — 700102 HCHG RX REV CODE 250 W/ 637 OVERRIDE(OP): Performed by: INTERNAL MEDICINE

## 2017-02-07 PROCEDURE — 99231 SBSQ HOSP IP/OBS SF/LOW 25: CPT | Mod: GC | Performed by: INTERNAL MEDICINE

## 2017-02-07 PROCEDURE — G8978 MOBILITY CURRENT STATUS: HCPCS | Mod: CL

## 2017-02-07 RX ORDER — TEMAZEPAM 15 MG/1
15 CAPSULE ORAL 2 TIMES DAILY
Status: DISCONTINUED | OUTPATIENT
Start: 2017-02-07 | End: 2017-02-08 | Stop reason: HOSPADM

## 2017-02-07 RX ADMIN — TEMAZEPAM 15 MG: 15 CAPSULE ORAL at 09:03

## 2017-02-07 RX ADMIN — HYDROCODONE BITARTRATE AND ACETAMINOPHEN 1 TABLET: 5; 325 TABLET ORAL at 13:05

## 2017-02-07 RX ADMIN — POTASSIUM CHLORIDE 40 MEQ: 1500 TABLET, EXTENDED RELEASE ORAL at 19:32

## 2017-02-07 RX ADMIN — HYDROCODONE BITARTRATE AND ACETAMINOPHEN 1 TABLET: 5; 325 TABLET ORAL at 21:27

## 2017-02-07 RX ADMIN — TEMAZEPAM 15 MG: 15 CAPSULE ORAL at 21:27

## 2017-02-07 RX ADMIN — STANDARDIZED SENNA CONCENTRATE AND DOCUSATE SODIUM 1 TABLET: 8.6; 5 TABLET, FILM COATED ORAL at 19:32

## 2017-02-07 RX ADMIN — HYDROCODONE BITARTRATE AND ACETAMINOPHEN 1 TABLET: 5; 325 TABLET ORAL at 09:03

## 2017-02-07 RX ADMIN — POTASSIUM CHLORIDE 40 MEQ: 1500 TABLET, EXTENDED RELEASE ORAL at 09:03

## 2017-02-07 RX ADMIN — HYDROCODONE BITARTRATE AND ACETAMINOPHEN 1 TABLET: 5; 325 TABLET ORAL at 00:18

## 2017-02-07 RX ADMIN — HYDROCODONE BITARTRATE AND ACETAMINOPHEN 1 TABLET: 5; 325 TABLET ORAL at 04:12

## 2017-02-07 RX ADMIN — HYDROCODONE BITARTRATE AND ACETAMINOPHEN 1 TABLET: 5; 325 TABLET ORAL at 17:40

## 2017-02-07 ASSESSMENT — ENCOUNTER SYMPTOMS
NERVOUS/ANXIOUS: 0
BLURRED VISION: 0
FOCAL WEAKNESS: 0
FLANK PAIN: 0
SHORTNESS OF BREATH: 0
DIAPHORESIS: 0
VOMITING: 0
NAUSEA: 1
HEARTBURN: 0
BRUISES/BLEEDS EASILY: 0
WEAKNESS: 1
SEIZURES: 0
SORE THROAT: 0
BACK PAIN: 1
MYALGIAS: 1
FEVER: 0
LOSS OF CONSCIOUSNESS: 0
PALPITATIONS: 0
SPUTUM PRODUCTION: 0
FALLS: 0
ABDOMINAL PAIN: 0
HEMOPTYSIS: 0
TINGLING: 0
NECK PAIN: 0
ORTHOPNEA: 0
INSOMNIA: 1
DIZZINESS: 0
HALLUCINATIONS: 0
CONSTIPATION: 0
DEPRESSION: 0
DIARRHEA: 0
HEADACHES: 0
WEIGHT LOSS: 0
COUGH: 0
CHILLS: 0

## 2017-02-07 ASSESSMENT — PAIN SCALES - GENERAL
PAINLEVEL_OUTOF10: 6
PAINLEVEL_OUTOF10: 10
PAINLEVEL_OUTOF10: 8
PAINLEVEL_OUTOF10: 8

## 2017-02-07 ASSESSMENT — GAIT ASSESSMENTS: GAIT LEVEL OF ASSIST: UNABLE TO PARTICIPATE

## 2017-02-07 ASSESSMENT — LIFESTYLE VARIABLES: SUBSTANCE_ABUSE: 0

## 2017-02-07 ASSESSMENT — PAIN SCALES - WONG BAKER: WONGBAKER_NUMERICALRESPONSE: HURTS EVEN MORE

## 2017-02-07 NOTE — PROGRESS NOTES
[facility] Internal Medicine Interval Note    Name Fabiola Chacon 1955   Age/Sex 61 y.o. female   MRN [id number]   Code Status FULL     After 5PM or if no immediate response to page, please call for cross-coverage  Attending/Team: /Wilian Call [phone] to page   1st Call - Day Intern (R1):   Randy 2nd Call - Day Sr. Resident (R2/R3):   Lubna         Chief complaint/ reason for interval visit (Primary Diagnosis)   Hyponatremia induced Tonic clonic seizure    ID: Patient is 61 yof with PMH of chronic back pain and insomina admitted with hyponatremia induced tonic clonic seizure and AMS requiring intubation. Extubated and transferred to floor with improvement of mentation.     Interval Problem Daily Status Update:  Patient transferred to floor overnight. Ever since she remained stable and denies any new events. She remained tearful and asking for temazepam and would like to go home. She feels weak and would like to talk to SW regarding her condition. Vitals remained stable. Current NA is 129. Will hold NS and monitor her Na off fluids. PT/OT placed. SW to help with discharge. Likely patient needs Home health or discharge to SNF.    Active Hospital Problems    Diagnosis   • Hyponatremia [E87.1]  - Under correction. Na is 129.   • Normocytic anemia [D64.9]  - H/H stable.   • Insomnia secondary to depression with anxiety [F41.8, F51.05]  - Stable. On temazepam.   • Chronic low back pain [M54.5, G89.29]  - Stable.    • Altered consciousness [R40.4]  - Mentation improved.        Review of Systems   Constitutional: Positive for malaise/fatigue. Negative for fever, chills, weight loss and diaphoresis.   HENT: Negative for congestion, hearing loss and sore throat.    Eyes: Negative for blurred vision.   Respiratory: Negative for cough, hemoptysis, sputum production and shortness of breath.    Cardiovascular: Negative for chest pain, palpitations, orthopnea and leg swelling.    Gastrointestinal: Positive for nausea. Negative for heartburn, vomiting, abdominal pain, diarrhea, constipation and melena.   Genitourinary: Negative for dysuria, urgency, frequency and flank pain.   Musculoskeletal: Positive for myalgias and back pain. Negative for joint pain, falls and neck pain.   Skin: Negative for rash.   Neurological: Positive for weakness. Negative for dizziness, tingling, focal weakness, seizures, loss of consciousness and headaches.   Endo/Heme/Allergies: Does not bruise/bleed easily.   Psychiatric/Behavioral: Negative for depression, suicidal ideas, hallucinations and substance abuse. The patient has insomnia. The patient is not nervous/anxious.        Consultants/Specialty  Psychiatry.  PMA    Disposition  Inpatient for continual management of hypoNa, PT/OT and disposition plan.    Quality Measures  Labs reviewed, Medications reviewed and Radiology images reviewed        DVT Prophylaxis: Enoxaparin (Lovenox)    Ulcer prophylaxis: No    Assessed for rehab: Patient was assess for and/or received rehabilitation services during this hospitalization          Physical Exam       Filed Vitals:    02/06/17 0400 02/06/17 0700 02/06/17 1116 02/06/17 1500   BP: 115/82 123/85 130/91 101/76   Pulse: 94 92 103 98   Temp: 36.8 °C (98.2 °F) 36.8 °C (98.2 °F) 36.8 °C (98.3 °F) 36.8 °C (98.2 °F)   Resp: 16 16 16 16   Height:       Weight:       SpO2: 97% 98% 99% 98%     Body mass index is 20.19 kg/(m^2).    Oxygen Therapy:  Pulse Oximetry: 98 %, O2 (LPM): 0, O2 Delivery: None (Room Air)    Physical Exam   Constitutional: She is oriented to person, place, and time. She appears cachectic. No distress.   HENT:   Head: Normocephalic and atraumatic.   Eyes: Conjunctivae are normal. Pupils are equal, round, and reactive to light. No scleral icterus.   Neck: Neck supple. No JVD present. No thyromegaly present.   Cardiovascular: Normal rate and normal heart sounds.  Exam reveals no gallop and no friction rub.     No murmur heard.  Pulmonary/Chest: Breath sounds normal. No respiratory distress. She has no wheezes. She has no rales.   Abdominal: Soft. Bowel sounds are normal. She exhibits no distension and no mass. There is no tenderness.   Musculoskeletal: Normal range of motion. She exhibits tenderness. She exhibits no edema.   Lymphadenopathy:     She has no cervical adenopathy.   Neurological: She is alert and oriented to person, place, and time. No cranial nerve deficit. She exhibits normal muscle tone.   Skin: Skin is warm. She is not diaphoretic. No erythema.   Psychiatric: Her affect is labile.         Lab Data Review:      2/6/2017  9:41 PM    Recent Labs      02/03/17   2305  02/04/17   0101  02/05/17   1016   SODIUM  126*   --   129*   POTASSIUM  3.5*  3.8  3.5*   CHLORIDE  96   --   96   CO2  24   --   20   BUN  4*   --   4*   CREATININE  0.24*   --   0.24*   MAGNESIUM   --    --   1.9   CALCIUM  8.2*   --   8.9       Recent Labs      02/03/17   2305  02/05/17   1016   ALTSGPT   --   23   ASTSGOT   --   29   ALKPHOSPHAT   --   67   TBILIRUBIN   --   0.5   GLUCOSE  112*  93       Recent Labs      02/05/17   1016   RBC  3.46*   HEMOGLOBIN  11.9*   HEMATOCRIT  34.4*   PLATELETCT  182       Recent Labs      02/05/17   1016   WBC  7.8   NEUTSPOLYS  77.60*   LYMPHOCYTES  11.00*   MONOCYTES  10.90   EOSINOPHILS  0.10   BASOPHILS  0.10   ASTSGOT  29   ALTSGPT  23   ALKPHOSPHAT  67   TBILIRUBIN  0.5           Assessment/Plan     Altered consciousness  Overview  Patient admitted with Altered mentation secondary to Hyponatremia induced Tonic-clonic seizures  Serum Na+108 on admission.  Admitted to ICU. Intubated for  2 days. S/p extubation 2/3.  Started on Na correction.    Assessment & Plan  - Mentation improved with correction of serum Na.  - No seizure episodes since admission.  - She is alert and oriented X 3.  - Na is 129 currently.  - Will discontinue NS.  - Will monitor mental function and serum Na.  - PT/OT eval and  treat.  - Continue seizure precautions.  - SW to help with placement.    Hyponatremia  Overview  - Admission na is 108, presented with Tonic-clonic seizures->AMS.  - Hx of tmers?!, SIADH??!.  - Corrected .      Assessment & Plan  - Current Na is 129.  - Remained asymptomatic.  - Will DC NS.  Will monitor.    Normocytic anemia  Overview  H/H on admission 10.5/29.  Iron Panel: Low Iron at 26, Low % sat.  B12 and Folic acid wnl.  FOBT positive.    Assessment & Plan  - H/H remained stable.  - No evidence of external bleeding.  - Will consider discharging on Fe sulphate.  - Outpatient colonoscopy.    Insomnia secondary to depression with anxiety  Overview  Not clear if patient has psychiatric history.  Has chronic insomnia, On temazepam 15mg TID.        Assessment & Plan  - Patient remained tearful and asking for temazepam.  - no SI/HI.  - Per Psych evaluation she does not currently meet criteria for depression or anxiety.  - Possible borderline PD?!  - Continue Temazepam at current dose.  - Will continue to monitor.     Chronic low back pain  Overview  On home medication Norco 5/325.    Assessment & Plan  - no acute issues.  - Continue home medications.

## 2017-02-07 NOTE — ASSESSMENT & PLAN NOTE
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  - Current Na is 128 off NS.  - Remained asymptomatic.  - Will monitor.

## 2017-02-07 NOTE — ASSESSMENT & PLAN NOTE
- H/H remained stable.  - No evidence of external bleeding.  - Will consider discharging on Fe sulphate.  - Outpatient colonoscopy.

## 2017-02-07 NOTE — DISCHARGE PLANNING
Able to speak with EPS supervisor Delmy Krueger [phone].  Provided additional information support existing referral.  Information will be sent to Central office for final Decision.

## 2017-02-07 NOTE — PROGRESS NOTES
"                               [facility] Internal Medicine Interval Note    Name Fabiola Chacon 1955   Age/Sex 61 y.o. female   MRN [id number]   Code Status FULL     After 5PM or if no immediate response to page, please call for cross-coverage  Attending/Team: /Wilian Call [phone] to page   1st Call - Day Intern (R1):   Randy 2nd Call - Day Sr. Resident (R2/R3):   Lubna         Chief complaint/ reason for interval visit (Primary Diagnosis)   Hyponatremia induced Tonic clonic seizure    ID: Patient is 61 yof with PMH of chronic back pain and insomina admitted with hyponatremia induced tonic clonic seizure and AMS requiring intubation. Extubated and transferred to floor with improvement of mentation.     Interval Problem Daily Status Update:  Patient seen and examined at bedside. No new events overnight. Her vitals remained stable. Met with her neighbor \"Kimmy\" who updated me about the patient condition. Based on Kimmy,  was chair bound for almost 3 years and was using her mother social security benefits to take care of her self. She had a caregiver who usually help with ADLs. She was also exhibiting Hypochondriatic behavior by complaining of cancer in her belly and not being able to walk due to weakness. Also added that the patient has splitting behavior for long time. And since her mother passed away last September she had no money and the caregiver decreased her visits intervals. She ws also drinking a lot lately and that's why she thinks she developed seizure( Beer Potomania). Her caregiver quit and took her Norco pills as well??!!. Clearly the patient can't take care of her self. Currently will continue POC with PT/OT pending placement. SW onboard. Likely patient will need SNF placement.      Active Hospital Problems    Diagnosis   • Hyponatremia [E87.1]  - Na stable at 128 off NS.   • Normocytic anemia [D64.9]  - H/H stable.   • Insomnia secondary to depression with anxiety [F41.8, " F51.05]  - Stable. On temazepam.   • Chronic low back pain [M54.5, G89.29]  - Stable.    • Altered consciousness [R40.4]  - Mentation improved.        Review of Systems   Constitutional: Positive for malaise/fatigue. Negative for fever, chills, weight loss and diaphoresis.   HENT: Negative for congestion, hearing loss and sore throat.    Eyes: Negative for blurred vision.   Respiratory: Negative for cough, hemoptysis, sputum production and shortness of breath.    Cardiovascular: Negative for chest pain, palpitations, orthopnea and leg swelling.   Gastrointestinal: Positive for nausea. Negative for heartburn, vomiting, abdominal pain, diarrhea, constipation and melena.   Genitourinary: Negative for dysuria, urgency, frequency and flank pain.   Musculoskeletal: Positive for myalgias and back pain. Negative for joint pain, falls and neck pain.   Skin: Negative for rash.   Neurological: Positive for weakness. Negative for dizziness, tingling, focal weakness, seizures, loss of consciousness and headaches.   Endo/Heme/Allergies: Does not bruise/bleed easily.   Psychiatric/Behavioral: Negative for depression, suicidal ideas, hallucinations and substance abuse. The patient has insomnia. The patient is not nervous/anxious.        Consultants/Specialty  Psychiatry.  PMA    Disposition  Inpatient for continual management of hypoNa, PT/OT and disposition plan.    Quality Measures  Labs reviewed, Medications reviewed and Radiology images reviewed        DVT Prophylaxis: Enoxaparin (Lovenox)    Ulcer prophylaxis: No    Assessed for rehab: Patient was assess for and/or received rehabilitation services during this hospitalization          Physical Exam       Filed Vitals:    02/06/17 2000 02/07/17 0000 02/07/17 0400 02/07/17 0755   BP: 114/86 115/90 122/87 123/92   Pulse: 85 89 84 84   Temp: 36.6 °C (97.9 °F) 36.6 °C (97.8 °F) 36.6 °C (97.8 °F) 36.6 °C (97.8 °F)   Resp: 16 16 16 16   Height:       Weight:       SpO2: 97% 96% 98%  97%     Body mass index is 20.19 kg/(m^2).    Oxygen Therapy:  Pulse Oximetry: 97 %, O2 (LPM): 0, O2 Delivery: None (Room Air)    Physical Exam   Constitutional: She is oriented to person, place, and time. She appears cachectic. No distress.   HENT:   Head: Normocephalic and atraumatic.   Eyes: Conjunctivae are normal. Pupils are equal, round, and reactive to light. No scleral icterus.   Neck: Neck supple. No JVD present. No thyromegaly present.   Cardiovascular: Normal rate and normal heart sounds.  Exam reveals no gallop and no friction rub.    No murmur heard.  Pulmonary/Chest: Breath sounds normal. No respiratory distress. She has no wheezes. She has no rales.   Abdominal: Soft. Bowel sounds are normal. She exhibits no distension and no mass. There is no tenderness.   Musculoskeletal: Normal range of motion. She exhibits tenderness. She exhibits no edema.   Lymphadenopathy:     She has no cervical adenopathy.   Neurological: She is alert and oriented to person, place, and time. No cranial nerve deficit. She exhibits normal muscle tone.   Skin: Skin is warm. She is not diaphoretic. No erythema.   Psychiatric: Her affect is labile.         Lab Data Review:      2/6/2017  9:41 PM    Recent Labs      02/05/17   1016  02/07/17   0407   SODIUM  129*  128*   POTASSIUM  3.5*  3.7   CHLORIDE  96  98   CO2  20  24   BUN  4*  4*   CREATININE  0.24*  0.31*   MAGNESIUM  1.9   --    CALCIUM  8.9  8.6       Recent Labs      02/05/17   1016  02/07/17   0407   ALTSGPT  23  24   ASTSGOT  29  26   ALKPHOSPHAT  67  60   TBILIRUBIN  0.5  0.4   GLUCOSE  93  112*       Recent Labs      02/05/17   1016  02/07/17   0407   RBC  3.46*  3.65*   HEMOGLOBIN  11.9*  12.7   HEMATOCRIT  34.4*  35.9*   PLATELETCT  182  242       Recent Labs      02/05/17   1016  02/07/17   0407   WBC  7.8  7.7   NEUTSPOLYS  77.60*   --    LYMPHOCYTES  11.00*   --    MONOCYTES  10.90   --    EOSINOPHILS  0.10   --    BASOPHILS  0.10   --    ASTSGOT  29  26    ALTSGPT  23  24   ALKPHOSPHAT  67  60   TBILIRUBIN  0.5  0.4           Assessment/Plan     Altered consciousness  Overview  Patient admitted with Altered mentation secondary to Hyponatremia induced Tonic-clonic seizures  Serum Na+108 on admission.  Admitted to ICU. Intubated for  2 days. S/p extubation 2/3.  Started on Na correction.    Assessment & Plan  - Mentation improved with correction of serum Na.  - No seizure episodes since admission.  - She is alert and oriented X 3.  - Na is 129 currently.  - Will discontinue NS.  - Will monitor mental function and serum Na.  - PT/OT eval and treat.  - Continue seizure precautions.  - SW to help with placement.    Hyponatremia  Overview  - Admission na is 108, presented with Tonic-clonic seizures->AMS.  - Hx of tmers?!, SIADH??!.  - Corrected .      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  - Current Na is 128 off NS.  - Remained asymptomatic.  - Will monitor.    Normocytic anemia  Overview  H/H on admission 10.5/29.  Iron Panel: Low Iron at 26, Low % sat.  B12 and Folic acid wnl.  FOBT positive.    Assessment & Plan  - H/H remained stable.  - No evidence of external bleeding.  - Will consider discharging on Fe sulphate.  - Outpatient colonoscopy.    Insomnia secondary to depression with anxiety  Overview  Not clear if patient has psychiatric history.  Has chronic insomnia, On temazepam 15mg TID.        Assessment & Plan  - Patient remained tearful and asking for temazepam.  - no SI/HI.  - Per Psych evaluation she does not currently meet criteria for depression or anxiety.  - Possible borderline PD?!  - Continue Temazepam at current dose.  - Will continue to monitor.     Chronic low back pain  Overview  On home medication Norco 5/325.    Assessment & Plan  - no acute issues.  - Continue home medications.

## 2017-02-07 NOTE — PROGRESS NOTES
Alert and oriented x 4.  Skin warm and dry with small skin tear on right buttocks, mepilex applied for protection.  C/O stomach ache and asked for pain medications every 4 hours.  Rest very little.

## 2017-02-07 NOTE — ASSESSMENT & PLAN NOTE
- Mentation improved with correction of serum Na.  - No seizure episodes since admission.  - She is alert and oriented X 3.  - Na is 129 currently.  - Will discontinue NS.  - Will monitor mental function and serum Na.  - PT/OT eval and treat.  - Continue seizure precautions.  - SW to help with placement.

## 2017-02-07 NOTE — ASSESSMENT & PLAN NOTE
- Patient remained tearful and asking for temazepam.  - no SI/HI.  - Per Psych evaluation she does not currently meet criteria for depression or anxiety.  - Possible borderline PD?!  - Continue Temazepam at current dose.  - Will continue to monitor.

## 2017-02-08 ENCOUNTER — APPOINTMENT (OUTPATIENT)
Dept: RADIOLOGY | Facility: MEDICAL CENTER | Age: 62
End: 2017-02-08
Attending: FAMILY MEDICINE
Payer: MEDICAID

## 2017-02-08 ENCOUNTER — RESOLUTE PROFESSIONAL BILLING HOSPITAL PROF FEE (OUTPATIENT)
Dept: HOSPITALIST | Facility: MEDICAL CENTER | Age: 62
End: 2017-02-08
Payer: COMMERCIAL

## 2017-02-08 ENCOUNTER — HOSPITAL ENCOUNTER (OUTPATIENT)
Facility: MEDICAL CENTER | Age: 62
End: 2017-02-17
Attending: EMERGENCY MEDICINE | Admitting: EMERGENCY MEDICINE
Payer: MEDICAID

## 2017-02-08 VITALS
OXYGEN SATURATION: 97 % | TEMPERATURE: 98.1 F | HEIGHT: 60 IN | DIASTOLIC BLOOD PRESSURE: 59 MMHG | WEIGHT: 103.4 LBS | HEART RATE: 99 BPM | SYSTOLIC BLOOD PRESSURE: 95 MMHG | BODY MASS INDEX: 20.3 KG/M2 | RESPIRATION RATE: 17 BRPM

## 2017-02-08 DIAGNOSIS — F19.10 POLYSUBSTANCE ABUSE (HCC): ICD-10-CM

## 2017-02-08 DIAGNOSIS — J96.10 CHRONIC RESPIRATORY FAILURE, UNSPECIFIED WHETHER WITH HYPOXIA OR HYPERCAPNIA (HCC): ICD-10-CM

## 2017-02-08 DIAGNOSIS — Z78.9 POOR TOLERANCE FOR AMBULATION: ICD-10-CM

## 2017-02-08 DIAGNOSIS — E87.1 HYPONATREMIA: ICD-10-CM

## 2017-02-08 PROBLEM — R53.81 DEBILITY: Status: ACTIVE | Noted: 2017-02-08

## 2017-02-08 PROBLEM — R62.7 FTT (FAILURE TO THRIVE) IN ADULT: Status: ACTIVE | Noted: 2017-02-08

## 2017-02-08 LAB
ALBUMIN SERPL BCP-MCNC: 3.1 G/DL (ref 3.2–4.9)
ALBUMIN/GLOB SERPL: 1 G/DL
ALP SERPL-CCNC: 58 U/L (ref 30–99)
ALT SERPL-CCNC: 27 U/L (ref 2–50)
ANION GAP SERPL CALC-SCNC: 6 MMOL/L (ref 0–11.9)
ANION GAP SERPL CALC-SCNC: 8 MMOL/L (ref 0–11.9)
AST SERPL-CCNC: 26 U/L (ref 12–45)
BASOPHILS # BLD AUTO: 0.3 % (ref 0–1.8)
BASOPHILS # BLD: 0.02 K/UL (ref 0–0.12)
BILIRUB SERPL-MCNC: 0.5 MG/DL (ref 0.1–1.5)
BUN SERPL-MCNC: 5 MG/DL (ref 8–22)
BUN SERPL-MCNC: 6 MG/DL (ref 8–22)
CALCIUM SERPL-MCNC: 9.1 MG/DL (ref 8.4–10.2)
CALCIUM SERPL-MCNC: 9.1 MG/DL (ref 8.5–10.5)
CHLORIDE SERPL-SCNC: 95 MMOL/L (ref 96–112)
CHLORIDE SERPL-SCNC: 98 MMOL/L (ref 96–112)
CO2 SERPL-SCNC: 24 MMOL/L (ref 20–33)
CO2 SERPL-SCNC: 25 MMOL/L (ref 20–33)
CREAT SERPL-MCNC: 0.32 MG/DL (ref 0.5–1.4)
CREAT SERPL-MCNC: <0.3 MG/DL (ref 0.5–1.4)
EOSINOPHIL # BLD AUTO: 0.09 K/UL (ref 0–0.51)
EOSINOPHIL NFR BLD: 1.5 % (ref 0–6.9)
ERYTHROCYTE [DISTWIDTH] IN BLOOD BY AUTOMATED COUNT: 48.7 FL (ref 35.9–50)
GFR SERPL CREATININE-BSD FRML MDRD: >60 ML/MIN/1.73 M 2
GFR SERPL CREATININE-BSD FRML MDRD: >60 ML/MIN/1.73 M 2
GLOBULIN SER CALC-MCNC: 3 G/DL (ref 1.9–3.5)
GLUCOSE SERPL-MCNC: 107 MG/DL (ref 65–99)
GLUCOSE SERPL-MCNC: 119 MG/DL (ref 65–99)
HCT VFR BLD AUTO: 33.9 % (ref 37–47)
HGB BLD-MCNC: 11.9 G/DL (ref 12–16)
IMM GRANULOCYTES # BLD AUTO: 0.03 K/UL (ref 0–0.11)
IMM GRANULOCYTES NFR BLD AUTO: 0.5 % (ref 0–0.9)
LYMPHOCYTES # BLD AUTO: 1 K/UL (ref 1–4.8)
LYMPHOCYTES NFR BLD: 17.2 % (ref 22–41)
MAGNESIUM SERPL-MCNC: 1.9 MG/DL (ref 1.5–2.5)
MCH RBC QN AUTO: 34.8 PG (ref 27–33)
MCHC RBC AUTO-ENTMCNC: 35.1 G/DL (ref 33.6–35)
MCV RBC AUTO: 99.1 FL (ref 81.4–97.8)
MONOCYTES # BLD AUTO: 0.91 K/UL (ref 0–0.85)
MONOCYTES NFR BLD AUTO: 15.6 % (ref 0–13.4)
NEUTROPHILS # BLD AUTO: 3.78 K/UL (ref 2–7.15)
NEUTROPHILS NFR BLD: 64.9 % (ref 44–72)
NRBC # BLD AUTO: 0 K/UL
NRBC BLD AUTO-RTO: 0 /100 WBC
PLATELET # BLD AUTO: 272 K/UL (ref 164–446)
PMV BLD AUTO: 8.6 FL (ref 9–12.9)
POTASSIUM SERPL-SCNC: 3.9 MMOL/L (ref 3.6–5.5)
POTASSIUM SERPL-SCNC: 4.2 MMOL/L (ref 3.6–5.5)
PROT SERPL-MCNC: 6.1 G/DL (ref 6–8.2)
RBC # BLD AUTO: 3.42 M/UL (ref 4.2–5.4)
SODIUM SERPL-SCNC: 128 MMOL/L (ref 135–145)
SODIUM SERPL-SCNC: 128 MMOL/L (ref 135–145)
TSH SERPL DL<=0.005 MIU/L-ACNC: 0.31 UIU/ML (ref 0.35–5.5)
WBC # BLD AUTO: 5.8 K/UL (ref 4.8–10.8)

## 2017-02-08 PROCEDURE — 99239 HOSP IP/OBS DSCHRG MGMT >30: CPT | Mod: GC | Performed by: INTERNAL MEDICINE

## 2017-02-08 PROCEDURE — 83930 ASSAY OF BLOOD OSMOLALITY: CPT

## 2017-02-08 PROCEDURE — 700112 HCHG RX REV CODE 229: Performed by: HOSPITALIST

## 2017-02-08 PROCEDURE — G0378 HOSPITAL OBSERVATION PER HR: HCPCS

## 2017-02-08 PROCEDURE — 36415 COLL VENOUS BLD VENIPUNCTURE: CPT

## 2017-02-08 PROCEDURE — A9270 NON-COVERED ITEM OR SERVICE: HCPCS | Performed by: HOSPITALIST

## 2017-02-08 PROCEDURE — 700102 HCHG RX REV CODE 250 W/ 637 OVERRIDE(OP): Performed by: HOSPITALIST

## 2017-02-08 PROCEDURE — 83036 HEMOGLOBIN GLYCOSYLATED A1C: CPT

## 2017-02-08 PROCEDURE — 99285 EMERGENCY DEPT VISIT HI MDM: CPT

## 2017-02-08 PROCEDURE — A9270 NON-COVERED ITEM OR SERVICE: HCPCS | Performed by: EMERGENCY MEDICINE

## 2017-02-08 PROCEDURE — 80048 BASIC METABOLIC PNL TOTAL CA: CPT

## 2017-02-08 PROCEDURE — 700111 HCHG RX REV CODE 636 W/ 250 OVERRIDE (IP): Performed by: HOSPITALIST

## 2017-02-08 PROCEDURE — 700111 HCHG RX REV CODE 636 W/ 250 OVERRIDE (IP): Performed by: INTERNAL MEDICINE

## 2017-02-08 PROCEDURE — 85025 COMPLETE CBC W/AUTO DIFF WBC: CPT

## 2017-02-08 PROCEDURE — 80053 COMPREHEN METABOLIC PANEL: CPT

## 2017-02-08 PROCEDURE — 83735 ASSAY OF MAGNESIUM: CPT

## 2017-02-08 PROCEDURE — 74000 DX-ABDOMEN-1 VIEW: CPT

## 2017-02-08 PROCEDURE — 700102 HCHG RX REV CODE 250 W/ 637 OVERRIDE(OP): Performed by: EMERGENCY MEDICINE

## 2017-02-08 PROCEDURE — 84443 ASSAY THYROID STIM HORMONE: CPT

## 2017-02-08 PROCEDURE — 99220 PR INITIAL OBSERVATION CARE,LEVL III: CPT | Performed by: INTERNAL MEDICINE

## 2017-02-08 RX ORDER — ONDANSETRON 4 MG/1
4 TABLET, ORALLY DISINTEGRATING ORAL EVERY 6 HOURS PRN
Status: DISCONTINUED | OUTPATIENT
Start: 2017-02-08 | End: 2017-02-08 | Stop reason: HOSPADM

## 2017-02-08 RX ORDER — POLYETHYLENE GLYCOL 3350 17 G/17G
1 POWDER, FOR SOLUTION ORAL DAILY
Status: DISCONTINUED | OUTPATIENT
Start: 2017-02-09 | End: 2017-02-17 | Stop reason: HOSPADM

## 2017-02-08 RX ORDER — TEMAZEPAM 15 MG/1
30 CAPSULE ORAL NIGHTLY PRN
Status: DISCONTINUED | OUTPATIENT
Start: 2017-02-08 | End: 2017-02-09

## 2017-02-08 RX ORDER — AMOXICILLIN 250 MG
1 CAPSULE ORAL NIGHTLY
Status: DISCONTINUED | OUTPATIENT
Start: 2017-02-09 | End: 2017-02-17 | Stop reason: HOSPADM

## 2017-02-08 RX ORDER — BISACODYL 10 MG
10 SUPPOSITORY, RECTAL RECTAL DAILY
Status: DISCONTINUED | OUTPATIENT
Start: 2017-02-09 | End: 2017-02-17 | Stop reason: HOSPADM

## 2017-02-08 RX ORDER — AMOXICILLIN 250 MG
1 CAPSULE ORAL
Status: DISCONTINUED | OUTPATIENT
Start: 2017-02-08 | End: 2017-02-17 | Stop reason: HOSPADM

## 2017-02-08 RX ORDER — HYDROCODONE BITARTRATE AND ACETAMINOPHEN 10; 325 MG/1; MG/1
1-2 TABLET ORAL EVERY 6 HOURS PRN
Status: DISCONTINUED | OUTPATIENT
Start: 2017-02-08 | End: 2017-02-11

## 2017-02-08 RX ORDER — LACTULOSE 20 G/30ML
30 SOLUTION ORAL
Status: DISCONTINUED | OUTPATIENT
Start: 2017-02-08 | End: 2017-02-17 | Stop reason: HOSPADM

## 2017-02-08 RX ORDER — ACETAMINOPHEN 325 MG/1
650 TABLET ORAL EVERY 6 HOURS PRN
Status: DISCONTINUED | OUTPATIENT
Start: 2017-02-08 | End: 2017-02-17 | Stop reason: HOSPADM

## 2017-02-08 RX ORDER — PROMETHAZINE HYDROCHLORIDE 25 MG/1
12.5-25 SUPPOSITORY RECTAL EVERY 4 HOURS PRN
Status: DISCONTINUED | OUTPATIENT
Start: 2017-02-08 | End: 2017-02-17 | Stop reason: HOSPADM

## 2017-02-08 RX ORDER — BISACODYL 10 MG
10 SUPPOSITORY, RECTAL RECTAL
Status: DISCONTINUED | OUTPATIENT
Start: 2017-02-08 | End: 2017-02-17 | Stop reason: HOSPADM

## 2017-02-08 RX ORDER — ENEMA 19; 7 G/133ML; G/133ML
1 ENEMA RECTAL
Status: DISCONTINUED | OUTPATIENT
Start: 2017-02-08 | End: 2017-02-17 | Stop reason: HOSPADM

## 2017-02-08 RX ORDER — ONDANSETRON 2 MG/ML
4 INJECTION INTRAMUSCULAR; INTRAVENOUS EVERY 4 HOURS PRN
Status: DISCONTINUED | OUTPATIENT
Start: 2017-02-08 | End: 2017-02-17 | Stop reason: HOSPADM

## 2017-02-08 RX ORDER — HYDROCODONE BITARTRATE AND ACETAMINOPHEN 10; 325 MG/1; MG/1
1 TABLET ORAL ONCE
Status: COMPLETED | OUTPATIENT
Start: 2017-02-08 | End: 2017-02-08

## 2017-02-08 RX ORDER — TEMAZEPAM 15 MG/1
30 CAPSULE ORAL ONCE
Status: COMPLETED | OUTPATIENT
Start: 2017-02-08 | End: 2017-02-08

## 2017-02-08 RX ORDER — HYDROCODONE BITARTRATE AND ACETAMINOPHEN 5; 325 MG/1; MG/1
1 TABLET ORAL ONCE
Status: COMPLETED | OUTPATIENT
Start: 2017-02-08 | End: 2017-02-08

## 2017-02-08 RX ORDER — PROMETHAZINE HYDROCHLORIDE 25 MG/1
12.5-25 TABLET ORAL EVERY 4 HOURS PRN
Status: DISCONTINUED | OUTPATIENT
Start: 2017-02-08 | End: 2017-02-17 | Stop reason: HOSPADM

## 2017-02-08 RX ORDER — DOCUSATE SODIUM 100 MG/1
100 CAPSULE, LIQUID FILLED ORAL 2 TIMES DAILY
Status: DISCONTINUED | OUTPATIENT
Start: 2017-02-09 | End: 2017-02-17 | Stop reason: HOSPADM

## 2017-02-08 RX ORDER — ONDANSETRON 4 MG/1
4 TABLET, ORALLY DISINTEGRATING ORAL EVERY 4 HOURS PRN
Status: DISCONTINUED | OUTPATIENT
Start: 2017-02-08 | End: 2017-02-17 | Stop reason: HOSPADM

## 2017-02-08 RX ORDER — HYDROCODONE BITARTRATE AND ACETAMINOPHEN 10; 325 MG/1; MG/1
1-2 TABLET ORAL EVERY 6 HOURS PRN
Qty: 30 TAB | Refills: 0 | Status: SHIPPED | OUTPATIENT
Start: 2017-02-08

## 2017-02-08 RX ORDER — TEMAZEPAM 15 MG/1
CAPSULE ORAL
Status: COMPLETED
Start: 2017-02-08 | End: 2017-02-08

## 2017-02-08 RX ADMIN — HYDROCODONE BITARTRATE AND ACETAMINOPHEN 1 TABLET: 5; 325 TABLET ORAL at 13:41

## 2017-02-08 RX ADMIN — HYDROCODONE BITARTRATE AND ACETAMINOPHEN 1 TABLET: 5; 325 TABLET ORAL at 17:46

## 2017-02-08 RX ADMIN — DOCUSATE SODIUM 100 MG: 100 CAPSULE ORAL at 08:07

## 2017-02-08 RX ADMIN — ONDANSETRON 4 MG: 4 TABLET, ORALLY DISINTEGRATING ORAL at 13:41

## 2017-02-08 RX ADMIN — TEMAZEPAM 30 MG: 15 CAPSULE ORAL at 21:42

## 2017-02-08 RX ADMIN — HYDROCODONE BITARTRATE AND ACETAMINOPHEN 1 TABLET: 5; 325 TABLET ORAL at 09:38

## 2017-02-08 RX ADMIN — ENOXAPARIN SODIUM 30 MG: 100 INJECTION SUBCUTANEOUS at 08:07

## 2017-02-08 RX ADMIN — HYDROCODONE BITARTRATE AND ACETAMINOPHEN 1 TABLET: 10; 325 TABLET ORAL at 21:11

## 2017-02-08 RX ADMIN — HYDROCODONE BITARTRATE AND ACETAMINOPHEN 1 TABLET: 5; 325 TABLET ORAL at 01:10

## 2017-02-08 RX ADMIN — TEMAZEPAM 15 MG: 15 CAPSULE ORAL at 08:07

## 2017-02-08 RX ADMIN — HYDROCODONE BITARTRATE AND ACETAMINOPHEN 1 TABLET: 5; 325 TABLET ORAL at 05:26

## 2017-02-08 ASSESSMENT — LIFESTYLE VARIABLES: EVER_SMOKED: YES

## 2017-02-08 ASSESSMENT — PAIN SCALES - GENERAL
PAINLEVEL_OUTOF10: 9
PAINLEVEL_OUTOF10: 9
PAINLEVEL_OUTOF10: 8

## 2017-02-08 NOTE — IP AVS SNAPSHOT
" <p align=\"LEFT\"><IMG SRC=\"//[email or web address]\" alt=\"Image\" WIDTH=\"50%\" HEIGHT=\"200\" BORDER=\"\"></p>                   Name:Fabiola Chacon  Medical Record Number:[id number]  CSN: [id number]    YOB: 1955   Age: 61 y.o.  Sex: female  HT:1.702 m (5' 7.01\") WT: 46.72 kg (103 lb)          Admit Date: 2/8/2017     Discharge Date:   Today's Date: 2/17/2017  Attending Doctor:  Vern Marie M.D.                  Allergies:  Codeine; Ultram; and Percocet          Follow-up Information     1. Follow up with Saud Cheng M.D.. Go on 2/21/2017.    Specialty:  Internal Medicine    Why:  Please arrive at 4:20pm for your appointment.    Contact information    [address] [address]  [phone]           Medication List      Take these Medications        Instructions    Acetaminophen 650 MG Tabs    Take 650 mg by mouth every 6 hours as needed for Fever or Mild Pain (Temp greater than 100.5 F or Mild Pain (1-3)).   Dose:  650 mg       bisacodyl 10 MG Supp   Commonly known as:  DULCOLAX    Insert 1 Suppository in rectum every 24 hours as needed (if lactulose ineffective).   Dose:  10 mg       ondansetron 4 MG Tbdp   Commonly known as:  ZOFRAN ODT    Take 1 Tab by mouth every 8 hours as needed for Nausea/Vomiting (give PO if no IV route available).   Dose:  4 mg       polyethylene glycol/lytes Pack   Commonly known as:  MIRALAX    Take 1 Packet by mouth every day.   Dose:  17 g       RESTORIL 30 MG capsule   Generic drug:  temazepam    Take 30 mg by mouth at bedtime as needed for Sleep.   Dose:  30 mg         Ask your Physician about these medications        Instructions    hydrocodone/acetaminophen  MG Tabs   Commonly known as:  NORCO    Take 1-2 Tabs by mouth every 6 hours as needed.   Dose:  1-2 Tab         "

## 2017-02-08 NOTE — IP AVS SNAPSHOT
" Home Care Instructions                                                                                                                  Name:Fabiola Chacon  Medical Record Number:[id number]  CSN: [id number]    YOB: 1955   Age: 61 y.o.  Sex: female  HT:1.702 m (5' 7.01\") WT: 46.72 kg (103 lb)          Admit Date: 2/8/2017     Discharge Date:   Today's Date: 2/17/2017  Attending Doctor:  Vern Marie M.D.                  Allergies:  Codeine; Ultram; and Percocet            Discharge Instructions       Discharge Instructions    Discharged to home by taxi with escort. Discharged via wheelchair, hospital escort: Yes.  Special equipment needed: Not Applicable    Be sure to schedule a follow-up appointment with your primary care doctor or any specialists as instructed.     Discharge Plan:   Influenza Vaccine Indication: Patient Refuses    I understand that a diet low in cholesterol, fat, and sodium is recommended for good health. Unless I have been given specific instructions below for another diet, I accept this instruction as my diet prescription.   Other diet: regular    Special Instructions: None    · Is patient discharged on Warfarin / Coumadin?   No     · Is patient Post Blood Transfusion?  No    Depression / Suicide Risk    As you are discharged from this [facility] facility, it is important to learn how to keep safe from harming yourself.    Recognize the warning signs:  · Abrupt changes in personality, positive or negative- including increase in energy   · Giving away possessions  · Change in eating patterns- significant weight changes-  positive or negative  · Change in sleeping patterns- unable to sleep or sleeping all the time   · Unwillingness or inability to communicate  · Depression  · Unusual sadness, discouragement and loneliness  · Talk of wanting to die  · Neglect of personal appearance   · Rebelliousness- reckless behavior  · Withdrawal from people/activities they love  · Confusion- " inability to concentrate     If you or a loved one observes any of these behaviors or has concerns about self-harm, here's what you can do:  · Talk about it- your feelings and reasons for harming yourself  · Remove any means that you might use to hurt yourself (examples: pills, rope, extension cords, firearm)  · Get professional help from the community (Mental Health, Substance Abuse, psychological counseling)  · Do not be alone:Call your Safe Contact- someone whom you trust who will be there for you.  · Call your local CRISIS HOTLINE 382-1839 or 588-682-6900  · Call your local Children's Mobile Crisis Response Team [place] (350) 501-2957 or www.Stocard  · Call the toll free National Suicide Prevention Hotlines   · National Suicide Prevention Lifeline 110-722-ZSEZ (6584)  · [facility] 800-SUICIDE (678-0603)        Follow-up Information     1. Follow up with Saud Cheng M.D.. Go on 2/21/2017.    Specialty:  Internal Medicine    Why:  Please arrive at 4:20pm for your appointment.    Contact information    [address]  [address]  [facility] [address]  [phone]           Discharge Medication Instructions:    Below are the medications your physician expects you to take upon discharge:    Review all your home medications and newly ordered medications with your doctor and/or pharmacist. Follow medication instructions as directed by your doctor and/or pharmacist.    Please keep your medication list with you and share with your physician.               Medication List      START taking these medications        Instructions    bisacodyl 10 MG Supp   Last time this was given:  10 mg on 2/16/2017  9:37 AM   Commonly known as:  DULCOLAX    Insert 1 Suppository in rectum every 24 hours as needed (if lactulose ineffective).   Dose:  10 mg       ondansetron 4 MG Tbdp   Last time this was given:  4 mg on 2/17/2017 12:25 PM   Commonly known as:  ZOFRAN ODT    Take 1 Tab by mouth every  8 hours as needed for Nausea/Vomiting (give PO if no IV route available).   Dose:  4 mg       polyethylene glycol/lytes Pack   Last time this was given:  1 Packet on 2/17/2017  8:04 AM   Commonly known as:  MIRALAX    Take 1 Packet by mouth every day.   Dose:  17 g         CONTINUE taking these medications        Instructions    Acetaminophen 650 MG Tabs   Last time this was given:  650 mg on 2/12/2017  9:33 AM    Take 650 mg by mouth every 6 hours as needed for Fever or Mild Pain (Temp greater than 100.5 F or Mild Pain (1-3)).   Dose:  650 mg       RESTORIL 30 MG capsule   Last time this was given:  15 mg on 2/17/2017  8:07 AM   Generic drug:  temazepam    Take 30 mg by mouth at bedtime as needed for Sleep.   Dose:  30 mg         ASK your doctor about these medications        Instructions    hydrocodone/acetaminophen  MG Tabs   Last time this was given:  1 Tab on 2/17/2017 12:25 PM   Commonly known as:  NORCO    Take 1-2 Tabs by mouth every 6 hours as needed.   Dose:  1-2 Tab               Instructions           Diet / Nutrition:    Follow any diet instructions given to you by your doctor or the dietician, including how much salt (sodium) you are allowed each day.    If you are overweight, talk to your doctor about a weight reduction plan.    Activity:    Remain physically active following your doctor's instructions about exercise and activity.    Rest often.     Any time you become even a little tired or short of breath, SIT DOWN and rest.    Worsening Symptoms:    Report any of the following signs and symptoms to the doctor's office immediately:    *Pain of jaw, arm, or neck  *Chest pain not relieved by medication                               *Dizziness or loss of consciousness  *Difficulty breathing even when at rest   *More tired than usual                                       *Bleeding drainage or swelling of surgical site  *Swelling of feet, ankles, legs or stomach                 *Fever  (>100ºF)  *Pink or blood tinged sputum  *Weight gain (3lbs/day or 5lbs /week)           *Shock from internal defibrillator (if applicable)  *Palpitations or irregular heartbeats                *Cool and/or numb extremities    Stroke Awareness    Common Risk Factors for Stroke include:    Age  Atrial Fibrillation  Carotid Artery Stenosis  Diabetes Mellitus  Excessive alcohol consumption  High blood pressure  Overweight   Physical inactivity  Smoking    Warning signs and symptoms of a stroke include:    *Sudden numbness or weakness of the face, arm or leg (especially on one side of the body).  *Sudden confusion, trouble speaking or understanding.  *Sudden trouble seeing in one or both eyes.  *Sudden trouble walking, dizziness, loss of balance or coordination.Sudden severe headache with no known cause.    It is very important to get treatment quickly when a stroke occurs. If you experience any of the above warning signs, call 911 immediately.                   Disclaimer         Quit Smoking / Tobacco Use:    I understand the use of any tobacco products increases my chance of suffering from future heart disease or stroke and could cause other illnesses which may shorten my life. Quitting the use of tobacco products is the single most important thing I can do to improve my health. For further information on smoking / tobacco cessation call a Toll Free Quit Line at 1-345.165.1805 (*National Cancer [place]) or 1-512.290.2133 (American Lung Association) or you can access the web based program at www.lungusa.org.    [place] Tobacco Users Help Line:  (729) 554-2682       Toll Free: 1-183.155.9513  Quit Tobacco Program [facility] (010)130-4401    Crisis Hotline:    [place] Crisis Hotline:  9-027-UTFXADP or 1-875.471.7545    [place] Crisis Hotline:    1-168.996.5685 or 076-121-9731    Discharge Survey:   Thank you for choosing [facility]. We hope we did everything we could to make your hospital stay  a pleasant one. You may be receiving a phone survey and we would appreciate your time and participation in answering the questions. Your input is very valuable to us in our efforts to improve our service to our patients and their families.        My signature on this form indicates that:    1. I have reviewed and understand the above information.  2. My questions regarding this information have been answered to my satisfaction.  3. I have formulated a plan with my discharge nurse to obtain my prescribed medications for home.                  Disclaimer         __________________________________                     __________       ________                       Patient Signature                                                 Date                    Time

## 2017-02-08 NOTE — DISCHARGE PLANNING
Call from Savanna at EPS taking on this case.  Now open EPS case.  Forwarded Savanna to Neuro SW.

## 2017-02-08 NOTE — IP AVS SNAPSHOT
2/17/2017          Fabiola Chacon  [address] [place] [address]    Dear Fabiola:    [facility] wants to ensure your discharge home is safe and you or your loved ones have had all your questions answered regarding your care after you leave the hospital.    You may receive a telephone call within two days of your discharge.  This call is to make certain you understand your discharge instructions as well as ensure we provided you with the best care possible during your stay with us.     The call will only last approximately 3-5 minutes and will be done by a nurse.    Once again, we want to ensure your discharge home is safe and that you have a clear understanding of any next steps in your care.  If you have any questions or concerns, please do not hesitate to contact us, we are here for you.  Thank you for choosing [facility] for your healthcare needs.    Sincerely,    Leandro Valdovinos    [facility]
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
## 2017-02-08 NOTE — PROGRESS NOTES
Received report from night RN.  Assumed care of patient.  Pt AA&Ox4.  JUDD without difficulty; LE weaker than UE.  Denies n/t.  C/O n/v, pain.  Medications given per MAR.  SCDs in place.  Assessment completed as charted.  Plan of care discussed.  All questions answered at this time.

## 2017-02-08 NOTE — IP AVS SNAPSHOT
Bambisa Access Code: [email or web address]  Expires: 3/10/2017  5:39 PM    Your email address is not on file at [facility].  Email Addresses are required for you to sign up for Bambisa, please contact [phone] to verify your personal information and to provide your email address prior to attempting to register for Bambisa.    Fabiola Craig  [address] [place] [address]    Bambisa  A secure, online tool to manage your health information     [facility]’s Bambisa® is a secure, online tool that connects you to your personalized health information from the privacy of your home -- day or night - making it very easy for you to manage your healthcare. Once the activation process is completed, you can even access your medical information using the Bambisa gerardo, which is available for free in the Apple Gerardo store or Google Play store.     To learn more about Bambisa, visit [email or web address]    There are two levels of access available (as shown below):   My Chart Features  [facility] Primary Care Doctor [facility]  Specialists [facility]  Urgent  Care Non-[facility] Primary Care Doctor   Email your healthcare team securely and privately 24/7 X X X    Manage appointments: schedule your next appointment; view details of past/upcoming appointments X      Request prescription refills. X      View recent personal medical records, including lab and immunizations X X X X   View health record, including health history, allergies, medications X X X X   Read reports about your outpatient visits, procedures, consult and ER notes X X X X   See your discharge summary, which is a recap of your hospital and/or ER visit that includes your diagnosis, lab results, and care plan X X  X     How to register for Bambisa:  Once your e-mail address has been verified, follow the following steps to sign up for Bambisa.     1. Go to  [email or web address]  2. Click on the Sign Up Now box, which takes you to the New Member Sign Up page. You will need  to provide the following information:  a. Enter your [employer] [facility] Access Code exactly as it appears at the top of this page. (You will not need to use this code after you’ve completed the sign-up process. If you do not sign up before the expiration date, you must request a new code.)   b. Enter your date of birth.   c. Enter your home email address.   d. Click Submit, and follow the next screen’s instructions.  3. Create a [employer] ID. This will be your [employer] login ID and cannot be changed, so think of one that is secure and easy to remember.  4. Create a [employer] password. You can change your password at any time.  5. Enter your Password Reset Question and Answer. This can be used at a later time if you forget your password.   6. Enter your e-mail address. This allows you to receive e-mail notifications when new information is available in Planet [employer]ige.  7. Click Sign Up. You can now view your health information.    For assistance activating your [employer] account, call [phone]

## 2017-02-08 NOTE — DISCHARGE PLANNING
WELLINGTON spoke to MD, MD states he is comfortable with pt discharging home without 24/7 supervision.     SW spoke to pt, who states she wants to go home but that her wheelchair is at home and she cannot walk or transfer by herself. Pt asked her neighbor to bring her wheelchair to the hospital, however pt's neighbor stated that she does not feel it's safe for pt to DC home. SW called and left messages for neighbor two times requesting call back.     Per [facility], [facility] quoted $50 for transport home, however they are not available to take pt today.     WELLINGTON spoke to EPS SW, notified her that pt will likely DC home today. Provided requested documentation (psych note, h&p, MAR)     WELLINGTON left message for SS Supervisor Therese requesting assistance on getting pt home.

## 2017-02-08 NOTE — CARE PLAN
Problem: Communication  Goal: The ability to communicate needs accurately and effectively will improve  Pt able to make needs known.     Problem: Safety  Goal: Will remain free from injury  Safety precautions in place.  Bed locked and in low position.  Call light within reach.

## 2017-02-08 NOTE — PROGRESS NOTES
Patient Aox4 and resitng comfortably in bed. Updated patient on plan of care. Verbalizes understanding.   Patient reporting pain in lower extremities rating it a 10 out of 10. Pain medication administered per MAR.  BLE weakness noted upon assessment.   Patient requesting mac-and-cheese. Mac-and-cheese provided.   All needs addressed at this time Call light and personal belongings within reach, bed in lowest position, tread socks in place and hourly rounding done.

## 2017-02-09 PROBLEM — K59.03 CONSTIPATION DUE TO OPIOID THERAPY: Status: ACTIVE | Noted: 2017-02-09

## 2017-02-09 PROBLEM — M41.9 SCOLIOSIS: Status: ACTIVE | Noted: 2017-02-09

## 2017-02-09 PROBLEM — T40.2X5A CONSTIPATION DUE TO OPIOID THERAPY: Status: ACTIVE | Noted: 2017-02-09

## 2017-02-09 PROBLEM — E87.1 CHRONIC HYPONATREMIA: Status: ACTIVE | Noted: 2017-02-09

## 2017-02-09 PROBLEM — G40.909 SEIZURE DISORDER (HCC): Status: ACTIVE | Noted: 2017-02-09

## 2017-02-09 PROBLEM — F11.20 OPIATE DEPENDENCE (HCC): Status: ACTIVE | Noted: 2017-02-09

## 2017-02-09 LAB
ALBUMIN SERPL BCP-MCNC: 2.8 G/DL (ref 3.2–4.9)
ALBUMIN/GLOB SERPL: 1 G/DL
ALP SERPL-CCNC: 58 U/L (ref 30–99)
ALT SERPL-CCNC: 21 U/L (ref 2–50)
ANION GAP SERPL CALC-SCNC: 8 MMOL/L (ref 0–11.9)
AST SERPL-CCNC: 26 U/L (ref 12–45)
BASOPHILS # BLD AUTO: 0.4 % (ref 0–1.8)
BASOPHILS # BLD: 0.03 K/UL (ref 0–0.12)
BILIRUB SERPL-MCNC: 0.4 MG/DL (ref 0.1–1.5)
BUN SERPL-MCNC: <5 MG/DL (ref 8–22)
CALCIUM SERPL-MCNC: 8.7 MG/DL (ref 8.4–10.2)
CHLORIDE SERPL-SCNC: 95 MMOL/L (ref 96–112)
CO2 SERPL-SCNC: 27 MMOL/L (ref 20–33)
CORTIS SERPL-MCNC: 18.1 UG/DL (ref 0–23)
CREAT SERPL-MCNC: 0.47 MG/DL (ref 0.5–1.4)
EOSINOPHIL # BLD AUTO: 0.15 K/UL (ref 0–0.51)
EOSINOPHIL NFR BLD: 1.9 % (ref 0–6.9)
ERYTHROCYTE [DISTWIDTH] IN BLOOD BY AUTOMATED COUNT: 50 FL (ref 35.9–50)
EST. AVERAGE GLUCOSE BLD GHB EST-MCNC: 88 MG/DL
GFR SERPL CREATININE-BSD FRML MDRD: >60 ML/MIN/1.73 M 2
GLOBULIN SER CALC-MCNC: 2.8 G/DL (ref 1.9–3.5)
GLUCOSE SERPL-MCNC: 110 MG/DL (ref 65–99)
HBA1C MFR BLD: 4.7 % (ref 0–5.6)
HCT VFR BLD AUTO: 33.1 % (ref 37–47)
HGB BLD-MCNC: 11.8 G/DL (ref 12–16)
IMM GRANULOCYTES # BLD AUTO: 0.04 K/UL (ref 0–0.11)
IMM GRANULOCYTES NFR BLD AUTO: 0.5 % (ref 0–0.9)
LYMPHOCYTES # BLD AUTO: 0.9 K/UL (ref 1–4.8)
LYMPHOCYTES NFR BLD: 11.4 % (ref 22–41)
MCH RBC QN AUTO: 35.3 PG (ref 27–33)
MCHC RBC AUTO-ENTMCNC: 35.6 G/DL (ref 33.6–35)
MCV RBC AUTO: 99.1 FL (ref 81.4–97.8)
MONOCYTES # BLD AUTO: 0.98 K/UL (ref 0–0.85)
MONOCYTES NFR BLD AUTO: 12.4 % (ref 0–13.4)
NEUTROPHILS # BLD AUTO: 5.82 K/UL (ref 2–7.15)
NEUTROPHILS NFR BLD: 73.4 % (ref 44–72)
NRBC # BLD AUTO: 0 K/UL
NRBC BLD AUTO-RTO: 0 /100 WBC
OSMOLALITY SERPL: 265 MOSM/KG H2O (ref 278–298)
PLATELET # BLD AUTO: 294 K/UL (ref 164–446)
PMV BLD AUTO: 8.9 FL (ref 9–12.9)
POTASSIUM SERPL-SCNC: 3.8 MMOL/L (ref 3.6–5.5)
PROT SERPL-MCNC: 5.6 G/DL (ref 6–8.2)
RBC # BLD AUTO: 3.34 M/UL (ref 4.2–5.4)
SODIUM SERPL-SCNC: 130 MMOL/L (ref 135–145)
TSH SERPL DL<=0.005 MIU/L-ACNC: 0.46 UIU/ML (ref 0.35–5.5)
WBC # BLD AUTO: 7.9 K/UL (ref 4.8–10.8)

## 2017-02-09 PROCEDURE — 97161 PT EVAL LOW COMPLEX 20 MIN: CPT

## 2017-02-09 PROCEDURE — G8980 MOBILITY D/C STATUS: HCPCS | Mod: CM

## 2017-02-09 PROCEDURE — G8979 MOBILITY GOAL STATUS: HCPCS | Mod: CM

## 2017-02-09 PROCEDURE — A9270 NON-COVERED ITEM OR SERVICE: HCPCS | Performed by: INTERNAL MEDICINE

## 2017-02-09 PROCEDURE — 700102 HCHG RX REV CODE 250 W/ 637 OVERRIDE(OP): Performed by: INTERNAL MEDICINE

## 2017-02-09 PROCEDURE — 84443 ASSAY THYROID STIM HORMONE: CPT

## 2017-02-09 PROCEDURE — G8978 MOBILITY CURRENT STATUS: HCPCS | Mod: CM

## 2017-02-09 PROCEDURE — G0378 HOSPITAL OBSERVATION PER HR: HCPCS

## 2017-02-09 PROCEDURE — 700112 HCHG RX REV CODE 229: Performed by: INTERNAL MEDICINE

## 2017-02-09 PROCEDURE — 700111 HCHG RX REV CODE 636 W/ 250 OVERRIDE (IP): Performed by: INTERNAL MEDICINE

## 2017-02-09 PROCEDURE — 99226 PR SUBSEQUENT OBSERVATION CARE,LEVEL III: CPT | Performed by: INTERNAL MEDICINE

## 2017-02-09 PROCEDURE — 82533 TOTAL CORTISOL: CPT

## 2017-02-09 PROCEDURE — 85025 COMPLETE CBC W/AUTO DIFF WBC: CPT

## 2017-02-09 PROCEDURE — 80053 COMPREHEN METABOLIC PANEL: CPT

## 2017-02-09 RX ORDER — TEMAZEPAM 15 MG/1
15 CAPSULE ORAL 3 TIMES DAILY PRN
Status: DISCONTINUED | OUTPATIENT
Start: 2017-02-09 | End: 2017-02-17 | Stop reason: HOSPADM

## 2017-02-09 RX ADMIN — POLYETHYLENE GLYCOL 3350 1 PACKET: 17 POWDER, FOR SOLUTION ORAL at 09:17

## 2017-02-09 RX ADMIN — SENNOSIDES AND DOCUSATE SODIUM 1 TABLET: 8.6; 5 TABLET ORAL at 03:12

## 2017-02-09 RX ADMIN — TEMAZEPAM 15 MG: 15 CAPSULE ORAL at 21:29

## 2017-02-09 RX ADMIN — HYDROCODONE BITARTRATE AND ACETAMINOPHEN 2 TABLET: 10; 325 TABLET ORAL at 09:12

## 2017-02-09 RX ADMIN — TEMAZEPAM 15 MG: 15 CAPSULE ORAL at 19:52

## 2017-02-09 RX ADMIN — DOCUSATE SODIUM 100 MG: 100 CAPSULE, LIQUID FILLED ORAL at 09:12

## 2017-02-09 RX ADMIN — ONDANSETRON 4 MG: 4 TABLET, ORALLY DISINTEGRATING ORAL at 03:11

## 2017-02-09 RX ADMIN — TEMAZEPAM 15 MG: 15 CAPSULE ORAL at 14:26

## 2017-02-09 RX ADMIN — HYDROCODONE BITARTRATE AND ACETAMINOPHEN 2 TABLET: 10; 325 TABLET ORAL at 20:36

## 2017-02-09 RX ADMIN — HYDROCODONE BITARTRATE AND ACETAMINOPHEN 1 TABLET: 10; 325 TABLET ORAL at 03:12

## 2017-02-09 RX ADMIN — HYDROCODONE BITARTRATE AND ACETAMINOPHEN 2 TABLET: 10; 325 TABLET ORAL at 14:26

## 2017-02-09 RX ADMIN — DOCUSATE SODIUM 100 MG: 100 CAPSULE, LIQUID FILLED ORAL at 20:36

## 2017-02-09 RX ADMIN — SENNOSIDES AND DOCUSATE SODIUM 1 TABLET: 8.6; 5 TABLET ORAL at 20:37

## 2017-02-09 RX ADMIN — ONDANSETRON 4 MG: 4 TABLET, ORALLY DISINTEGRATING ORAL at 19:48

## 2017-02-09 ASSESSMENT — PAIN SCALES - GENERAL
PAINLEVEL_OUTOF10: 10
PAINLEVEL_OUTOF10: 8
PAINLEVEL_OUTOF10: 10

## 2017-02-09 ASSESSMENT — LIFESTYLE VARIABLES: EVER_SMOKED: YES

## 2017-02-09 ASSESSMENT — GAIT ASSESSMENTS: GAIT LEVEL OF ASSIST: UNABLE TO PARTICIPATE

## 2017-02-09 NOTE — PROGRESS NOTES
Hospital Medicine Progress Note, Adult, Complex               Author: Arun Call Date & Time created: 2/9/2017  11:29 AM     Interval History:  60yo F admitted for FTT as patient was recently discharged from [facility] but unable to take care of herself at home.     Patient states she's anxious and wants more Restoril.      Review of Systems:  Review of Systems   Unable to perform ROS: medical condition       Physical Exam:  Physical Exam   Constitutional: She is oriented to person, place, and time.   Frail, abdnormal posturing   HENT:   Head: Normocephalic and atraumatic.   Eyes: Conjunctivae and EOM are normal. Pupils are equal, round, and reactive to light.   Neck: Normal range of motion. Neck supple.   Cardiovascular: Normal rate and normal heart sounds.    Pulmonary/Chest: Effort normal and breath sounds normal.   Abdominal: Soft. Bowel sounds are normal.   Musculoskeletal: Normal range of motion.   Neurological: She is alert and oriented to person, place, and time. She has normal reflexes.   Skin: Skin is warm and dry.   Psychiatric: Her behavior is normal. Judgment and thought content normal.   anxious       Labs:        Invalid input(s): [id number]      Recent Labs      02/08/17 0214 02/08/17 2115 02/09/17   0747   SODIUM  128*  128*  130*   POTASSIUM  4.2  3.9  3.8   CHLORIDE  98  95*  95*   CO2  24  25  27   BUN  5*  6*  <5*   CREATININE  0.32*  <0.30*  0.47*   MAGNESIUM   --   1.9   --    CALCIUM  9.1  9.1  8.7     Recent Labs      02/07/17 0407 02/08/17 0214 02/08/17 2115 02/09/17   0747   ALTSGPT  24   --   27  21   ASTSGOT  26   --   26  26   ALKPHOSPHAT  60   --   58  58   TBILIRUBIN  0.4   --   0.5  0.4   GLUCOSE  112*  119*  107*  110*     Recent Labs      02/07/17 0407 02/08/17 2115 02/09/17   0747   RBC  3.65*  3.42*  3.34*   HEMOGLOBIN  12.7  11.9*  11.8*   HEMATOCRIT  35.9*  33.9*  33.1*   PLATELETCT  242  272  294     Recent Labs      02/07/17 0407 02/08/17 2115   17   0747   WBC  7.7  5.8  7.9   NEUTSPOLYS   --   64.90  73.40*   LYMPHOCYTES   --   17.20*  11.40*   MONOCYTES   --   15.60*  12.40   EOSINOPHILS   --   1.50  1.90   BASOPHILS   --   0.30  0.40   ASTSGOT  26  26  26   ALTSGPT  24  27  21   ALKPHOSPHAT  60  58  58   TBILIRUBIN  0.4  0.5  0.4           Hemodynamics:  Temp (24hrs), Av.7 °C (98 °F), Min:36.4 °C (97.5 °F), Max:37.2 °C (98.9 °F)  Temperature: 36.6 °C (97.8 °F)  Pulse  Av.6  Min: 68  Max: 89   Blood Pressure: (!) 99/66 mmHg     Respiratory:    Respiration: 18, Pulse Oximetry: 100 %        RUL Breath Sounds: Clear, RML Breath Sounds: Clear, RLL Breath Sounds: Clear;Diminished, JOSEPHINE Breath Sounds: Clear, LLL Breath Sounds: Clear;Diminished  Fluids:    Intake/Output Summary (Last 24 hours) at 17 1129  Last data filed at 17 0900   Gross per 24 hour   Intake    480 ml   Output      0 ml   Net    480 ml     Weight: 46.72 kg (103 lb) (Pt unable to stand at this time. )  GI/Nutrition:  Orders Placed This Encounter   Procedures   • DIET ORDER     Standing Status: Standing      Number of Occurrences: 1      Standing Expiration Date:      Order Specific Question:  Diet:     Answer:  Regular [1]     Order Specific Question:  Miscellaneous modifications:     Answer:  Vegetarian [13]     Medical Decision Making, by Problem:  Active Hospital Problems    Diagnosis   • Constipation due to opioid therapy [K59.03, T40.2X5A]  - cont with bowel meds and assess for BMs  - considering adding Relistor     • Chronic hyponatremia [E87.1]  - stable; no neuro issues     • Opiate dependence (CMS-HCC) [F11.20]  - encourage cessation due to complications  - pt not agreeable at this time     • Seizure disorder (CMS-HCC) [G40.909]  - no seizure activity  - was due to bzd withdrawals   - cont monitoring; no meds necessary  -    • Scoliosis [M41.9]  - PT/OT  - pt refusing SNF     • FTT (failure to thrive) in adult [R62.7]  - cont to encourage PO intake   -  refusing SNF  - SW consult placed; f/u recs     • Insomnia [G47.00]/Anxiety  - cont outpt Restoril; monitor         EKG reviewed, Labs reviewed, Medications reviewed and Radiology images reviewed  Alcantar catheter: No Alcantar      DVT Prophylaxis: Enoxaparin (Lovenox)        Assessed for rehab: Patient was assess for and/or received rehabilitation services during this hospitalization

## 2017-02-09 NOTE — DISCHARGE PLANNING
Medical Social Work    Ongoing: This  received report from Unit WELLINGTON Winter that SW needs to confirm with pt that she has keys to get into her home.  Per pt her neighbor has keys and she will assist her to get inside her home.  Per pt she refuses to leave [facility] unless she gets pain pills.  This  informed pt that this  is unsure if pt will be prescribed medications to go home with.  This  updated pt that [facility] will be here at 1800 to  pt.  Pt states she will get in contact with neighbor.  Pt still insisting on getting pain pills.  Update to RN regarding  time for pt.
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
## 2017-02-09 NOTE — ED NOTES
Chief Complaint   Patient presents with   • Failure to Thrive     Pt BIB [facility] due to inability to care for self, no caregivers. Pt was d/c from [facility] this evening, when [facility] entered home pt it was inhabitable. Pt is bed bound, unable to get self to restroom, to make food. Neighbor occasionally cares for her- refused today.   Pt very concerned about receiving her Norco, notified that the ERP must see her first and will get to her as quickly as possible.   Pt has 2 dogs at home, neighbor has been caring for them, unsure if he will continue to.

## 2017-02-09 NOTE — THERAPY
Occupational Therapist-   OT eval attempted, pt not receptive to talking to therapist, or participating in any therapy tasks until she gets her meds figured out.  MD coming in room to discuss.  Will follow and eval if/as appropriate.

## 2017-02-09 NOTE — DIETARY
Nutrition Services: Pt seen for poor PO follow up. Diet= Regular, Vegetarian. Pt reports only eating bites of meals and feels nauseous. Diet= Regular, Vegetarian. RN reports pt ate two portions of eggs today despite saying she is nauseous. Discussed tips for optimizing nutrition, pt agreeable to try high protein snacks/supplements. Pt may D/C today.     Pertinent Labs: Na+ 128, BUN 5, Creat 0.32. Prealbumin was 19.0 on 2/2  Pertinent Meds: Bowel protocol, Zofran PRN.   Skin: Wound to sacrum.     Plan/Rec:   1) Once supplement orders received, will add Boost Plus (vanilla and strawberry) after lunch and dinner as snacks. High protein snack added after breakfast.   2) Consider wound care consult to evaluate skin.   3) Monitor and replete Na+ PRN.     RD to follow per department policy.

## 2017-02-09 NOTE — DISCHARGE INSTRUCTIONS
Discharge Instructions    Discharged to home by car with escort. Discharged via wheelchair, hospital escort: Yes.  Special equipment needed: Not Applicable    Be sure to schedule a follow-up appointment with your primary care doctor or any specialists as instructed.     Discharge Plan:   Influenza Vaccine Indication: Patient Refuses    I understand that a diet low in cholesterol, fat, and sodium is recommended for good health. Unless I have been given specific instructions below for another diet, I accept this instruction as my diet prescription.   Other diet: Reg      Special Instructions: None    · Is patient discharged on Warfarin / Coumadin?   No     · Is patient Post Blood Transfusion?  No    Depression / Suicide Risk    As you are discharged from this [facility] facility, it is important to learn how to keep safe from harming yourself.    Recognize the warning signs:  · Abrupt changes in personality, positive or negative- including increase in energy   · Giving away possessions  · Change in eating patterns- significant weight changes-  positive or negative  · Change in sleeping patterns- unable to sleep or sleeping all the time   · Unwillingness or inability to communicate  · Depression  · Unusual sadness, discouragement and loneliness  · Talk of wanting to die  · Neglect of personal appearance   · Rebelliousness- reckless behavior  · Withdrawal from people/activities they love  · Confusion- inability to concentrate     If you or a loved one observes any of these behaviors or has concerns about self-harm, here's what you can do:  · Talk about it- your feelings and reasons for harming yourself  · Remove any means that you might use to hurt yourself (examples: pills, rope, extension cords, firearm)  · Get professional help from the community (Mental Health, Substance Abuse, psychological counseling)  · Do not be alone:Call your Safe Contact- someone whom you trust who will be there for you.  · Call your local  CRISIS HOTLINE 842-1158 or 789-375-9762  · Call your local Children's Mobile Crisis Response Team [place] (166) 709-1680 or www.[employer]  · Call the toll free National Suicide Prevention Hotlines   · National Suicide Prevention Lifeline 308-362-ALCN (4647)  · National NearWoo Line Network 800-SUICIDE (932-4617)

## 2017-02-09 NOTE — DISCHARGE PLANNING
SS Supervisor Therese advised [facility] to complete Approved Services for [facility] transport home for this pt today - $934.40. [facility]INGTON faxed [facility]CS and Approved Services Form to [facility].   [facility] requested Evening [facility] Sidra follow up with pt and RN regarding transport time.
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
## 2017-02-09 NOTE — CARE PLAN
Problem: Nutritional:  Goal: Achieve adequate nutritional intake  Patient will consume 50% of meals  Outcome: PROGRESSING AS EXPECTED

## 2017-02-09 NOTE — DISCHARGE PLANNING
Transport arranged with [facility]. Patient will be leaving today @1800 via [facility] going home. WELLINGTON Valente notified.

## 2017-02-09 NOTE — PROGRESS NOTES
After extensive discussion with patient she wishes to go home. I have discussed with patient that if she goes home she will unlikely have any physical strength to take care of herself. She does not realize she is very feeble. She believes a caretaker will take care of her needs. She reports when she is home she is just sitting in her chair and goes to bed. She says she does not need any physical strength because she does not do anything besides that. She says if something were to happen to her she will go to [facility] because she does not like [facility]. I have offered multiple times that she can go to a rehab center or skilled nursing facility where she can participate in physical therapy to get her strength back. She has declined multiple times. She wishes only to go home.     She requested pain medications repeatedly. I told her I can only provide a limited amount and not #180 tabs that she gets from her pain specialist, Dr. Cheng.

## 2017-02-09 NOTE — DISCHARGE PLANNING
WELLINGTON called [phone] and was informed that they were already planning to see this patient this afternoon.

## 2017-02-09 NOTE — ED PROVIDER NOTES
"ED Provider Note    CHIEF COMPLAINT  Chief Complaint   Patient presents with   • Failure to Thrive     Pt CLAYTON FELIPE due to inability to care for self, no caregivers. Pt was d/c from [facility] this evening, when MATTEO entered home pt it was inhabitable. Pt is bed bound, unable to get self to restroom, to make food. Neighbor occasionally cares for her- refused today.       HPI  Fabiola Chacon is a 61 y.o. female who presents to the emergency department by ambulance after being discharged earlier today. Apparently there is no one to take care of her patient was described as a hoarder (minutes dropped her off at her home and could not help with bringing her back to the hospital as he floor was covered with human and animal feces. Patient is bed bound secondary to scoliosis and unable to get herself to the restroom. She has been aided by a neighbor in the past who refuses to a care of her.    Upon arrival patient states \"I need my drugs\". Patient is requesting emergently that she gets her Restoril and Norco. Patient was unable to get these prescriptions filled prior to arrival at home.    There was interaction by psychiatry services at [facility] if patient was competent to make a decision. She knows that she can not go home at this time. Discusses possibility of a [place]  tomorrow. She describes no pain. No difficulty with eating. No shortness of breath.    She presented with marked hyponatremia required intubation for respiratory support. Patient had multiple seizures. She was admitted on the 1st of this month with diagnoses of altered mental status and hyponatremia. Patient was last seen in September 2017 for altered mental state as well and admitted to the hospital. In 2013 she had an ER evaluation for back pain weakness and tingling.    During her hospitalizations patient's drug screen was positive for opiates and cannabis.      Primary Diagnosis:    Altered consciousness [R40.4]        Secondary " Diagnoses:                 Active Hospital Problems      Diagnosis    •  Debility [R53.81]    •  Hyponatremia [E87.1]    •  Normocytic anemia [D64.9]    •  Insomnia secondary to depression with anxiety [F41.8, F51.05]    •  Chronic low back pain [M54.5, G89.29]        Hospital Summary (Brief Narrative):       Patient is 61 year old female with pmhx chronic back & neck pain was brought to ER by MATTEO after neighbors called for AMS. She had tonic-clonic seizures while in ER, with a Na 108 and was treated with 3%  cc bolus following which her Na went up to 117. CT head at the time of admission was negative, and utox was positive for opioids & cannabinoids. She had to be intubated (2/2/17) and sedated for airway management as she continued to seize. Patient's seizures were treated with ativan and she was on propofol for sedation. Patient's seizures were most likely secondary to severe hyponatremia, 0.9% NS was used to correct Na @ 50cc an hour with q4 hrs bmp. Given that her Na corrected too fast to 122 on Day 2, D5W and DDAVP had to be given following which Na was slowly corrected with just 0.9% NS. Pt was extubated on 2/3/17. Following extubation patient was thought to be  Suicidal with easy to anger/frustrate, irritability and was refusing care. Psychiatry evaluated patient and diagnosed her with personality traits but no SI. Regarding her living situation - per neighbor patient is a hoarder, her floor is covered with animal and human feces. Has privately hired caregiver (Gonzalo) and Neighbor Viv. She is non ambulatory and wheel chair bound. Elder protective service was called.  EPS supervisor Delmy De Anda [phone].            REVIEW OF SYSTEMS  See HPI for further details. All other systems are negative.     PAST MEDICAL HISTORY  Past Medical History   Diagnosis Date   • Insomnia    • Scoliosis    • Fall    • Backpain      scoliosis and neck pain       FAMILY HISTORY  No significant family  "history    SOCIAL HISTORY  Social History     Social History   • Marital Status: Single     Spouse Name: N/A   • Number of Children: N/A   • Years of Education: N/A     Social History Main Topics   • Smoking status: Former Smoker   • Smokeless tobacco: None   • Alcohol Use: No   • Drug Use: No   • Sexual Activity: Not Asked     Other Topics Concern   • None     Social History Narrative       SURGICAL HISTORY  Past Surgical History   Procedure Laterality Date   • Tubal coagulation laparoscopic bilateral         CURRENT MEDICATIONS  Home Medications     Reviewed by Mckenzie Elizabeth R.N. (Registered Nurse) on 02/08/17 at 2024  Med List Status: Complete    Medication Last Dose Status    acetaminophen 650 MG TABS 2/8/2017 Active    hydrocodone/acetaminophen (NORCO)  MG Tab 2/8/2017 Active    temazepam (RESTORIL) 30 MG capsule prn Active                 ALLERGIES  Allergies   Allergen Reactions   • Codeine    • Ultram [Tramadol Hcl]      seizure   • Percocet [Oxycodone-Acetaminophen]        PHYSICAL EXAM  VITAL SIGNS: /85 mmHg  Pulse 89  Temp(Src) 37.2 °C (98.9 °F)  Resp 18  Ht 1.702 m (5' 7.01\")  Wt 46.72 kg (103 lb)  BMI 16.13 kg/m2  SpO2 95%    Constitutional: Well developed, Well nourished, No acute distress, Non-toxic appearance. Appears older than stated age soft collar in place. Notes chronic neck pain.   HENT: Normocephalic, Atraumatic, Bilateral external ears normal, Oropharynx moist, no evidence of dehydration, No oral exudates, Nose normal.   Eyes: PERRLA, EOMI, Conjunctiva normal, No discharge.   Neck: Normal range of motion, No tenderness, Supple, No stridor. No masses. No evidence of meningitis or meningismus.   Lymphatic: No lymphadenopathy noted.   Cardiovascular: Normal heart rate, Normal rhythm, No murmurs, No rubs, No gallops.   Thorax & Lungs: Normal breath sounds, No respiratory distress, No wheezing or rhonchi, No chest tenderness.   Abdomen: Bowel sounds normal, Soft, No " tenderness, No masses, No pulsatile masses. No guarding or rebound. No evidence of peritoneal findings.  Skin: Warm, Dry, No erythema, No rash. No exanthem.   Back: No tenderness.   Genitalia: External genitalia appear normal, No masses or lesions. No discharge.   Rectal: Normal appearance, Normal sphincter tone. No external or internal lesions noted.   Extremities: Intact distal pulses, No edema, No tenderness, No cyanosis, No clubbing.   Musculoskeletal: Good range of motion in all major joints. No major deformities noted.   Neurologic: Alert & oriented x 3, Normal motor function, No focal deficits noted.   Psychiatric: Affect normal, mood normal.                                                     Urologic: No CVA tenderness no evidence of pyelonephritis.                             RADIOLOGY/PROCEDURES      COURSE & MEDICAL DECISION MAKING  Pertinent Labs & Imaging studies reviewed. (See chart for details)  Results for RADHA CANELA (MRN [id number]) as of 2/8/2017 20:35   Ref. Range 2/7/2017 04:07 2/8/2017 02:14   WBC Latest Ref Range: 4.8-10.8 K/uL 7.7    RBC Latest Ref Range: 4.20-5.40 M/uL 3.65 (L)    Hemoglobin Latest Ref Range: 12.0-16.0 g/dL 12.7    Hematocrit Latest Ref Range: 37.0-47.0 % 35.9 (L)    MCV Latest Ref Range: 81.4-97.8 fL 98.4 (H)    MCH Latest Ref Range: 27.0-33.0 pg 34.8 (H)    MCHC Latest Ref Range: 33.6-35.0 g/dL 35.4 (H)    RDW Latest Ref Range: 35.9-50.0 fL 49.5    Platelet Count Latest Ref Range: 164-446 K/uL 242    MPV Latest Ref Range: 9.0-12.9 fL 8.9 (L)    Sodium Latest Ref Range: 135-145 mmol/L 128 (L) 128 (L)   Potassium Latest Ref Range: 3.6-5.5 mmol/L 3.7 4.2   Chloride Latest Ref Range:  mmol/L 98 98   Co2 Latest Ref Range: 20-33 mmol/L 24 24   Anion Gap Latest Ref Range: 0.0-11.9  6.0 6.0   Glucose Latest Ref Range: 65-99 mg/dL 112 (H) 119 (H)   Bun Latest Ref Range: 8-22 mg/dL 4 (L) 5 (L)   Creatinine Latest Ref Range: 0.50-1.40 mg/dL 0.31 (L) 0.32 (L)   GFR If African  American Latest Ref Range: >60 mL/min/1.73 m 2 >60 >60   GFR If Non  Latest Ref Range: >60 mL/min/1.73 m 2 >60 >60   Calcium Latest Ref Range: 8.5-10.5 mg/dL 8.6 9.1   AST(SGOT) Latest Ref Range: 12-45 U/L 26    ALT(SGPT) Latest Ref Range: 2-50 U/L 24    Alkaline Phosphatase Latest Ref Range: 30-99 U/L 60    Total Bilirubin Latest Ref Range: 0.1-1.5 mg/dL 0.4    Albumin Latest Ref Range: 3.2-4.9 g/dL 3.1 (L)    Total Protein Latest Ref Range: 6.0-8.2 g/dL 5.8 (L)    Globulin Latest Ref Range: 1.9-3.5 g/dL 2.7    A-G Ratio Latest Units: g/dL 1.1    Cortisol Latest Ref Range: 0.0-23.0 ug/dL 18.4      Above laboratory from early this morning.    Results for orders placed or performed during the hospital encounter of 02/08/17   CBC WITH DIFFERENTIAL   Result Value Ref Range    WBC 5.8 4.8 - 10.8 K/uL    RBC 3.42 (L) 4.20 - 5.40 M/uL    Hemoglobin 11.9 (L) 12.0 - 16.0 g/dL    Hematocrit 33.9 (L) 37.0 - 47.0 %    MCV 99.1 (H) 81.4 - 97.8 fL    MCH 34.8 (H) 27.0 - 33.0 pg    MCHC 35.1 (H) 33.6 - 35.0 g/dL    RDW 48.7 35.9 - 50.0 fL    Platelet Count 272 164 - 446 K/uL    MPV 8.6 (L) 9.0 - 12.9 fL    Neutrophils-Polys 64.90 44.00 - 72.00 %    Lymphocytes 17.20 (L) 22.00 - 41.00 %    Monocytes 15.60 (H) 0.00 - 13.40 %    Eosinophils 1.50 0.00 - 6.90 %    Basophils 0.30 0.00 - 1.80 %    Immature Granulocytes 0.50 0.00 - 0.90 %    Nucleated RBC 0.00 /100 WBC    Neutrophils (Absolute) 3.78 2.00 - 7.15 K/uL    Lymphs (Absolute) 1.00 1.00 - 4.80 K/uL    Monos (Absolute) 0.91 (H) 0.00 - 0.85 K/uL    Eos (Absolute) 0.09 0.00 - 0.51 K/uL    Baso (Absolute) 0.02 0.00 - 0.12 K/uL    Immature Granulocytes (abs) 0.03 0.00 - 0.11 K/uL    NRBC (Absolute) 0.00 K/uL   COMP METABOLIC PANEL   Result Value Ref Range    Sodium 128 (L) 135 - 145 mmol/L    Potassium 3.9 3.6 - 5.5 mmol/L    Chloride 95 (L) 96 - 112 mmol/L    Co2 25 20 - 33 mmol/L    Anion Gap 8.0 0.0 - 11.9    Glucose 107 (H) 65 - 99 mg/dL    Bun 6 (L) 8 - 22 mg/dL     Creatinine <0.30 (L) 0.50 - 1.40 mg/dL    Calcium 9.1 8.4 - 10.2 mg/dL    AST(SGOT) 26 12 - 45 U/L    ALT(SGPT) 27 2 - 50 U/L    Alkaline Phosphatase 58 30 - 99 U/L    Total Bilirubin 0.5 0.1 - 1.5 mg/dL    Albumin 3.1 (L) 3.2 - 4.9 g/dL    Total Protein 6.1 6.0 - 8.2 g/dL    Globulin 3.0 1.9 - 3.5 g/dL    A-G Ratio 1.0 g/dL   Magnesium   Result Value Ref Range    Magnesium 1.9 1.5 - 2.5 mg/dL   ESTIMATED GFR   Result Value Ref Range    GFR If African American >60 >60 mL/min/1.73 m 2    GFR If Non African American >60 >60 mL/min/1.73 m 2        Patient remains hyponatremic at 128. Findings discussed with internal medicine. Patient will be transferred to [facility] where additional psychiatry services can be performed. Patient received her Vicodin and Restoril as requested. Disposition transfer.    FINAL IMPRESSION  1. Polysubstance abuse    2. Hyponatremia    3. Chronic respiratory failure, unspecified whether with hypoxia or hypercapnia (CMS-HCC)    5. Scoliosis  6. Unable to ambulate       Electronically signed by: Talat Ramsey, 2/8/2017 8:33 PM

## 2017-02-09 NOTE — H&P
CHIEF COMPLAINT:  Failure to thrive, brought in by [facility] since the patient was   not able to take care of herself, no caregiver, and the patient was just   discharged from [facility] from the [facility] service to home by   [facility].    HISTORY OF PRESENT ILLNESS:  The patient is a 61-year-old female who was under   the care of [facility] internal medicine service, admitted on 02/01/2017 to ICU in   [facility] after the patient had a tonic-clonic seizure, acute altered mental status   with a sodium level of 108.  She was treated in the ICU with hypertonic   saline by critical care intensivist and then later transitioned to medical   floor.  During hospital course, the patient was evaluated for the altered   mental status and delirium.  Her CT head was negative.  Urine drug screen was   positive for marijuana and opioids.  She was intubated _____ for airway   protection since she had ongoing seizures from possible benzodiazepine   withdrawal and hyponatremia.  The patient was extubated on 02/03/2017, and   following extubation, the patient was initially thought to be suicidal and   irritable and refusing care, so psychiatrist was evaluating her, they thought   the patient has personality traits, but no suicidal ideation nor intention.    Her living situation was very poor.  The patient is a hoarder, her floor was   covered with animal and human feces.  The patient previously had a caregiver,   but reports that caregiver had taken away her narcotics, so she did not have   any more caregiver and elderly protective services were called while she was   in the [facility].    The patient was bedbound from chronic scoliosis and she has been bedbound for   3 years at least per patient.  The patient was discussed at extent by the [facility]   service for sending her to a skilled nursing facility for placement, but she   refused to go to skilled nursing facility and she adamantly had asked after   physicians over there to  send her back home, and she will be hiring new   caregiver.  The patient was evaluated by psychiatry and the physicians over   there and deemed she has normal capacity though she has poor insight.  So, the   internal medicine service team had discharged her with [facility] and EPS was   supposed to see her tomorrow; however, the patient was transferred back to   [facility] since she was unable to take care of herself and   also the neighbors who occasionally used to take care of her refused to take   care of her and patient was unable to obtain her pain medication, Norco and   Restoril for her sleep.    When I talked to patient, the patient was anxious and all she wanted is   Restoril.  She received a Norco and her pain is better.  She has been   constipated for 3 days now.  Her abdomen is distended, but no tenderness on   palpation past.  He stated that she will never go back to [facility] that she does   not like and she wishes to go to [place] if possible.  I explained to her   and she is willing to stay in [facility].    REVIEW OF SYSTEMS:  Please see HPI, other than what is mentioned in the HPI,   rest of the review of systems is negative.    PAST MEDICAL HISTORY:  1.  History of scoliosis and bed bound for more than 3 years.  2.  Chronic narcotic dependence.  3.  Recent seizure disorder, previously also had seizures from tramadol,   however, the patient has not been on any seizure medications.  4.  Profound hyponatremia from likely polydipsia.  5.  Failure to thrive.  6.  Macrocytic anemia.  7.  Chronic opioid-induced constipation.  8.  Recent acute respiratory failure requiring intubation after seizure   episodes, which was thought to be from medication withdrawal and from profound   hyponatremia.    PAST SURGICAL HISTORY:  Tubal coagulation, laparoscopically bilateral.    ALLERGIES:  TO TRAMADOL CAUSES SEIZURES, CODEINE, AND PERCOCET.    FAMILY HISTORY:  The patient stated her mother  just  in September from old   age, 87 years old.  She does not know any medical problems about his father.    SOCIAL HISTORY:  The patient denied smoking, alcohol use or recreational drug   use; however, her previous urine drug screen was apparently positive for   marijuana.    OUTPATIENT MEDICATIONS:  Norco 10/325 one to two tablets q.6 hours p.r.n. for   chronic pain, Restoril 30 mg at bedtime for insomnia, also takes 60 mg q.6   hours p.r.n. for mild pain.    PHYSICAL EXAMINATION:  VITAL SIGNS:  Blood pressure 125/85, pulse 89, sats _____ on room air,   respiratory rate 18.  T-max 98.5.  GENERAL:  No acute distress, afebrile, anxious.  She is awake, alert and   oriented.  HEENT:  Normocephalic, atraumatic.  Pupils equal and react to light,   nonicteric.  She is wearing a soft neck collar for chronic pain.  CHEST:  No chest wall tenderness on palpation.  HEART:  Regular rate and rhythm.  No audible murmurs, rubs, or gallops.  LUNGS:  Diminished at bases.  No accessory muscle use.  No wheezes, rales or   rhonchi.  GASTROINTESTINAL:  Abdomen soft, distended, normal bowel sounds.  No rebound   or guarding, but discomfort on palpation.  BACK:  She has scoliosis, it is at her baseline, and she has chronic low back   pain.  EXTREMITIES:  No cyanosis, no clubbing.  She has lower extremity weakness,   right greater than left and per the patient, is at baseline.  Palpable pedal   pulses.  MUSCULOSKELETAL:  Able to wiggle her toes, but she has more right-sided   weakness than left-sided weakness, which is chronic, unchanged.  SKIN:  Warm and dry.  NEUROPSYCHIATRIC:  Anxious, no tremors.  Her cranial nerves II-XII grossly   intact.  She has capacity per my evaluation, but has poor insight.  No new   focal neuro deficits.  She has bilateral lower extremity weakness, right   greater than left.    LABORATORY DATA:  WBC 5.8, hemoglobin 11.9, hematocrit 33.9, platelets 272.    Chem panel showed sodium of 128, potassium 3.9,  BUN 6, creatinine 0.38,   albumin of 3.1.    ASSESSMENT AND PLAN:  1.  Failure to thrive.  The patient was just discharged per her wishes to   home; however, patient is unable to take care of herself and neighbors were   not able to help her, so MATTEO has brought her back here.  Please consult    at a.m., PT, OT evaluation.  I discussed with the patient   about this, she still refuses to go to skilled nursing facility.  Without   proper safe discharge planning with a proper caregiver and home health   services, the patient is unsafe to be discharged to home at this time,  2.  Chronic hyponatremia.  Likely mixed pattern of polydipsia in the past.    Sodium is better now at 128.  I am checking a urinalysis, urine osmolality and   serum osmolality with a.m. labs.  We will check a thyroid level, a recent   cortisol random was unremarkable.  We will check 7:00 a.m. cortisol as well.  3.  Macrocytic anemia.  Recent evaluation for anemia, B12 and folic acid were   within normal limits.  4.  Constipation, likely opiate induced; however, we will start her on bowel   protocol.  I am starting on MiraLAX and senna-docusate, and we will do a   bisacodyl suppository.  If it is not working, we will have to try enema, even   then if it is not working, we will have to try Relistor.  She has some   distension with discomfort, but no acute abdominal findings.  I am getting an   x-ray of the abdomen.  Please follow the results.  5.  Recent seizure, was thought to be metabolic related from profound   hyponatremia and also possible benzodiazepine withdrawal.  The patient   currently does not have seizures; however, have seizure, aspiration, and fall   precautions in place.  She had a CT head at that time was negative; however,   did not have a complete workup for seizure.  Since she is stable, we will just   continue to monitor her.  7.  Chronic opiate dependence for her back pain.  Monitor carefully patient   since  patient just had a respiratory failure and required intubation, hold   opiates if respiratory rate is less than 10.  8.  Insomnia.  The patient is on Restoril, again monitor respiratory status.    She is doing fairly stable on the current dose.  9.  Failure to thrive.  PT, OT, cognitive evaluation, and also    consult have been placed.    QUALITY MEASURES:  DVT prophylaxis with Lovenox subQ.    CODE STATUS:  Full code.       ____________________________________     HEIDY MD JERMAIN WORKMAN / TAMIR    DD:  02/08/2017 22:24:05  DT:  02/09/2017 00:13:53    D#:  [id number]  Job#:  [id number]

## 2017-02-09 NOTE — PROGRESS NOTES
Patient refuses IV insertion on RN's second attempt. Patient states that she never wanted to be here and wants to go to [facility].

## 2017-02-09 NOTE — THERAPY
"Physical Therapy Evaluation completed.   Bed Mobility:  Supine to Sit: Moderate Assist  Transfers: Sit to Stand: Moderate Assist  Gait: Level Of Assist: Unable to Participate with No Equipment Needed       Plan of Care: Patient with no further skilled PT needs in the acute care setting at this time  Discharge Recommendations: Equipment: Wheelchair.     Patient admitted to [facility] by MATTEO due to poor living conditions. At present, pt appears to be at her baseline, requiring assist for all mobility and to complete ADLs. Pt states she has hired a caregiver service to assist her upon return to home. She may benefit from a wheelchair with a cushion as it appears from chart review she's been sitting in a computer chair with wheels at home. PT will defer to hospitals for further discharge planning with caregiver recommended for all mobility and to assist with ADLs. No further acute PT needs identified.  RN updated.     See \"Rehab Therapy-Acute\" Patient Summary Report for complete documentation.     "

## 2017-02-09 NOTE — DIETARY
"Nutrition Services Consult for Failure to Thrive.  Patient with noted BMI of less than 19.  BMI = 16.31.  Also with Nutrition Admit Triggers for poor PO and weight loss prior to admit.  PMH:  Scoliosis, chronic narcotic dependence, recent seizure disorder w/intubation, chronic opioid constipation.  Pertinent Labs:  Glucose 110, Na 130  Pertinent Medications:  Colace, Miralax, Senokot S  Weight:  46.72 kg (103 lbs)  Ht:  5'7\"  IBW:  135 lbs   %IBW:  76%  BMI:  16.13  Diet Rx:  Regular (vegetarian)  Patient was just discharged from [facility] yesterday.    Visited with patient.  Per patient, she has not had any weight loss.  She states she's had poor PO for about a week because she was in the hospital, but her weight is at her usual body weight.  Per chart review, weight at previous admit on 2/1/17 was ~46.9 kg.  Current weight is 46.72 kg.  She took in 25-50% of breakfast this am and 100% of lunch.  Went over food preferences.  Patient needs soft foods due to missing teeth.  She has no upper teeth.  She likes Strawberry Boost Plus.    Plan/Recommendations:   1)  Strawberry Boost Plus between meals.  2)  Soft foods d/t missing teeth.  3)  RD to monitor PO intake.    "

## 2017-02-09 NOTE — CARE PLAN
Problem: Safety  Goal: Will remain free from injury  Intervention: Provide assistance with mobility  Patient educated on fall risks and importance of using call light. Fall precautions in place: treaded slipper socks, bed is in low position, personal belongings, wastebasket, call light, and phone are within patient's reach. Bed alarm on, hourly rounding in place. Mobility assessed, unable to mobilize. PT/OT ordered. Will cont with care.          Problem: Skin Integrity  Goal: Risk for impaired skin integrity will decrease  Intervention: Assess risk factors for impaired skin integrity and/or pressure ulcers  Pain assessment completed, pharmacologic and non-pharmacologic pain management methods in use. Q2 turns, barrier cream and mepilex in place. Call light within reach. Will cont with care.

## 2017-02-09 NOTE — CARE PLAN
Problem: Nutritional:  Goal: Achieve adequate nutritional intake  Patient will consume >50% of meals   Outcome: NOT MET

## 2017-02-09 NOTE — DISCHARGE PLANNING
SW met with this patient at bedside to discuss current situation.  This Patient just kept saying that she needed her Restoril, over and over.  She stated that I needed to send her home by MATTEO and all she needs is her Restoril and pain medication and she will be fine. This SW shared with her that I was not going to be able to get LUPISSA to take her home again due to the situation which MATTEO reported her home until her home was cleaned up and she had caregivers to assist her so she was safe at home.  WELLINGTON also discussed AMA discharge and she stated that she said we were holding her hostage. This patient stated that she just hired cargiharry's and she would have them come here to meet with us.  SW to check back with her as needed.

## 2017-02-10 LAB
ANION GAP SERPL CALC-SCNC: 9 MMOL/L (ref 0–11.9)
BASOPHILS # BLD AUTO: 0.4 % (ref 0–1.8)
BASOPHILS # BLD: 0.03 K/UL (ref 0–0.12)
BUN SERPL-MCNC: <5 MG/DL (ref 8–22)
CALCIUM SERPL-MCNC: 9 MG/DL (ref 8.4–10.2)
CHLORIDE SERPL-SCNC: 96 MMOL/L (ref 96–112)
CO2 SERPL-SCNC: 28 MMOL/L (ref 20–33)
CREAT SERPL-MCNC: 0.39 MG/DL (ref 0.5–1.4)
EOSINOPHIL # BLD AUTO: 0.14 K/UL (ref 0–0.51)
EOSINOPHIL NFR BLD: 1.9 % (ref 0–6.9)
ERYTHROCYTE [DISTWIDTH] IN BLOOD BY AUTOMATED COUNT: 50.4 FL (ref 35.9–50)
GFR SERPL CREATININE-BSD FRML MDRD: >60 ML/MIN/1.73 M 2
GLUCOSE SERPL-MCNC: 105 MG/DL (ref 65–99)
HCT VFR BLD AUTO: 34.4 % (ref 37–47)
HGB BLD-MCNC: 11.8 G/DL (ref 12–16)
IMM GRANULOCYTES # BLD AUTO: 0.05 K/UL (ref 0–0.11)
IMM GRANULOCYTES NFR BLD AUTO: 0.7 % (ref 0–0.9)
LYMPHOCYTES # BLD AUTO: 1.03 K/UL (ref 1–4.8)
LYMPHOCYTES NFR BLD: 14 % (ref 22–41)
MCH RBC QN AUTO: 34.3 PG (ref 27–33)
MCHC RBC AUTO-ENTMCNC: 34.3 G/DL (ref 33.6–35)
MCV RBC AUTO: 100 FL (ref 81.4–97.8)
MONOCYTES # BLD AUTO: 0.98 K/UL (ref 0–0.85)
MONOCYTES NFR BLD AUTO: 13.3 % (ref 0–13.4)
NEUTROPHILS # BLD AUTO: 5.13 K/UL (ref 2–7.15)
NEUTROPHILS NFR BLD: 69.7 % (ref 44–72)
NRBC # BLD AUTO: 0 K/UL
NRBC BLD AUTO-RTO: 0 /100 WBC
PLATELET # BLD AUTO: 333 K/UL (ref 164–446)
PMV BLD AUTO: 8.6 FL (ref 9–12.9)
POTASSIUM SERPL-SCNC: 4.2 MMOL/L (ref 3.6–5.5)
RBC # BLD AUTO: 3.44 M/UL (ref 4.2–5.4)
SODIUM SERPL-SCNC: 133 MMOL/L (ref 135–145)
WBC # BLD AUTO: 7.4 K/UL (ref 4.8–10.8)

## 2017-02-10 PROCEDURE — 99225 PR SUBSEQUENT OBSERVATION CARE,LEVEL II: CPT | Performed by: INTERNAL MEDICINE

## 2017-02-10 PROCEDURE — G9169 MEMORY GOAL STATUS: HCPCS | Mod: CH

## 2017-02-10 PROCEDURE — 36415 COLL VENOUS BLD VENIPUNCTURE: CPT

## 2017-02-10 PROCEDURE — A9270 NON-COVERED ITEM OR SERVICE: HCPCS | Performed by: INTERNAL MEDICINE

## 2017-02-10 PROCEDURE — 700102 HCHG RX REV CODE 250 W/ 637 OVERRIDE(OP): Performed by: INTERNAL MEDICINE

## 2017-02-10 PROCEDURE — G9170 MEMORY D/C STATUS: HCPCS | Mod: CH

## 2017-02-10 PROCEDURE — G9168 MEMORY CURRENT STATUS: HCPCS | Mod: CH

## 2017-02-10 PROCEDURE — 92523 SPEECH SOUND LANG COMPREHEN: CPT

## 2017-02-10 PROCEDURE — G0378 HOSPITAL OBSERVATION PER HR: HCPCS

## 2017-02-10 PROCEDURE — 700112 HCHG RX REV CODE 229: Performed by: INTERNAL MEDICINE

## 2017-02-10 PROCEDURE — 85025 COMPLETE CBC W/AUTO DIFF WBC: CPT

## 2017-02-10 PROCEDURE — 700111 HCHG RX REV CODE 636 W/ 250 OVERRIDE (IP): Performed by: INTERNAL MEDICINE

## 2017-02-10 PROCEDURE — 80048 BASIC METABOLIC PNL TOTAL CA: CPT

## 2017-02-10 RX ADMIN — TEMAZEPAM 15 MG: 15 CAPSULE ORAL at 14:56

## 2017-02-10 RX ADMIN — ONDANSETRON 4 MG: 4 TABLET, ORALLY DISINTEGRATING ORAL at 09:15

## 2017-02-10 RX ADMIN — TEMAZEPAM 15 MG: 15 CAPSULE ORAL at 21:03

## 2017-02-10 RX ADMIN — TEMAZEPAM 15 MG: 15 CAPSULE ORAL at 09:20

## 2017-02-10 RX ADMIN — DOCUSATE SODIUM 100 MG: 100 CAPSULE, LIQUID FILLED ORAL at 09:00

## 2017-02-10 RX ADMIN — HYDROCODONE BITARTRATE AND ACETAMINOPHEN 2 TABLET: 10; 325 TABLET ORAL at 02:59

## 2017-02-10 RX ADMIN — BISACODYL 10 MG: 10 SUPPOSITORY RECTAL at 09:19

## 2017-02-10 RX ADMIN — HYDROCODONE BITARTRATE AND ACETAMINOPHEN 2 TABLET: 10; 325 TABLET ORAL at 09:16

## 2017-02-10 RX ADMIN — HYDROCODONE BITARTRATE AND ACETAMINOPHEN 2 TABLET: 10; 325 TABLET ORAL at 21:03

## 2017-02-10 RX ADMIN — POLYETHYLENE GLYCOL 3350 1 PACKET: 17 POWDER, FOR SOLUTION ORAL at 09:16

## 2017-02-10 RX ADMIN — HYDROCODONE BITARTRATE AND ACETAMINOPHEN 2 TABLET: 10; 325 TABLET ORAL at 14:56

## 2017-02-10 ASSESSMENT — PAIN SCALES - GENERAL
PAINLEVEL_OUTOF10: 9

## 2017-02-10 ASSESSMENT — ENCOUNTER SYMPTOMS
CONSTIPATION: 1
CONSTITUTIONAL NEGATIVE: 1
RESPIRATORY NEGATIVE: 1
CARDIOVASCULAR NEGATIVE: 1
EYES NEGATIVE: 1

## 2017-02-10 NOTE — PROGRESS NOTES
Report received at change of shift, care of pt  Assumed. Pt requesting Zofran for nausea, medicated per MAR.  Abdomen distended, positive bowel sounds in all 4 quadrants, last BM 3 days ago per pt. Pt agrees to take stool softeners but is refusing lactulose or suppository this evening, states she will take tomorrow.

## 2017-02-10 NOTE — PROGRESS NOTES
2 person max assist to bed from recliner.pt was incont of urine.coccyx and buttocks red.barrier cream applied.pt refused to turn on side.ate most of dinner.

## 2017-02-10 NOTE — DISCHARGE PLANNING
WELLINGTON Lr spoke with pt stating it isn't safe for pt to come home as her home is unclean.  Pt stated that her neighbor Joslyn [phone] and [phone] and left a voicemail on both numbers requesting a call back.

## 2017-02-10 NOTE — THERAPY
"Speech Language Therapy Evaluation completed to address speech, communication and cognition  Functional Status:  Cognitive-linguistic evaluation completed this date. Patient much calmer and pleasant. RN reports patient has been given her Restoril and is happy now. Patient awake and alert. She presents with normal linquistic skills (i.e. verbal expression, auditory comprehension, reading comprehension, written expression, motor speech). She presents with minimally impaired cognition. Specifically, patient has poor insight or denial of poor living situation. She believes her home is fine, even though paramedics report hoarding and feces on floor. Patient also states she will hire caregivers, but was unable to explain how. Her attention, orientation, memory, problem solving, and safety awareness appears WNL. She scored 30/30 on Mini-Mental State Exam and 100% with clock-drawing protocol.   Recommendations:  Patient may benefit from 24 hour supervision/assistance (i.e.SNF or MCC) as she is bedbound and has no caregivers presently. However, she refuses this option. No speech therapy services recommended as patient's poor insight appear to be more behavior/psychological in nature.   Plan of Care: Patient with no further skilled SLP needs in the acute care setting at this time    See \"Rehab Therapy-Acute\" Patient Summary Report for complete documentation.   "

## 2017-02-10 NOTE — PROGRESS NOTES
Pt upset this am.states she is very anxious,requesting she get restoril for her anxiety.she states she takes it 3 times a day at home.spoke with  in rounds,he will address this after rounding on pt.  Pt's abd firm,distended .she states it's been like this for 3 years,no bm for 3 days.miralax given.  1426-pt medicated with norco and restoril.  Pt sitting up in recliner.much calmer after getting these meds.  Wants to remain in the recliner,but did stand with 2 person max assist for a short time to stretch.

## 2017-02-10 NOTE — PROGRESS NOTES
Hospital Medicine Progress Note, Adult, Complex               Author: Arun Call Date & Time created: 2/10/2017  11:51 AM     Interval History:  60yo F admitted for FTT as patient was recently discharged from [facility] but unable to take care of herself at home.     Pt reports flatus, but denies any BMs.  Pt states she wants to go home.     Review of Systems:  Review of Systems   Constitutional: Negative.    HENT: Negative.    Eyes: Negative.    Respiratory: Negative.    Cardiovascular: Negative.    Gastrointestinal: Positive for constipation.       Physical Exam:  Physical Exam   Constitutional: She is oriented to person, place, and time.   Frail, abdnormal posturing   HENT:   Head: Normocephalic and atraumatic.   Eyes: Conjunctivae and EOM are normal. Pupils are equal, round, and reactive to light.   Neck: Normal range of motion. Neck supple.   Cardiovascular: Normal rate and normal heart sounds.    Pulmonary/Chest: Effort normal and breath sounds normal.   Abdominal: Soft. Bowel sounds are normal.   Musculoskeletal: Normal range of motion.   Neurological: She is alert and oriented to person, place, and time. She has normal reflexes.   Skin: Skin is warm and dry.   Psychiatric: Her behavior is normal. Judgment and thought content normal.   anxious       Labs:        Invalid input(s): [id number]      Recent Labs      02/08/17 2115 02/09/17 0747  02/10/17   0335   SODIUM  128*  130*  133*   POTASSIUM  3.9  3.8  4.2   CHLORIDE  95*  95*  96   CO2  25  27  28   BUN  6*  <5*  <5*   CREATININE  <0.30*  0.47*  0.39*   MAGNESIUM  1.9   --    --    CALCIUM  9.1  8.7  9.0     Recent Labs      02/08/17 2115 02/09/17 0747  02/10/17   0335   ALTSGPT  27  21   --    ASTSGOT  26  26   --    ALKPHOSPHAT  58  58   --    TBILIRUBIN  0.5  0.4   --    GLUCOSE  107*  110*  105*     Recent Labs      02/08/17 2115 02/09/17 0747  02/10/17   0335   RBC  3.42*  3.34*  3.44*   HEMOGLOBIN  11.9*  11.8*  11.8*   HEMATOCRIT   33.9*  33.1*  34.4*   PLATELETCT  272  294  333     Recent Labs      17   2115  17   0747  02/10/17   0335   WBC  5.8  7.9  7.4   NEUTSPOLYS  64.90  73.40*  69.70   LYMPHOCYTES  17.20*  11.40*  14.00*   MONOCYTES  15.60*  12.40  13.30   EOSINOPHILS  1.50  1.90  1.90   BASOPHILS  0.30  0.40  0.40   ASTSGOT  26  26   --    ALTSGPT  27  21   --    ALKPHOSPHAT  58  58   --    TBILIRUBIN  0.5  0.4   --            Hemodynamics:  Temp (24hrs), Av.6 °C (97.9 °F), Min:36.5 °C (97.7 °F), Max:36.9 °C (98.4 °F)  Temperature: 36.6 °C (97.8 °F)  Pulse  Av.6  Min: 65  Max: 89   Blood Pressure: 102/63 mmHg     Respiratory:    Respiration: 18, Pulse Oximetry: 98 %        RUL Breath Sounds: Clear, RML Breath Sounds: Clear, RLL Breath Sounds: Diminished, JOSEPHINE Breath Sounds: Clear, LLL Breath Sounds: Diminished  Fluids:    Intake/Output Summary (Last 24 hours) at 02/10/17 1151  Last data filed at 02/10/17 0600   Gross per 24 hour   Intake    620 ml   Output      0 ml   Net    620 ml        GI/Nutrition:  Orders Placed This Encounter   Procedures   • DIET ORDER     Standing Status: Standing      Number of Occurrences: 1      Standing Expiration Date:      Order Specific Question:  Diet:     Answer:  Regular [1]     Order Specific Question:  Miscellaneous modifications:     Answer:  Vegetarian [13]     Medical Decision Making, by Problem:  Active Hospital Problems    Diagnosis   • Constipation due to opioid therapy [K59.03, T40.2X5A]  - cont with bowel meds and assess for BMs  - encourage compliance with meds while inpatient      • Chronic hyponatremia [E87.1]  - improving with IVFs; no neuro issues     • Opiate dependence (CMS-HCC) [F11.20]  - encourage cessation due to complications  - pt not agreeable at this time     • Seizure disorder (CMS-HCC) [G40.909]  - no seizure activity  - was due to bzd withdrawals   - cont monitoring; no meds necessary  -    • Scoliosis [M41.9]  - PT/OT  - pt refusing SNF     • FTT  (failure to thrive) in adult [R62.7]  - cont to encourage PO intake   - refusing SNF  - SW consult placed; f/u recs     • Insomnia [G47.00]/Anxiety  - cont outpt Restoril; monitor       DISPO:  Patient is not medically safe for discharge due to constipation.  Patient is also not a safe discharge as she has not demonstrated ability to take care of herself at this time.     EKG reviewed, Labs reviewed, Medications reviewed and Radiology images reviewed  Alcantar catheter: No Alcantar      DVT Prophylaxis: Enoxaparin (Lovenox)        Assessed for rehab: Patient was assess for and/or received rehabilitation services during this hospitalization

## 2017-02-10 NOTE — PROGRESS NOTES
Per lab pt refusing morning blood draw. Discussed with pt importance of rechecking labs, pt verbalizes understanding and agrees to have labs drawn. Lab called back to bedside.

## 2017-02-10 NOTE — DISCHARGE PLANNING
"WELLINGTON received call from Savanna, MANDI ([phone]).  She stated that she met with this patient yesterday in the hospital.  She stated that the patient refused any assistance telling her that she is not a \"michael case.\"  [facility] stated that they do not have a lot to offer this patient because she is reporting that she has assets and because the patient is not over 65 years of age.  Savanna stated that she would leave the case open for awhile so we can reach out to her if we need to.   "

## 2017-02-10 NOTE — CARE PLAN
Problem: Skin Integrity  Goal: Risk for impaired skin integrity will decrease  Outcome: PROGRESSING AS EXPECTED  Slight redness and skin tear noted to sacrum, barrier cream in use and Mepilex applied. Pt assisted with repositioning in bed, extra pillows used for positioning. Pt is refusing q2 turns. She is incontinent of urine, linens promptly changed when wet.     Problem: Pain Management  Goal: Pain level will decrease to patient’s comfort goal  Pt medicated with Norco for pain with improvement in comfort status.

## 2017-02-10 NOTE — DISCHARGE PLANNING
WELLINGTON Lr met with pt, per pt request and pt gave her the number to [facility] [phone] and told her to speak with Antonietta.  WELLINGTON called and spoke with Antonietta who is a not a candidate due to poor living conditions.  SW recommending a psyc evaluation and will consult SW Supervisor.

## 2017-02-11 PROCEDURE — 700102 HCHG RX REV CODE 250 W/ 637 OVERRIDE(OP): Performed by: INTERNAL MEDICINE

## 2017-02-11 PROCEDURE — 99224 PR SUBSEQUENT OBSERVATION CARE,LEVEL I: CPT | Performed by: INTERNAL MEDICINE

## 2017-02-11 PROCEDURE — A9270 NON-COVERED ITEM OR SERVICE: HCPCS | Performed by: INTERNAL MEDICINE

## 2017-02-11 PROCEDURE — G0378 HOSPITAL OBSERVATION PER HR: HCPCS

## 2017-02-11 PROCEDURE — 700111 HCHG RX REV CODE 636 W/ 250 OVERRIDE (IP): Performed by: INTERNAL MEDICINE

## 2017-02-11 RX ORDER — HYDROCODONE BITARTRATE AND ACETAMINOPHEN 10; 325 MG/1; MG/1
1 TABLET ORAL EVERY 4 HOURS PRN
Status: DISCONTINUED | OUTPATIENT
Start: 2017-02-11 | End: 2017-02-17 | Stop reason: HOSPADM

## 2017-02-11 RX ADMIN — HYDROCODONE BITARTRATE AND ACETAMINOPHEN 1 TABLET: 10; 325 TABLET ORAL at 14:59

## 2017-02-11 RX ADMIN — TEMAZEPAM 15 MG: 15 CAPSULE ORAL at 20:05

## 2017-02-11 RX ADMIN — HYDROCODONE BITARTRATE AND ACETAMINOPHEN 2 TABLET: 10; 325 TABLET ORAL at 02:59

## 2017-02-11 RX ADMIN — TEMAZEPAM 15 MG: 15 CAPSULE ORAL at 09:11

## 2017-02-11 RX ADMIN — HYDROCODONE BITARTRATE AND ACETAMINOPHEN 1 TABLET: 10; 325 TABLET ORAL at 20:05

## 2017-02-11 RX ADMIN — HYDROCODONE BITARTRATE AND ACETAMINOPHEN 2 TABLET: 10; 325 TABLET ORAL at 09:11

## 2017-02-11 RX ADMIN — ONDANSETRON 4 MG: 4 TABLET, ORALLY DISINTEGRATING ORAL at 23:22

## 2017-02-11 RX ADMIN — TEMAZEPAM 15 MG: 15 CAPSULE ORAL at 14:59

## 2017-02-11 ASSESSMENT — ENCOUNTER SYMPTOMS
RESPIRATORY NEGATIVE: 1
EYES NEGATIVE: 1
CONSTIPATION: 0
CONSTITUTIONAL NEGATIVE: 1
CARDIOVASCULAR NEGATIVE: 1

## 2017-02-11 ASSESSMENT — PAIN SCALES - GENERAL
PAINLEVEL_OUTOF10: 10

## 2017-02-11 NOTE — PROGRESS NOTES
Medicated for pain and anxiety this am.incont lg amt urine.  2 person max assist to recliner.sitting up at present

## 2017-02-11 NOTE — DISCHARGE PLANNING
WELLINGTON Lr called pt's neighbor, Viv at [phone] and [phone] and left a voicemail on both numbers requesting a call back.  RE: discharge of pt to home.  Viv will try to get a hold of a neighbor, Terrance and will speak with the pt.  Viv keeps insisting it is not safe for pt to go home. WELLINGTON explained that pt is refusing SNF, shelter or Group Homes.  Pt is insisting on going home so WELLINGTON wanted to see if Viv can help get the pt in her home.  Joslyn will call WELLINGTON Lr back after speaking with Terrance and pt.

## 2017-02-11 NOTE — DISCHARGE PLANNING
WELLINGTON Lr received a call from pt's neighbor, Joslyn who stated that she is taking care of the dogs.  Joslyn stated that she did vacuum the home but it is delbert, dirty and has mouse droppings all through out the home.  WELLINGTON informed Dr. Call and will file another EPS report about the living conditions for this pt.  Pt not on legal hold and will have to arrange [facility] transport home.  According to the neighbor, pt stated that a taxi is coming to get her.

## 2017-02-11 NOTE — PROGRESS NOTES
Pt had dulcolax supp this afternoon with good results.sat on commode 2 person max assist,  Pt eating 100% of diet.drinking fluids,  Incont.lg amts urine.bottom red,cream applied.  Refuses to turn.  S/S spoke with pt this afternoon  Pt.wanting to go home but doesn't have a ride or anyone to care for her at home.  Pt doesn't walk.rt leg doesn't move.requires max assist.to get up to chair

## 2017-02-11 NOTE — CARE PLAN
Problem: Skin Integrity  Goal: Risk for impaired skin integrity will decrease  Outcome: PROGRESSING AS EXPECTED  Sacrum red with x2 small skin tears, mepilex and barrier cream in use. Soiled linens promptly changed. Pt assisted with repositioning but refusing q2 turns.

## 2017-02-11 NOTE — CARE PLAN
Problem: Pain Management  Goal: Pain level will decrease to patient’s comfort goal  Outcome: PROGRESSING AS EXPECTED  Pt medicated with Norco for low back/leg pain with improvement of symptoms.

## 2017-02-11 NOTE — PROGRESS NOTES
Hospital Medicine Progress Note, Adult, Complex               Author: Arun Call Date & Time created: 2/11/2017  11:53 AM     Interval History:  60yo F admitted for FTT as patient was recently discharged from [facility] but unable to take care of herself at home.     Pt had multiple BMs yesterday and overnight.  No other complaints other than wanting to go home.      Review of Systems:  Review of Systems   Constitutional: Negative.    HENT: Negative.    Eyes: Negative.    Respiratory: Negative.    Cardiovascular: Negative.    Gastrointestinal: Negative for constipation.       Physical Exam:  Physical Exam   Constitutional: She is oriented to person, place, and time.   Frail, abdnormal posturing   HENT:   Head: Normocephalic and atraumatic.   Eyes: Conjunctivae and EOM are normal. Pupils are equal, round, and reactive to light.   Neck: Normal range of motion. Neck supple.   Cardiovascular: Normal rate and normal heart sounds.    Pulmonary/Chest: Effort normal and breath sounds normal.   Abdominal: Soft. Bowel sounds are normal.   Musculoskeletal: Normal range of motion.   Neurological: She is alert and oriented to person, place, and time. She has normal reflexes.   Skin: Skin is warm and dry.   Psychiatric: Her behavior is normal. Judgment and thought content normal.   anxious       Labs:        Invalid input(s): [id number]      Recent Labs      02/08/17   2115  02/09/17   0747  02/10/17   0335   SODIUM  128*  130*  133*   POTASSIUM  3.9  3.8  4.2   CHLORIDE  95*  95*  96   CO2  25  27  28   BUN  6*  <5*  <5*   CREATININE  <0.30*  0.47*  0.39*   MAGNESIUM  1.9   --    --    CALCIUM  9.1  8.7  9.0     Recent Labs      02/08/17 2115 02/09/17 0747  02/10/17   0335   ALTSGPT  27  21   --    ASTSGOT  26  26   --    ALKPHOSPHAT  58  58   --    TBILIRUBIN  0.5  0.4   --    GLUCOSE  107*  110*  105*     Recent Labs      02/08/17 2115 02/09/17 0747  02/10/17   0335   RBC  3.42*  3.34*  3.44*   HEMOGLOBIN  11.9*   11.8*  11.8*   HEMATOCRIT  33.9*  33.1*  34.4*   PLATELETCT  272  294  333     Recent Labs      17   2115  17   0747  02/10/17   0335   WBC  5.8  7.9  7.4   NEUTSPOLYS  64.90  73.40*  69.70   LYMPHOCYTES  17.20*  11.40*  14.00*   MONOCYTES  15.60*  12.40  13.30   EOSINOPHILS  1.50  1.90  1.90   BASOPHILS  0.30  0.40  0.40   ASTSGOT  26  26   --    ALTSGPT  27  21   --    ALKPHOSPHAT  58  58   --    TBILIRUBIN  0.5  0.4   --            Hemodynamics:  Temp (24hrs), Av.7 °C (98.1 °F), Min:36.7 °C (98 °F), Max:36.8 °C (98.3 °F)  Temperature: 36.8 °C (98.2 °F)  Pulse  Av.1  Min: 65  Max: 94   Blood Pressure: 112/72 mmHg     Respiratory:    Respiration: 18, Pulse Oximetry: 95 %        RUL Breath Sounds: Clear, RML Breath Sounds: Clear, RLL Breath Sounds: Diminished, JOSEPHINE Breath Sounds: Clear, LLL Breath Sounds: Diminished  Fluids:    Intake/Output Summary (Last 24 hours) at 17 1153  Last data filed at 02/10/17 2200   Gross per 24 hour   Intake   1240 ml   Output      0 ml   Net   1240 ml        GI/Nutrition:  Orders Placed This Encounter   Procedures   • DIET ORDER     Standing Status: Standing      Number of Occurrences: 1      Standing Expiration Date:      Order Specific Question:  Diet:     Answer:  Regular [1]     Order Specific Question:  Miscellaneous modifications:     Answer:  Vegetarian [13]     Medical Decision Making, by Problem:  Active Hospital Problems    Diagnosis   • Constipation due to opioid therapy [K59.03, T40.2X5A]  - resolved with bowel regimen   - monitoring      • Chronic hyponatremia [E87.1]  - improving with IVFs; no neuro issues     • Opiate dependence (CMS-HCC) [F11.20]  - encourage cessation due to complications  - pt not agreeable at this time     • Seizure disorder (CMS-HCC) [G40.909]  - no seizure activity  - was due to bzd withdrawals   - cont monitoring; no meds necessary  -    • Scoliosis [M41.9]  - PT/OT  - pt refusing SNF     • FTT (failure to thrive) in  adult [R62.7]  - cont to encourage PO intake   - refusing SNF  - SW recs appreciated     • Insomnia [G47.00]/Anxiety  - cont outpt Restoril; monitor       DISPO:  Patient medically safe for discharge at this time, but safety is a significant concern at this time.  SW following up with patient's neighbors in hopes that they will be able to take care of patient as she refuses SNF.      EKG reviewed, Labs reviewed, Medications reviewed and Radiology images reviewed  Alcantar catheter: No Alcantar      DVT Prophylaxis: Enoxaparin (Lovenox)        Assessed for rehab: Patient was assess for and/or received rehabilitation services during this hospitalization

## 2017-02-12 PROCEDURE — 700102 HCHG RX REV CODE 250 W/ 637 OVERRIDE(OP): Performed by: INTERNAL MEDICINE

## 2017-02-12 PROCEDURE — 700112 HCHG RX REV CODE 229: Performed by: INTERNAL MEDICINE

## 2017-02-12 PROCEDURE — A9270 NON-COVERED ITEM OR SERVICE: HCPCS | Performed by: INTERNAL MEDICINE

## 2017-02-12 PROCEDURE — G0378 HOSPITAL OBSERVATION PER HR: HCPCS

## 2017-02-12 PROCEDURE — 700111 HCHG RX REV CODE 636 W/ 250 OVERRIDE (IP): Performed by: INTERNAL MEDICINE

## 2017-02-12 PROCEDURE — 99225 PR SUBSEQUENT OBSERVATION CARE,LEVEL II: CPT | Performed by: INTERNAL MEDICINE

## 2017-02-12 RX ADMIN — ACETAMINOPHEN 650 MG: 325 TABLET, FILM COATED ORAL at 09:33

## 2017-02-12 RX ADMIN — HYDROCODONE BITARTRATE AND ACETAMINOPHEN 1 TABLET: 10; 325 TABLET ORAL at 04:02

## 2017-02-12 RX ADMIN — TEMAZEPAM 15 MG: 15 CAPSULE ORAL at 09:35

## 2017-02-12 RX ADMIN — HYDROCODONE BITARTRATE AND ACETAMINOPHEN 1 TABLET: 10; 325 TABLET ORAL at 22:03

## 2017-02-12 RX ADMIN — ONDANSETRON 4 MG: 4 TABLET, ORALLY DISINTEGRATING ORAL at 22:56

## 2017-02-12 RX ADMIN — DOCUSATE SODIUM 100 MG: 100 CAPSULE, LIQUID FILLED ORAL at 09:33

## 2017-02-12 RX ADMIN — TEMAZEPAM 15 MG: 15 CAPSULE ORAL at 16:26

## 2017-02-12 RX ADMIN — HYDROCODONE BITARTRATE AND ACETAMINOPHEN 1 TABLET: 10; 325 TABLET ORAL at 00:22

## 2017-02-12 RX ADMIN — HYDROCODONE BITARTRATE AND ACETAMINOPHEN 1 TABLET: 10; 325 TABLET ORAL at 17:56

## 2017-02-12 RX ADMIN — TEMAZEPAM 15 MG: 15 CAPSULE ORAL at 22:03

## 2017-02-12 RX ADMIN — ENOXAPARIN SODIUM 30 MG: 100 INJECTION SUBCUTANEOUS at 09:33

## 2017-02-12 RX ADMIN — POLYETHYLENE GLYCOL 3350 1 PACKET: 17 POWDER, FOR SOLUTION ORAL at 09:32

## 2017-02-12 RX ADMIN — HYDROCODONE BITARTRATE AND ACETAMINOPHEN 1 TABLET: 10; 325 TABLET ORAL at 09:36

## 2017-02-12 RX ADMIN — HYDROCODONE BITARTRATE AND ACETAMINOPHEN 1 TABLET: 10; 325 TABLET ORAL at 14:01

## 2017-02-12 ASSESSMENT — PAIN SCALES - GENERAL
PAINLEVEL_OUTOF10: 10
PAINLEVEL_OUTOF10: 10
PAINLEVEL_OUTOF10: 7
PAINLEVEL_OUTOF10: 10

## 2017-02-12 ASSESSMENT — ENCOUNTER SYMPTOMS
EYES NEGATIVE: 1
CARDIOVASCULAR NEGATIVE: 1
CONSTIPATION: 0
CONSTITUTIONAL NEGATIVE: 1
RESPIRATORY NEGATIVE: 1

## 2017-02-12 NOTE — PROGRESS NOTES
Hospital Medicine Progress Note, Adult, Complex               Author: Arun Call Date & Time created: 2017  9:29 AM     Interval History:  62yo F admitted for FTT as patient was recently discharged from [facility] but unable to take care of herself at home.     No issues or complaints.  Pt wants to go home.       Review of Systems:  Review of Systems   Constitutional: Negative.    HENT: Negative.    Eyes: Negative.    Respiratory: Negative.    Cardiovascular: Negative.    Gastrointestinal: Negative for constipation.       Physical Exam:  Physical Exam   Constitutional: She is oriented to person, place, and time.   Frail, abdnormal posturing   HENT:   Head: Normocephalic and atraumatic.   Eyes: Conjunctivae and EOM are normal. Pupils are equal, round, and reactive to light.   Neck: Normal range of motion. Neck supple.   Cardiovascular: Normal rate and normal heart sounds.    Pulmonary/Chest: Effort normal and breath sounds normal.   Abdominal: Soft. Bowel sounds are normal.   Musculoskeletal: Normal range of motion.   Neurological: She is alert and oriented to person, place, and time. She has normal reflexes.   Skin: Skin is warm and dry.   Psychiatric: Her behavior is normal. Judgment and thought content normal.   anxious       Labs:        Invalid input(s): [id number]      Recent Labs      02/10/17   033   SODIUM  133*   POTASSIUM  4.2   CHLORIDE  96   CO2  28   BUN  <5*   CREATININE  0.39*   CALCIUM  9.0     Recent Labs      02/10/17   033   GLUCOSE  105*     Recent Labs      02/10/17   0335   RBC  3.44*   HEMOGLOBIN  11.8*   HEMATOCRIT  34.4*   PLATELETCT  333     Recent Labs      02/10/17   0335   WBC  7.4   NEUTSPOLYS  69.70   LYMPHOCYTES  14.00*   MONOCYTES  13.30   EOSINOPHILS  1.90   BASOPHILS  0.40           Hemodynamics:  Temp (24hrs), Av.7 °C (98 °F), Min:36.6 °C (97.8 °F), Max:36.8 °C (98.2 °F)  Temperature: 36.8 °C (98.2 °F)  Pulse  Av  Min: 65  Max: 94   Blood Pressure: 119/71 mmHg      Respiratory:    Respiration: 18, Pulse Oximetry: 97 %        RUL Breath Sounds: Clear, RML Breath Sounds: Clear, RLL Breath Sounds: Diminished, JOSEPHINE Breath Sounds: Clear, LLL Breath Sounds: Diminished  Fluids:  No intake or output data in the 24 hours ending 02/12/17 0929     GI/Nutrition:  Orders Placed This Encounter   Procedures   • DIET ORDER     Standing Status: Standing      Number of Occurrences: 1      Standing Expiration Date:      Order Specific Question:  Diet:     Answer:  Regular [1]     Order Specific Question:  Miscellaneous modifications:     Answer:  Vegetarian [13]     Medical Decision Making, by Problem:  Active Hospital Problems    Diagnosis   • Constipation due to opioid therapy [K59.03, T40.2X5A]  - resolved with bowel regimen   - monitoring      • Chronic hyponatremia [E87.1]  - improving with IVFs; no neuro issues     • Opiate dependence (CMS-[facility]) [F11.20]  - encourage cessation due to complications  - pt not agreeable at this time     • Seizure disorder (CMS-[facility]) [G40.909]  - no seizure activity  - was due to bzd withdrawals   - cont monitoring; no meds necessary  -    • Scoliosis [M41.9]  - PT/OT  - pt refusing SNF     • FTT (failure to thrive) in adult [R62.7]  - cont to encourage PO intake   - refusing SNF  - SW recs appreciated     • Insomnia [G47.00]/Anxiety  - cont outpt Restoril; monitor       DISPO:  Patient medically safe for discharge at this time, but safety is a significant concern at this time.  SW attempted to f/u with neighbor in hopes that they will be able to pick her up and take care of her but no answer from the neighbors at this time.      EKG reviewed, Labs reviewed, Medications reviewed and Radiology images reviewed  Alcantar catheter: No Alcantar      DVT Prophylaxis: Enoxaparin (Lovenox)        Assessed for rehab: Patient was assess for and/or received rehabilitation services during this hospitalization

## 2017-02-12 NOTE — PROGRESS NOTES
2/11/17 1900    Report taken, assumed care of Pt. Pt c/o chronic pain throughout body. She is on a schedule with her pain meds and will be medicated when next available. This is tolerable for her. Reviewed POC for the shift and all questions answered. Call light and possessions within reach. Pt denies needs at this time. Will continue to monitor.

## 2017-02-12 NOTE — DISCHARGE PLANNING
Care Transition Team Assessment    Patient discussed in morning rounds.  Dr. Call reports that this patient is medically cleared for discharge however we need appropriate d/c plan. Patient refusing most treatment/therapy.  Patient just continues to say that she is going to go home and is unwilling to look at any other options.  SS will continue to monitor and assist in an attempt to come up with a discharge plan.     Information Source  Orientation : Oriented x 4  Information Given By: Patient  Informant's Name: Patient and friend Patsy  Who is responsible for making decisions for patient? : Patient    Readmission Evaluation  Is this a readmission?: Yes - unplanned readmission  Why do you think you were readmitted?: [facility] reported home inhabitable and would not leave patient at home.     Elopement Risk  Legal Hold: No  Ambulatory or Self Mobile in Wheelchair: No-Not an Elopement Risk  Elopement Risk: Not at Risk for Elopement    Interdisciplinary Discharge Planning  Does Admitting Nurse Feel This Could be a Complex Discharge?: Yes  Primary Care Physician: Dr. Del Rosario  Lives with - Patient's Self Care Capacity: Alone and Unable to Care For Self, Other (Comments)  Support Systems: None  Housing / Facility: [address]  Do You Take your Prescribed Medications Regularly: Yes  Able to Return to Previous ADL's: No  Mobility Issues: Yes  Prior Services: Intermittent Physical Support for ADL Per Service  Assistance Needed: Yes  Durable Medical Equipment: Walker, Commode    Discharge Preparedness  What is your plan after discharge?: Home with help  What are your discharge supports?: Other (comment) (Pt has a neighbor, Viv who is not willing to help anymore)  Prior Functional Level: Total Assist  Difficulity with ADLs: Bathing, Dressing, Toileting, Walking  Difficulty with ADLs Comment: Patient requires assistance.   Difficulity with IADLs: Cooking, Driving, Laundry, Shopping  Difficulity with IADL Comments: Patient  requires assistance.     Functional Assesment  Prior Functional Level: Total Assist    Finances  Financial Barriers to Discharge: Yes  Prescription Coverage: No  Prescription Coverage Comments: Pending [place] Med[employer]    Vision / Hearing Impairment  Vision Impairment : No  Hearing Impairment : No    Values / Beliefs / Concerns  Values / Beliefs Concerns : No    Advance Directive  Advance Directive?: None  Advance Directive offered?: AD Booklet refused    Domestic Abuse  Have you ever been the victim of abuse or violence?: No    Psychological Assessment  History of Substance Abuse: Alcohol, Prescription opioids    Discharge Risks or Barriers  Discharge risks or barriers?: Transportation, Lives alone, no community support, Complex medical needs, Non-adherence to medication or treatment  Patient risk factors: Vulnerable adult, Complex medical needs, Lack of outside supports, Lives alone and no community support, Noncompliance, Readmission

## 2017-02-13 PROCEDURE — 700111 HCHG RX REV CODE 636 W/ 250 OVERRIDE (IP): Performed by: INTERNAL MEDICINE

## 2017-02-13 PROCEDURE — 700112 HCHG RX REV CODE 229: Performed by: INTERNAL MEDICINE

## 2017-02-13 PROCEDURE — 99225 PR SUBSEQUENT OBSERVATION CARE,LEVEL II: CPT | Performed by: INTERNAL MEDICINE

## 2017-02-13 PROCEDURE — A9270 NON-COVERED ITEM OR SERVICE: HCPCS | Performed by: INTERNAL MEDICINE

## 2017-02-13 PROCEDURE — 700102 HCHG RX REV CODE 250 W/ 637 OVERRIDE(OP): Performed by: INTERNAL MEDICINE

## 2017-02-13 PROCEDURE — G0378 HOSPITAL OBSERVATION PER HR: HCPCS

## 2017-02-13 RX ADMIN — ENOXAPARIN SODIUM 30 MG: 100 INJECTION SUBCUTANEOUS at 08:18

## 2017-02-13 RX ADMIN — ONDANSETRON 4 MG: 4 TABLET, ORALLY DISINTEGRATING ORAL at 10:27

## 2017-02-13 RX ADMIN — TEMAZEPAM 15 MG: 15 CAPSULE ORAL at 21:06

## 2017-02-13 RX ADMIN — SENNOSIDES AND DOCUSATE SODIUM 1 TABLET: 8.6; 5 TABLET ORAL at 03:58

## 2017-02-13 RX ADMIN — HYDROCODONE BITARTRATE AND ACETAMINOPHEN 1 TABLET: 10; 325 TABLET ORAL at 12:42

## 2017-02-13 RX ADMIN — TEMAZEPAM 15 MG: 15 CAPSULE ORAL at 14:49

## 2017-02-13 RX ADMIN — DOCUSATE SODIUM 100 MG: 100 CAPSULE, LIQUID FILLED ORAL at 04:00

## 2017-02-13 RX ADMIN — BISACODYL 10 MG: 10 SUPPOSITORY RECTAL at 10:21

## 2017-02-13 RX ADMIN — LACTULOSE 30 ML: 10 SOLUTION ORAL at 03:59

## 2017-02-13 RX ADMIN — HYDROCODONE BITARTRATE AND ACETAMINOPHEN 1 TABLET: 10; 325 TABLET ORAL at 21:06

## 2017-02-13 RX ADMIN — HYDROCODONE BITARTRATE AND ACETAMINOPHEN 1 TABLET: 10; 325 TABLET ORAL at 16:57

## 2017-02-13 RX ADMIN — HYDROCODONE BITARTRATE AND ACETAMINOPHEN 1 TABLET: 10; 325 TABLET ORAL at 06:47

## 2017-02-13 RX ADMIN — POLYETHYLENE GLYCOL 3350 1 PACKET: 17 POWDER, FOR SOLUTION ORAL at 08:18

## 2017-02-13 RX ADMIN — ONDANSETRON 4 MG: 4 TABLET, ORALLY DISINTEGRATING ORAL at 06:07

## 2017-02-13 RX ADMIN — TEMAZEPAM 15 MG: 15 CAPSULE ORAL at 06:47

## 2017-02-13 RX ADMIN — HYDROCODONE BITARTRATE AND ACETAMINOPHEN 1 TABLET: 10; 325 TABLET ORAL at 02:54

## 2017-02-13 ASSESSMENT — PAIN SCALES - GENERAL
PAINLEVEL_OUTOF10: 7
PAINLEVEL_OUTOF10: 10
PAINLEVEL_OUTOF10: 7
PAINLEVEL_OUTOF10: 6

## 2017-02-13 ASSESSMENT — ENCOUNTER SYMPTOMS
CONSTITUTIONAL NEGATIVE: 1
CARDIOVASCULAR NEGATIVE: 1
EYES NEGATIVE: 1
CONSTIPATION: 0
RESPIRATORY NEGATIVE: 1

## 2017-02-13 NOTE — CARE PLAN
Problem: Discharge Barriers/Planning  Goal: Patient’s continuum of care needs will be met  Intervention: Collaborate with Transitional Care Team and Interdisciplinary Team to meet discharge needs  difficult discharge coordinating care with relatives,needs assistance at home       Problem: Skin Integrity  Goal: Risk for impaired skin integrity will decrease  Intervention: Implement precautions to protect skin integrity in collaboration with the interdisciplinary team  High risk for pressure ulcers due to immobility and incontinence,continue skin protocol turn q 2 hrs,up in chair for meals,frequent yvonne care

## 2017-02-13 NOTE — PROGRESS NOTES
Assisted up into recliner chair encouraged to do as much as she can using the walker to stand,still 2 person assist with transfers.

## 2017-02-13 NOTE — DIETARY
Nutrition services follow-up for pt with FTT consult, also low BMI of 16.13, admit poor po and wt loss.  RD note from 2/9 states pt states she has not had recent wt loss.      Diet order:  Regular vegetarian, soft foods (likes cooked egg whites with meals)  Strawberry boost between meals  Labs and medications reviewed. No new weights.        Review of food preferences with patient.  Pt very happy with the food selection and service.  No new concerns at this time. Intake at dinner 2/12: %.  Pt prefers softer to chew foods.    Plan/Recommendation:  1.  Continue to honor food preferences  2.  Strawberry boost plus with meals  3.  RD to monitor PO intake

## 2017-02-13 NOTE — PROGRESS NOTES
2/12/17    1900    Report taken, assumed care of Pt. Pt has chronic pain and gets pain meds q 4 hours, this is tolerable for her, will administer next dose when available. Reviewed POC for the shift and all questions answered. Call light and possessions within reach. Pt denies needs at this time. Will continue to monitor.      2100    Authorized 2 LD calls to family as pt is trying to locate someone to help her get home safely.

## 2017-02-13 NOTE — PROGRESS NOTES
Received report resting in bed awake,alert and oriented x3,anxious to go home states in contact with her cousins to take her home.Discussed POC,encouraged to get up for meals but refused at this time as she wants her medication to take effect for her leg pains.Planned activity with pt. And verbalized understanding.Instructed to call for any needs call light with in reach.

## 2017-02-13 NOTE — PROGRESS NOTES
Small BM post suppository given,duoderm dressing changed due to stools.Up in chair after for lunch.

## 2017-02-13 NOTE — PROGRESS NOTES
Hospital Medicine Progress Note, Adult, Complex               Author: Arun Call Date & Time created: 2017  11:11 AM     Interval History:  60yo F admitted for FTT as patient was recently discharged from [facility] but unable to take care of herself at home.     No issues or complaints.  Pt wants to go home but has not made arrangements yet.       Review of Systems:  Review of Systems   Constitutional: Negative.    HENT: Negative.    Eyes: Negative.    Respiratory: Negative.    Cardiovascular: Negative.    Gastrointestinal: Negative for constipation.       Physical Exam:  Physical Exam   Constitutional: She is oriented to person, place, and time.   Frail, abdnormal posturing   HENT:   Head: Normocephalic and atraumatic.   Eyes: Conjunctivae and EOM are normal. Pupils are equal, round, and reactive to light.   Neck: Normal range of motion. Neck supple.   Cardiovascular: Normal rate and normal heart sounds.    Pulmonary/Chest: Effort normal and breath sounds normal.   Abdominal: Soft. Bowel sounds are normal.   Musculoskeletal: Normal range of motion.   Neurological: She is alert and oriented to person, place, and time. She has normal reflexes.   Skin: Skin is warm and dry.   Psychiatric: Her behavior is normal. Judgment and thought content normal.   anxious       Labs:        Invalid input(s): [id number]      No results for input(s): SODIUM, POTASSIUM, CHLORIDE, CO2, BUN, CREATININE, MAGNESIUM, PHOSPHORUS, CALCIUM in the last 72 hours.  No results for input(s): ALTSGPT, ASTSGOT, ALKPHOSPHAT, TBILIRUBIN, DBILIRUBIN, GAMMAGT, AMYLASE, LIPASE, ALB, PREALBUMIN, GLUCOSE in the last 72 hours.  No results for input(s): RBC, HEMOGLOBIN, HEMATOCRIT, PLATELETCT, PROTHROMBTM, APTT, INR, IRON, FERRITIN, TOTIRONBC in the last 72 hours.            Hemodynamics:  Temp (24hrs), Av.8 °C (98.2 °F), Min:36.6 °C (97.8 °F), Max:36.9 °C (98.5 °F)  Temperature: 36.6 °C (97.8 °F)  Pulse  Av.7  Min: 65  Max: 94   Blood  Pressure: 110/75 mmHg     Respiratory:    Respiration: 19, Pulse Oximetry: 97 %        RUL Breath Sounds: Clear, RML Breath Sounds: Clear, RLL Breath Sounds: Diminished, JOSEPHINE Breath Sounds: Clear, LLL Breath Sounds: Diminished  Fluids:    Intake/Output Summary (Last 24 hours) at 02/13/17 1111  Last data filed at 02/13/17 0745   Gross per 24 hour   Intake    100 ml   Output      0 ml   Net    100 ml        GI/Nutrition:  Orders Placed This Encounter   Procedures   • DIET ORDER     Standing Status: Standing      Number of Occurrences: 1      Standing Expiration Date:      Order Specific Question:  Diet:     Answer:  Regular [1]     Order Specific Question:  Miscellaneous modifications:     Answer:  Vegetarian [13]     Medical Decision Making, by Problem:  Active Hospital Problems    Diagnosis   • Constipation due to opioid therapy [K59.03, T40.2X5A]  - resolved with bowel regimen   - monitoring      • Chronic hyponatremia [E87.1]  - improving with IVFs; no neuro issues     • Opiate dependence (CMS-[facility]) [F11.20]  - encourage cessation due to complications  - pt not agreeable at this time     • Seizure disorder (CMS-HCC) [G40.909]  - no seizure activity  - was due to bzd withdrawals   - cont monitoring; no meds necessary  -    • Scoliosis [M41.9]  - PT/OT  - pt refusing SNF     • FTT (failure to thrive) in adult [R62.7]  - cont to encourage PO intake   - refusing SNF  - SW recs appreciated     • Insomnia [G47.00]/Anxiety  - cont outpt Restoril; monitor       DISPO:  Patient medically safe for discharge at this time, but safety is a significant concern at this time.  Pt arranging to have either neighbor or distant family to pick her up and take care of her.      EKG reviewed, Labs reviewed, Medications reviewed and Radiology images reviewed  Alcantar catheter: No Alcantar      DVT Prophylaxis: Enoxaparin (Lovenox)        Assessed for rehab: Patient was assess for and/or received rehabilitation services during this  hospitalization

## 2017-02-14 PROCEDURE — 700102 HCHG RX REV CODE 250 W/ 637 OVERRIDE(OP): Performed by: INTERNAL MEDICINE

## 2017-02-14 PROCEDURE — 700112 HCHG RX REV CODE 229: Performed by: INTERNAL MEDICINE

## 2017-02-14 PROCEDURE — 700111 HCHG RX REV CODE 636 W/ 250 OVERRIDE (IP): Performed by: INTERNAL MEDICINE

## 2017-02-14 PROCEDURE — 99225 PR SUBSEQUENT OBSERVATION CARE,LEVEL II: CPT | Performed by: INTERNAL MEDICINE

## 2017-02-14 PROCEDURE — G0378 HOSPITAL OBSERVATION PER HR: HCPCS

## 2017-02-14 PROCEDURE — A9270 NON-COVERED ITEM OR SERVICE: HCPCS | Performed by: INTERNAL MEDICINE

## 2017-02-14 RX ADMIN — HYDROCODONE BITARTRATE AND ACETAMINOPHEN 1 TABLET: 10; 325 TABLET ORAL at 01:00

## 2017-02-14 RX ADMIN — BISACODYL 10 MG: 10 SUPPOSITORY RECTAL at 10:17

## 2017-02-14 RX ADMIN — POLYETHYLENE GLYCOL 3350 1 PACKET: 17 POWDER, FOR SOLUTION ORAL at 13:50

## 2017-02-14 RX ADMIN — HYDROCODONE BITARTRATE AND ACETAMINOPHEN 1 TABLET: 10; 325 TABLET ORAL at 13:49

## 2017-02-14 RX ADMIN — TEMAZEPAM 15 MG: 15 CAPSULE ORAL at 22:13

## 2017-02-14 RX ADMIN — DOCUSATE SODIUM 100 MG: 100 CAPSULE, LIQUID FILLED ORAL at 09:25

## 2017-02-14 RX ADMIN — HYDROCODONE BITARTRATE AND ACETAMINOPHEN 1 TABLET: 10; 325 TABLET ORAL at 17:53

## 2017-02-14 RX ADMIN — LACTULOSE 30 ML: 10 SOLUTION ORAL at 07:40

## 2017-02-14 RX ADMIN — HYDROCODONE BITARTRATE AND ACETAMINOPHEN 1 TABLET: 10; 325 TABLET ORAL at 22:13

## 2017-02-14 RX ADMIN — ENOXAPARIN SODIUM 30 MG: 100 INJECTION SUBCUTANEOUS at 09:25

## 2017-02-14 RX ADMIN — HYDROCODONE BITARTRATE AND ACETAMINOPHEN 1 TABLET: 10; 325 TABLET ORAL at 09:25

## 2017-02-14 RX ADMIN — HYDROCODONE BITARTRATE AND ACETAMINOPHEN 1 TABLET: 10; 325 TABLET ORAL at 05:14

## 2017-02-14 RX ADMIN — ONDANSETRON 4 MG: 4 TABLET, ORALLY DISINTEGRATING ORAL at 17:53

## 2017-02-14 RX ADMIN — TEMAZEPAM 15 MG: 15 CAPSULE ORAL at 09:31

## 2017-02-14 RX ADMIN — TEMAZEPAM 15 MG: 15 CAPSULE ORAL at 15:09

## 2017-02-14 RX ADMIN — ONDANSETRON 4 MG: 4 TABLET, ORALLY DISINTEGRATING ORAL at 05:47

## 2017-02-14 ASSESSMENT — ENCOUNTER SYMPTOMS
EYES NEGATIVE: 1
PALPITATIONS: 0
MUSCULOSKELETAL NEGATIVE: 1
NAUSEA: 0
RESPIRATORY NEGATIVE: 1
DIZZINESS: 0
CONSTIPATION: 0
PSYCHIATRIC NEGATIVE: 1
BLURRED VISION: 0
WEAKNESS: 0
CHILLS: 0
HEADACHES: 0
MYALGIAS: 0
TINGLING: 0
SHORTNESS OF BREATH: 0
CARDIOVASCULAR NEGATIVE: 1
FEVER: 0
GASTROINTESTINAL NEGATIVE: 1
COUGH: 0
CONSTITUTIONAL NEGATIVE: 1
DEPRESSION: 0
DIARRHEA: 0
FOCAL WEAKNESS: 1

## 2017-02-14 ASSESSMENT — PAIN SCALES - GENERAL
PAINLEVEL_OUTOF10: 10

## 2017-02-14 NOTE — PROGRESS NOTES
Received report,resting in bed alert and oriented x4,c/o abdominal pain and no BM,hyperactive bowel sounds,soft but slightly distended.Discussed POC agreed to take her Lactulose and discussed about suppository post breakfast.Assisted up to chair for breakfast,incontinent of urine.1 person assist to stand pivot and transfer.Instructed to call for any needs call light with in reach.

## 2017-02-14 NOTE — DISCHARGE PLANNING
Patient was discussed in morning rounds.  Patient reportedly has gotten a hold of a cousin who is suppose to be coming from [place] today to pick her up. SW informed nursing to inform this SW if/when he arrives to assist with d/c.

## 2017-02-14 NOTE — PROGRESS NOTES
1900 Received report from day RN. Pt was sitting on recliner and was assisted x2 person back to bed. Pt is incontinen of both bowels and urine, w/ a hydrocolloid thin to her sacrococcyx area.    2000 Noted pt to be on phone w/ different callers, per pt, she is trying to have her house prepared so that she can go home.     0003 Pt resting comfortably in bed., no s/sx of distress noted. wctm

## 2017-02-14 NOTE — DISCHARGE PLANNING
WELLINGTON was informed by Floor Rn that this patient has a visister from [facility], ([employer] [phone]) [employer] company.  SW met with both this patient and Germaine at patient's bedside.  WELLINGTON notified that this patient is hiring assistance from this [employer] company.  Germaine stated that she would have someone coming in the morning to assist this patient in getting home and then providing continued intermittent care.  Germaine is going to call this SW in the morning and let me know when they will be coming to get this patient.

## 2017-02-14 NOTE — CARE PLAN
Problem: Safety  Goal: Will remain free from injury  Outcome: PROGRESSING AS EXPECTED  Pt in bed, bed locked and low, controls on. Call light button within reach.   Pt uses call light for needs.         Problem: Discharge Barriers/Planning  Goal: Patient’s continuum of care needs will be met  Outcome: PROGRESSING AS EXPECTED  Pt refuses SNF  Noted to be contacting several people on the phone so that she can go back home.   Per pt, her cousin from [place] will come tomorrow.    Problem: Mobility  Goal: Risk for activity intolerance will decrease  Outcome: PROGRESSING AS EXPECTED  Ambulation encouraged to patient facility gastric motility

## 2017-02-14 NOTE — PROGRESS NOTES
Hospital Medicine Progress Note, Adult, Complex               Author: Mickie OLEARY Date & Time created: 2/14/2017  7:55 AM     Interval History:  62yo F admitted for FTT as patient was recently discharged from [facility] but unable to take care of herself at home. [facility] took pt to her home and took her back to [facility] d/t severe home conditions and unable to care for self.    No complaints.  Pt wants to go home but has not made arrangements yet. Family not willing to come care for pt       Review of Systems:  Review of Systems   Constitutional: Negative.  Negative for fever, chills and malaise/fatigue.   HENT: Negative.  Negative for congestion.         Partial dentures- says were lost at [facility]   Eyes: Negative.  Negative for blurred vision.   Respiratory: Negative.  Negative for cough and shortness of breath.    Cardiovascular: Negative.  Negative for chest pain, palpitations and leg swelling.   Gastrointestinal: Negative.  Negative for nausea, diarrhea and constipation.   Genitourinary: Negative.  Negative for dysuria.   Musculoskeletal: Negative.  Negative for myalgias and joint pain.   Skin: Negative.  Negative for itching and rash.   Neurological: Positive for focal weakness (BLE- chronic). Negative for dizziness, tingling, weakness and headaches.   Endo/Heme/Allergies: Negative.    Psychiatric/Behavioral: Negative.  Negative for depression and suicidal ideas.       Physical Exam:  Physical Exam   Constitutional: She is oriented to person, place, and time.   Frail, abnormal posturing   HENT:   Head: Normocephalic and atraumatic.   Eyes: Conjunctivae are normal. Pupils are equal, round, and reactive to light.   Neck: Normal range of motion. Neck supple.   Cardiovascular: Normal rate and normal heart sounds.    Pulmonary/Chest: Effort normal and breath sounds normal.   Abdominal: Soft. Bowel sounds are normal.   Musculoskeletal:   gen weakness both BUE and BLE   Neurological: She is alert and oriented to person,  place, and time.   Skin: Skin is warm and dry.   Psychiatric: Her behavior is normal. Judgment and thought content normal.   anxious       Labs:        Invalid input(s): [id number]      No results for input(s): SODIUM, POTASSIUM, CHLORIDE, CO2, BUN, CREATININE, MAGNESIUM, PHOSPHORUS, CALCIUM in the last 72 hours.  No results for input(s): ALTSGPT, ASTSGOT, ALKPHOSPHAT, TBILIRUBIN, DBILIRUBIN, GAMMAGT, AMYLASE, LIPASE, ALB, PREALBUMIN, GLUCOSE in the last 72 hours.  No results for input(s): RBC, HEMOGLOBIN, HEMATOCRIT, PLATELETCT, PROTHROMBTM, APTT, INR, IRON, FERRITIN, TOTIRONBC in the last 72 hours.            Hemodynamics:  Temp (24hrs), Av.7 °C (98 °F), Min:36.4 °C (97.5 °F), Max:36.9 °C (98.4 °F)  Temperature: 36.9 °C (98.4 °F)  Pulse  Av.9  Min: 65  Max: 94   Blood Pressure: 109/67 mmHg     Respiratory:    Respiration: 17, Pulse Oximetry: 97 %        RUL Breath Sounds: Clear, RML Breath Sounds: Clear, RLL Breath Sounds: Diminished, JOSEPHINE Breath Sounds: Clear, LLL Breath Sounds: Diminished  Fluids:    Intake/Output Summary (Last 24 hours) at 17 0755  Last data filed at 17 0500   Gross per 24 hour   Intake    320 ml   Output      0 ml   Net    320 ml        GI/Nutrition:  Orders Placed This Encounter   Procedures   • DIET ORDER     Standing Status: Standing      Number of Occurrences: 1      Standing Expiration Date:      Order Specific Question:  Diet:     Answer:  Regular [1]     Order Specific Question:  Miscellaneous modifications:     Answer:  Vegetarian [13]     Medical Decision Making, by Problem:  Active Hospital Problems    Diagnosis   • Constipation due to opioid therapy [K59.03, T40.2X5A]  - resolved with bowel regimen   - monitoring      • Chronic hyponatremia [E87.1]  - improved with IVFs; no neuro issues     • Opiate dependence (CMS-HCC) [F11.20]  - encourage cessation due to complications  - pt not agreeable at this time     • Seizure disorder (CMS-HCC) [G40.909]  - no  seizure activity  - was due to bzd withdrawals   - cont monitoring; no meds necessary   • Scoliosis [M41.9]  - PT/OT  - pt refusing SNF- no insurance coverage either     • FTT (failure to thrive) in adult [R62.7]  - cont to encourage PO intake   - refusing SNF/HH- no ins coverage  - SW recs appreciated     • Insomnia [G47.00]/Anxiety  - cont outpt Restoril; monitor       DISPO:  Patient medically safe for discharge at this time, but not a safe discharge as unable to fully care for self and doesn't have friends/family/sig enough caregiving in place at home.  Pt arranging to have caregivers hired to come in more often. Appreciate CM/SW assistance     EKG reviewed, Labs reviewed, Medications reviewed and Radiology images reviewed  Alcantar catheter: No Alcantar      DVT Prophylaxis: Enoxaparin (Lovenox)        Assessed for rehab: Patient was assess for and/or received rehabilitation services during this hospitalization

## 2017-02-14 NOTE — CARE PLAN
Problem: Discharge Barriers/Planning  Goal: Patient’s continuum of care needs will be met  Intervention: Collaborate with Transitional Care Team and Interdisciplinary Team to meet discharge needs  Difficult discharge due to home situation needs assistance at home-care team aware SW working with pt.for other alternatives      Problem: Skin Integrity  Goal: Risk for impaired skin integrity will decrease  Outcome: PROGRESSING SLOWER THAN EXPECTED  Bowel and void training,up to bedside commode every 2 hrs-continent thus far

## 2017-02-14 NOTE — DISCHARGE PLANNING
WELLINGTON notes that patient is showing at pending [place] medicaid. SW will monitor status at Medicaid approval could assist in discharge planning.

## 2017-02-14 NOTE — DISCHARGE PLANNING
Patient discussed in morning rounds.  Patient continues to be medically cleared to discharge when arrangements can be made.  This SW met with this patient at bedside.  She stated that she is in the process of hiring [facility] and she is waiting to hear back from Germaine [phone]).  She stated that once they can get caregivers lined up they will be coming to pick her up and take her home today.  She also stated that her cousin Amalia from [place], [place] should be her tomorrow if the [facility] doesn't work out today.  She stated she will only go home.  Declined [facility] or group home again.

## 2017-02-15 PROCEDURE — 99225 PR SUBSEQUENT OBSERVATION CARE,LEVEL II: CPT | Performed by: INTERNAL MEDICINE

## 2017-02-15 PROCEDURE — 700112 HCHG RX REV CODE 229: Performed by: INTERNAL MEDICINE

## 2017-02-15 PROCEDURE — A9270 NON-COVERED ITEM OR SERVICE: HCPCS | Performed by: INTERNAL MEDICINE

## 2017-02-15 PROCEDURE — 700102 HCHG RX REV CODE 250 W/ 637 OVERRIDE(OP): Performed by: INTERNAL MEDICINE

## 2017-02-15 PROCEDURE — G0378 HOSPITAL OBSERVATION PER HR: HCPCS

## 2017-02-15 PROCEDURE — 700111 HCHG RX REV CODE 636 W/ 250 OVERRIDE (IP): Performed by: INTERNAL MEDICINE

## 2017-02-15 RX ADMIN — HYDROCODONE BITARTRATE AND ACETAMINOPHEN 1 TABLET: 10; 325 TABLET ORAL at 06:25

## 2017-02-15 RX ADMIN — HYDROCODONE BITARTRATE AND ACETAMINOPHEN 1 TABLET: 10; 325 TABLET ORAL at 02:20

## 2017-02-15 RX ADMIN — BISACODYL 10 MG: 10 SUPPOSITORY RECTAL at 08:55

## 2017-02-15 RX ADMIN — POLYETHYLENE GLYCOL 3350 1 PACKET: 17 POWDER, FOR SOLUTION ORAL at 08:55

## 2017-02-15 RX ADMIN — HYDROCODONE BITARTRATE AND ACETAMINOPHEN 1 TABLET: 10; 325 TABLET ORAL at 22:29

## 2017-02-15 RX ADMIN — ENOXAPARIN SODIUM 30 MG: 100 INJECTION SUBCUTANEOUS at 08:55

## 2017-02-15 RX ADMIN — DOCUSATE SODIUM 100 MG: 100 CAPSULE, LIQUID FILLED ORAL at 08:55

## 2017-02-15 RX ADMIN — HYDROCODONE BITARTRATE AND ACETAMINOPHEN 1 TABLET: 10; 325 TABLET ORAL at 10:30

## 2017-02-15 RX ADMIN — TEMAZEPAM 15 MG: 15 CAPSULE ORAL at 14:02

## 2017-02-15 RX ADMIN — ONDANSETRON 4 MG: 4 TABLET, ORALLY DISINTEGRATING ORAL at 05:19

## 2017-02-15 RX ADMIN — HYDROCODONE BITARTRATE AND ACETAMINOPHEN 1 TABLET: 10; 325 TABLET ORAL at 14:32

## 2017-02-15 RX ADMIN — TEMAZEPAM 15 MG: 15 CAPSULE ORAL at 06:25

## 2017-02-15 RX ADMIN — LACTULOSE 30 ML: 10 SOLUTION ORAL at 13:08

## 2017-02-15 RX ADMIN — TEMAZEPAM 15 MG: 15 CAPSULE ORAL at 22:13

## 2017-02-15 RX ADMIN — HYDROCODONE BITARTRATE AND ACETAMINOPHEN 1 TABLET: 10; 325 TABLET ORAL at 18:40

## 2017-02-15 ASSESSMENT — ENCOUNTER SYMPTOMS
CONSTITUTIONAL NEGATIVE: 1
MUSCULOSKELETAL NEGATIVE: 1
HEADACHES: 0
COUGH: 0
DIARRHEA: 0
PSYCHIATRIC NEGATIVE: 1
GASTROINTESTINAL NEGATIVE: 1
CARDIOVASCULAR NEGATIVE: 1
DEPRESSION: 0
MYALGIAS: 0
FEVER: 0
TINGLING: 0
CHILLS: 0
WEAKNESS: 0
NAUSEA: 0
BLURRED VISION: 0
EYES NEGATIVE: 1
CONSTIPATION: 0
RESPIRATORY NEGATIVE: 1
DIZZINESS: 0
PALPITATIONS: 0
FOCAL WEAKNESS: 1
SHORTNESS OF BREATH: 0

## 2017-02-15 ASSESSMENT — PAIN SCALES - GENERAL
PAINLEVEL_OUTOF10: 8
PAINLEVEL_OUTOF10: 8
PAINLEVEL_OUTOF10: 10

## 2017-02-15 NOTE — PROGRESS NOTES
Call from [facility], pt had called and left voicemail with them regarding picking her up from hospital and taking her home. Per [facility], no referrals or consults in for pt to be picked up and taken home. This RN to updated Ada RN when back from lunch.

## 2017-02-15 NOTE — PROGRESS NOTES
Pt resting in bed no signs of distress. VSS. Discussed POC, assessment complete, pt complained of pain in back, repositioned pt and medicated per MAR.  Educated on fall risks and to use call light before getting out of bed, pt verbalized understanding. Fall precautions in place, call light in reach will monitor.

## 2017-02-15 NOTE — CARE PLAN
Problem: Safety  Goal: Will remain free from falls  Outcome: PROGRESSING AS EXPECTED  Pt educated on fall risks and to call staff before getting out of bed. Fall precautions in place, hourly rounding implemented, bed alarm on, call light in reach.    Problem: Bowel/Gastric:  Goal: Normal bowel function is maintained or improved  Outcome: PROGRESSING AS EXPECTED  Last BM 2/14/17, stool softener refused. Educated on importance of maintaining regular bowel function, encouraged pt to increase PO intake of fluids, pt verbalized understanding.

## 2017-02-15 NOTE — DISCHARGE PLANNING
SW called Savanna with [facility] to see if she would be willing to come back out and visit this patient and see if she would be more open to any of her services now that she has been in the hospital for sometime.  SW will await a call back.

## 2017-02-15 NOTE — DISCHARGE PLANNING
This SW and Savanna from [facility] met with this patient at bedside. [facility] will be providing this patient with a list of house keepers and PCA services.  Patient declined all other services (IE., group care, repayee services,Medicaid).

## 2017-02-15 NOTE — PROGRESS NOTES
Hospital Medicine Progress Note, Adult, Complex               Author: Mickie OLEARY Date & Time created: 2/15/2017  3:13 PM     Interval History:  62yo F admitted for FTT as patient was recently discharged from [facility] but unable to take care of herself at home. [facility] took pt to her home and took her back to [facility] d/t severe home conditions and unable to care for self.    2/14- No complaints.  Pt wants to go home but has not made arrangements yet. Family not willing to come care for pt       2/15- No complaints again today- still working on setting up someone to care for her at home- caregiver company refused due to unsafe home conditions. CM to call [facility] today to eval home. Still working on medicaid coverage    Review of Systems:  Review of Systems   Constitutional: Negative.  Negative for fever, chills and malaise/fatigue.   HENT: Negative.  Negative for congestion.         Partial dentures- says were lost at [facility]   Eyes: Negative.  Negative for blurred vision.   Respiratory: Negative.  Negative for cough and shortness of breath.    Cardiovascular: Negative.  Negative for chest pain, palpitations and leg swelling.   Gastrointestinal: Negative.  Negative for nausea, diarrhea and constipation.   Genitourinary: Negative.  Negative for dysuria.   Musculoskeletal: Negative.  Negative for myalgias and joint pain.   Skin: Negative.  Negative for itching and rash.   Neurological: Positive for focal weakness (BLE- chronic). Negative for dizziness, tingling, weakness and headaches.   Endo/Heme/Allergies: Negative.    Psychiatric/Behavioral: Negative.  Negative for depression and suicidal ideas.       Physical Exam:  Physical Exam   Constitutional: She is oriented to person, place, and time.   Frail, abnormal posturing   HENT:   Head: Normocephalic and atraumatic.   Eyes: Conjunctivae are normal. Pupils are equal, round, and reactive to light.   Neck: Normal range of motion. Neck supple.   Cardiovascular: Normal rate and  normal heart sounds.    Pulmonary/Chest: Effort normal and breath sounds normal.   Abdominal: Soft. Bowel sounds are normal.   Musculoskeletal:   gen weakness both BUE and BLE   Neurological: She is alert and oriented to person, place, and time.   Skin: Skin is warm and dry.   Psychiatric: Her behavior is normal. Judgment and thought content normal.   anxious       Labs:        Invalid input(s): [id number]      No results for input(s): SODIUM, POTASSIUM, CHLORIDE, CO2, BUN, CREATININE, MAGNESIUM, PHOSPHORUS, CALCIUM in the last 72 hours.  No results for input(s): ALTSGPT, ASTSGOT, ALKPHOSPHAT, TBILIRUBIN, DBILIRUBIN, GAMMAGT, AMYLASE, LIPASE, ALB, PREALBUMIN, GLUCOSE in the last 72 hours.  No results for input(s): RBC, HEMOGLOBIN, HEMATOCRIT, PLATELETCT, PROTHROMBTM, APTT, INR, IRON, FERRITIN, TOTIRONBC in the last 72 hours.            Hemodynamics:  Temp (24hrs), Av.5 °C (97.7 °F), Min:36.3 °C (97.3 °F), Max:36.6 °C (97.9 °F)  Temperature: 36.6 °C (97.9 °F)  Pulse  Av.9  Min: 64  Max: 94   Blood Pressure: 117/72 mmHg     Respiratory:    Respiration: 18, Pulse Oximetry: 91 %        RUL Breath Sounds: Clear, RML Breath Sounds: Clear, RLL Breath Sounds: Diminished, JOSEPHINE Breath Sounds: Clear, LLL Breath Sounds: Diminished  Fluids:    Intake/Output Summary (Last 24 hours) at 02/15/17 1513  Last data filed at 02/15/17 0600   Gross per 24 hour   Intake    400 ml   Output      0 ml   Net    400 ml        GI/Nutrition:  Orders Placed This Encounter   Procedures   • DIET ORDER     Standing Status: Standing      Number of Occurrences: 1      Standing Expiration Date:      Order Specific Question:  Diet:     Answer:  Regular [1]     Order Specific Question:  Miscellaneous modifications:     Answer:  Vegetarian [13]     Medical Decision Making, by Problem:  Active Hospital Problems    Diagnosis   • Constipation due to opioid therapy [K59.03, T40.2X5A]  - resolved with bowel regimen   - monitoring      • Chronic  hyponatremia [E87.1]  - improved with IVFs; no neuro issues     • Opiate dependence (CMS-HCC) [F11.20]  - encourage cessation due to complications  - pt not agreeable at this time     • Seizure disorder (CMS-HCC) [G40.909]  - no seizure activity  - was due to bzd withdrawals   - cont monitoring; no meds necessary   • Scoliosis [M41.9]  - PT/OT  - pt refusing SNF- no insurance coverage either     • FTT (failure to thrive) in adult [R62.7]  - cont to encourage PO intake   - refusing SNF/HH- no ins coverage  - SW recs appreciated     • Insomnia [G47.00]/Anxiety  - cont outpt Restoril; monitor       DISPO:  Patient medically safe for discharge at this time, but not a safe discharge as unable to fully care for self and doesn't have friends/family/sig enough caregiving in place at home.  Pt arranging to have caregivers hired to come in more often- still looking, [email or web address] company refused. Appreciate CM/SW assistance     EKG reviewed, Labs reviewed, Medications reviewed and Radiology images reviewed  Alcantar catheter: No Alcantar      DVT Prophylaxis: Enoxaparin (Lovenox)        Assessed for rehab: Patient was assess for and/or received rehabilitation services during this hospitalization

## 2017-02-15 NOTE — CARE PLAN
Problem: Discharge Barriers/Planning  Goal: Patient’s continuum of care needs will be met  Intervention: Collaborate with Transitional Care Team and Interdisciplinary Team to meet discharge needs  Continue to be a difficult discharge due to unsafe home environment-pending Medicaid.WELLINGTON working with pt.      Problem: Skin Integrity  Goal: Risk for impaired skin integrity will decrease  Intervention: Assess risk factors for impaired skin integrity and/or pressure ulcers  Continue skin protocol,encourage to use BSC q 2 hrs to avoid incontinence,turn q 2 hrs while in bed,up in chair for meals

## 2017-02-15 NOTE — DISCHARGE PLANNING
SW received call from Germaine with [facility] [phone].  She stated that she has spoken with the neighbor and that they are unable to meet this patient's needs and are not going to be able to pick her up as a client.  She stated that she was going to call the patient next and let her know.

## 2017-02-15 NOTE — PROGRESS NOTES
Received report resting in bed awake,anxious to go home,have been calling on the phone,explained that its too early to know at this point and will let her know as soon as SW has information from Caregiver agency.Discussed POC and verbalized understanding.Will continue to mobilize up in chair for meals and use BSC during the day.

## 2017-02-16 LAB
ANION GAP SERPL CALC-SCNC: 7 MMOL/L (ref 0–11.9)
BASOPHILS # BLD AUTO: 0.6 % (ref 0–1.8)
BASOPHILS # BLD: 0.03 K/UL (ref 0–0.12)
BUN SERPL-MCNC: 7 MG/DL (ref 8–22)
CALCIUM SERPL-MCNC: 9 MG/DL (ref 8.4–10.2)
CHLORIDE SERPL-SCNC: 104 MMOL/L (ref 96–112)
CO2 SERPL-SCNC: 25 MMOL/L (ref 20–33)
CREAT SERPL-MCNC: 0.3 MG/DL (ref 0.5–1.4)
EOSINOPHIL # BLD AUTO: 0.14 K/UL (ref 0–0.51)
EOSINOPHIL NFR BLD: 2.9 % (ref 0–6.9)
ERYTHROCYTE [DISTWIDTH] IN BLOOD BY AUTOMATED COUNT: 46.8 FL (ref 35.9–50)
GFR SERPL CREATININE-BSD FRML MDRD: >60 ML/MIN/1.73 M 2
GLUCOSE SERPL-MCNC: 86 MG/DL (ref 65–99)
HCT VFR BLD AUTO: 31.4 % (ref 37–47)
HGB BLD-MCNC: 10.9 G/DL (ref 12–16)
IMM GRANULOCYTES # BLD AUTO: 0.01 K/UL (ref 0–0.11)
IMM GRANULOCYTES NFR BLD AUTO: 0.2 % (ref 0–0.9)
LYMPHOCYTES # BLD AUTO: 1.12 K/UL (ref 1–4.8)
LYMPHOCYTES NFR BLD: 23.3 % (ref 22–41)
MCH RBC QN AUTO: 33.9 PG (ref 27–33)
MCHC RBC AUTO-ENTMCNC: 34.7 G/DL (ref 33.6–35)
MCV RBC AUTO: 97.5 FL (ref 81.4–97.8)
MONOCYTES # BLD AUTO: 0.56 K/UL (ref 0–0.85)
MONOCYTES NFR BLD AUTO: 11.6 % (ref 0–13.4)
NEUTROPHILS # BLD AUTO: 2.95 K/UL (ref 2–7.15)
NEUTROPHILS NFR BLD: 61.4 % (ref 44–72)
NRBC # BLD AUTO: 0 K/UL
NRBC BLD AUTO-RTO: 0 /100 WBC
PLATELET # BLD AUTO: 345 K/UL (ref 164–446)
PMV BLD AUTO: 8.7 FL (ref 9–12.9)
POTASSIUM SERPL-SCNC: 3.5 MMOL/L (ref 3.6–5.5)
RBC # BLD AUTO: 3.22 M/UL (ref 4.2–5.4)
SODIUM SERPL-SCNC: 136 MMOL/L (ref 135–145)
WBC # BLD AUTO: 4.8 K/UL (ref 4.8–10.8)

## 2017-02-16 PROCEDURE — 700112 HCHG RX REV CODE 229: Performed by: INTERNAL MEDICINE

## 2017-02-16 PROCEDURE — 80048 BASIC METABOLIC PNL TOTAL CA: CPT

## 2017-02-16 PROCEDURE — G0378 HOSPITAL OBSERVATION PER HR: HCPCS

## 2017-02-16 PROCEDURE — A9270 NON-COVERED ITEM OR SERVICE: HCPCS | Performed by: INTERNAL MEDICINE

## 2017-02-16 PROCEDURE — 700102 HCHG RX REV CODE 250 W/ 637 OVERRIDE(OP): Performed by: INTERNAL MEDICINE

## 2017-02-16 PROCEDURE — 99225 PR SUBSEQUENT OBSERVATION CARE,LEVEL II: CPT | Performed by: INTERNAL MEDICINE

## 2017-02-16 PROCEDURE — 85025 COMPLETE CBC W/AUTO DIFF WBC: CPT

## 2017-02-16 PROCEDURE — 700111 HCHG RX REV CODE 636 W/ 250 OVERRIDE (IP): Performed by: INTERNAL MEDICINE

## 2017-02-16 PROCEDURE — 36415 COLL VENOUS BLD VENIPUNCTURE: CPT

## 2017-02-16 RX ADMIN — TEMAZEPAM 15 MG: 15 CAPSULE ORAL at 15:58

## 2017-02-16 RX ADMIN — HYDROCODONE BITARTRATE AND ACETAMINOPHEN 1 TABLET: 10; 325 TABLET ORAL at 02:42

## 2017-02-16 RX ADMIN — SENNOSIDES AND DOCUSATE SODIUM 1 TABLET: 8.6; 5 TABLET ORAL at 19:53

## 2017-02-16 RX ADMIN — HYDROCODONE BITARTRATE AND ACETAMINOPHEN 1 TABLET: 10; 325 TABLET ORAL at 15:59

## 2017-02-16 RX ADMIN — POLYETHYLENE GLYCOL 3350 1 PACKET: 17 POWDER, FOR SOLUTION ORAL at 09:37

## 2017-02-16 RX ADMIN — DOCUSATE SODIUM 100 MG: 100 CAPSULE, LIQUID FILLED ORAL at 19:53

## 2017-02-16 RX ADMIN — HYDROCODONE BITARTRATE AND ACETAMINOPHEN 1 TABLET: 10; 325 TABLET ORAL at 11:48

## 2017-02-16 RX ADMIN — HYDROCODONE BITARTRATE AND ACETAMINOPHEN 1 TABLET: 10; 325 TABLET ORAL at 19:53

## 2017-02-16 RX ADMIN — ENOXAPARIN SODIUM 30 MG: 100 INJECTION SUBCUTANEOUS at 09:37

## 2017-02-16 RX ADMIN — TEMAZEPAM 15 MG: 15 CAPSULE ORAL at 19:53

## 2017-02-16 RX ADMIN — HYDROCODONE BITARTRATE AND ACETAMINOPHEN 1 TABLET: 10; 325 TABLET ORAL at 06:32

## 2017-02-16 RX ADMIN — DOCUSATE SODIUM 100 MG: 100 CAPSULE, LIQUID FILLED ORAL at 09:37

## 2017-02-16 RX ADMIN — ONDANSETRON 4 MG: 4 TABLET, ORALLY DISINTEGRATING ORAL at 14:44

## 2017-02-16 RX ADMIN — TEMAZEPAM 15 MG: 15 CAPSULE ORAL at 06:35

## 2017-02-16 RX ADMIN — BISACODYL 10 MG: 10 SUPPOSITORY RECTAL at 09:37

## 2017-02-16 ASSESSMENT — PAIN SCALES - GENERAL
PAINLEVEL_OUTOF10: 0
PAINLEVEL_OUTOF10: 6
PAINLEVEL_OUTOF10: 0
PAINLEVEL_OUTOF10: 8

## 2017-02-16 NOTE — PROGRESS NOTES
Calling to use BSC to void and have BM was only incontinent of [id number] today.Continue to encourage to mobilize and use BSC.

## 2017-02-16 NOTE — PROGRESS NOTES
Hospital Medicine Progress Note, Adult, Complex               Author: Vern DIVYA Mustafalandry Date & Time created: 2/16/2017  3:37 PM   CC: failure to thrive  Interval History:  62 yo woman admitted on 2/8/17 because of failure to thrive after she was recently discharged form [facility].. Pt was unable to take care of herself at home. The nursing staff and  that her house was quite dirty with feces all over the place. She has anxiety and opiod induced constipation.  She was given Miralax, senokot and dulcolax. She takes pain meds for back pain. OT/Pt services were consulted.     She has h/o seizures secondary to Hyponatremia. Na level was 128 on admission.Na level is back to normal today. There was a mild drop in hgb but there was no active bleed.   Crea is low.She has no complaints.     Review of Systems:  ROS   Pertinent positives/negative as mentioned above.     A complete review of systems was done. All other systems were negative.     Physical Exam:  Physical Exam   Constitutional: Well-developed, thin, angry,anxious  HENT: Normocephalic, atraumatic, (-) tonsillopharyngal congestion, (-) tonsillopharyngeal exudate  Eyes: PERRLA, pink conjuctivae, (-) icteric sclerae  Neck: (-) cervical lymphadenopathy, (-) rigidity  Cardiovascular: RRR, (-) murmurs, (-) rubs, (-) gallops, (-) edema  Pulmonary: Equal chest expansion, (+) clear breath sounds, (-) wheezes/rhonchi, (-) crackles/rales  Abdominal: (+) bowel sounds, soft, (+)distension  Musculoskeletal: (-) joint swelling, (-) joint tenderness, (-) joint deformities, (-) muscle tenderness, (-) gross limitation of movement of 4 extremities  Neurologic: Non-focal, moves all 4 extremities, sensory grossly intact  Skin: (-) erythema, (-) warmth, (-) rashes, (-) ulcers, (-) open wounds  Psychiatric: mood/affect WNL, thought content WNL, behavior age appropriate    Labs:        Invalid input(s): [id number]      Recent Labs      02/16/17   0430   SODIUM  136   POTASSIUM   3.5*   CHLORIDE  104   CO2  25   BUN  7*   CREATININE  0.30*   CALCIUM  9.0     Recent Labs      17   0430   GLUCOSE  86     Recent Labs      17   0430   RBC  3.22*   HEMOGLOBIN  10.9*   HEMATOCRIT  31.4*   PLATELETCT  345     Recent Labs      17   0430   WBC  4.8   NEUTSPOLYS  61.40   LYMPHOCYTES  23.30   MONOCYTES  11.60   EOSINOPHILS  2.90   BASOPHILS  0.60           Hemodynamics:  Temp (24hrs), Av.7 °C (98 °F), Min:36.6 °C (97.8 °F), Max:36.8 °C (98.3 °F)  Temperature: 36.8 °C (98.3 °F)  Pulse  Av.4  Min: 64  Max: 94   Blood Pressure: 115/90 mmHg     Respiratory:    Respiration: 18, Pulse Oximetry: 93 %        RUL Breath Sounds: Clear, RML Breath Sounds: Clear, RLL Breath Sounds: Diminished, JOSEPHINE Breath Sounds: Clear, LLL Breath Sounds: Diminished  Fluids:    Intake/Output Summary (Last 24 hours) at 17 1537  Last data filed at 17 0900   Gross per 24 hour   Intake    900 ml   Output    300 ml   Net    600 ml        GI/Nutrition:  Orders Placed This Encounter   Procedures   • DIET ORDER     Standing Status: Standing      Number of Occurrences: 1      Standing Expiration Date:      Order Specific Question:  Diet:     Answer:  Regular [1]     Order Specific Question:  Miscellaneous modifications:     Answer:  Vegetarian [13]     Medical Decision Making, by Problem:  Active Hospital Problems    Diagnosis   • Constipation due to opioid therapy [K59.03, T40.2X5A]  Continue bowel regimen     • Chronic hyponatremia [E87.1]  Better  Na back to normal     • Opiate dependence (CMS-HCC) [F11.20]  Pt refused to cut down or discontinue narcotics     • Seizure disorder (CMS-HCC) [G40.909]  Currently asymptomatic       • Scoliosis [M41.9]  PT/OT     • FTT (failure to thrive) in adult [R62.7]  PT/OT     • Insomnia [G47.00]  restoril       Core Measures    Full code  Lovenox

## 2017-02-16 NOTE — PROGRESS NOTES
Received report from Estela LUNSFORD, assumed pt care. Pt A&Ox4, sitting up in bed. Encouraged use of IS 10x/hr while awake and leg exercises while in bed. Pt taught to inform nurse if experiencing pain above comfort goal, SOB, chest pain, ambulating out of bed, or for any further assistance. Fall precautions in place, call light within reach. Will continue with care.

## 2017-02-16 NOTE — DIETARY
Nutrition Services follow-up:  Diet Rx:  Regular.  Patient with good PO intake as evidenced by intake of % of meals.  Nutrition available prn.

## 2017-02-16 NOTE — PROGRESS NOTES
Talked to pt.at length that when she is calling Homecare agencies that she has to be truthful about her living situation as agencies are calling here also and verifying her information.Agreed and verbalized understanding.

## 2017-02-16 NOTE — CARE PLAN
Problem: Safety  Goal: Will remain free from injury  Outcome: PROGRESSING AS EXPECTED  Pt in bed, turned frequently.  Bed locked and low, controls on call light button within reach.   Pt calls for needs. [facility]    Problem: Discharge Barriers/Planning  Goal: Patient’s continuum of care needs will be met  Outcome: PROGRESSING SLOWER THAN EXPECTED  Pt still trying to contact for someone who can take care of her,  Has refused to be in a SNF and insist that she wants to go back home.   Pt is also set that she only needs 2hrs in the day and 2hrs at night of caregiver help.     Problem: Mobility  Goal: Risk for activity intolerance will decrease  Outcome: PROGRESSING SLOWER THAN EXPECTED  Pt encouraged to call for needs to bathroom.  Pt is now continent of both bowels and urine and is calling for help to bedside commode.

## 2017-02-16 NOTE — PROGRESS NOTES
"Virginia with [employer] [facility] [place] in to see pt. RN, June PARIS, and and [place] spoke regarding pt's d/c plan. Virginia states that she will be in to speak with pt to try and enroll pt for their services, and that pt was truthful and stated that she her house needs \"a lot of work.\" June PARIS spoke with Jazlyn GORDILLO to see if transportation could be arranged if pt is able to leave for home tomorrow with caregiver at her home. If pt is accepted with caregiver services, pt can be escorted into taxi by hospital staff and caregiver services will be able to meet pt at her home and transfer her out of the taxi. Awaiting updates from Virginia with [employer] [facility] [place].  "

## 2017-02-16 NOTE — PROGRESS NOTES
Pt talking on phone, pt's linens soaked in urine. Pt states she tried to call RN and CNA into the room as she tried to hold it in, but was on phone. Pt cleaned and linens changed. No further needs at this time, will continue with care.

## 2017-02-16 NOTE — CARE PLAN
Problem: Nutritional:  Goal: Achieve adequate nutritional intake  Patient will consume 50% of meals   Outcome: MET Date Met:  02/16/17

## 2017-02-17 VITALS
HEART RATE: 93 BPM | RESPIRATION RATE: 18 BRPM | HEIGHT: 67 IN | BODY MASS INDEX: 16.17 KG/M2 | DIASTOLIC BLOOD PRESSURE: 76 MMHG | OXYGEN SATURATION: 95 % | SYSTOLIC BLOOD PRESSURE: 117 MMHG | WEIGHT: 103 LBS | TEMPERATURE: 98.2 F

## 2017-02-17 PROBLEM — E87.1 CHRONIC HYPONATREMIA: Status: RESOLVED | Noted: 2017-02-09 | Resolved: 2017-02-17

## 2017-02-17 LAB
BASOPHILS # BLD AUTO: 0.9 % (ref 0–1.8)
BASOPHILS # BLD: 0.04 K/UL (ref 0–0.12)
EOSINOPHIL # BLD AUTO: 0.14 K/UL (ref 0–0.51)
EOSINOPHIL NFR BLD: 3 % (ref 0–6.9)
ERYTHROCYTE [DISTWIDTH] IN BLOOD BY AUTOMATED COUNT: 47.8 FL (ref 35.9–50)
HCT VFR BLD AUTO: 32.9 % (ref 37–47)
HGB BLD-MCNC: 11.1 G/DL (ref 12–16)
IMM GRANULOCYTES # BLD AUTO: 0.02 K/UL (ref 0–0.11)
IMM GRANULOCYTES NFR BLD AUTO: 0.4 % (ref 0–0.9)
LYMPHOCYTES # BLD AUTO: 1.18 K/UL (ref 1–4.8)
LYMPHOCYTES NFR BLD: 25.1 % (ref 22–41)
MCH RBC QN AUTO: 33.8 PG (ref 27–33)
MCHC RBC AUTO-ENTMCNC: 33.7 G/DL (ref 33.6–35)
MCV RBC AUTO: 100.3 FL (ref 81.4–97.8)
MONOCYTES # BLD AUTO: 0.54 K/UL (ref 0–0.85)
MONOCYTES NFR BLD AUTO: 11.5 % (ref 0–13.4)
NEUTROPHILS # BLD AUTO: 2.78 K/UL (ref 2–7.15)
NEUTROPHILS NFR BLD: 59.1 % (ref 44–72)
NRBC # BLD AUTO: 0 K/UL
NRBC BLD AUTO-RTO: 0 /100 WBC
PLATELET # BLD AUTO: 327 K/UL (ref 164–446)
PMV BLD AUTO: 8.9 FL (ref 9–12.9)
RBC # BLD AUTO: 3.28 M/UL (ref 4.2–5.4)
WBC # BLD AUTO: 4.7 K/UL (ref 4.8–10.8)

## 2017-02-17 PROCEDURE — A9270 NON-COVERED ITEM OR SERVICE: HCPCS | Performed by: INTERNAL MEDICINE

## 2017-02-17 PROCEDURE — 36415 COLL VENOUS BLD VENIPUNCTURE: CPT

## 2017-02-17 PROCEDURE — 700111 HCHG RX REV CODE 636 W/ 250 OVERRIDE (IP): Performed by: INTERNAL MEDICINE

## 2017-02-17 PROCEDURE — 700112 HCHG RX REV CODE 229: Performed by: INTERNAL MEDICINE

## 2017-02-17 PROCEDURE — G0378 HOSPITAL OBSERVATION PER HR: HCPCS

## 2017-02-17 PROCEDURE — 700102 HCHG RX REV CODE 250 W/ 637 OVERRIDE(OP): Performed by: INTERNAL MEDICINE

## 2017-02-17 PROCEDURE — 99217 PR OBSERVATION CARE DISCHARGE: CPT | Performed by: INTERNAL MEDICINE

## 2017-02-17 PROCEDURE — 85025 COMPLETE CBC W/AUTO DIFF WBC: CPT

## 2017-02-17 RX ORDER — ONDANSETRON 4 MG/1
4 TABLET, ORALLY DISINTEGRATING ORAL EVERY 8 HOURS PRN
Qty: 10 TAB | Refills: 0 | Status: SHIPPED | OUTPATIENT
Start: 2017-02-17

## 2017-02-17 RX ORDER — BISACODYL 10 MG
10 SUPPOSITORY, RECTAL RECTAL
Qty: 10 SUPPOSITORY | Refills: 0 | Status: SHIPPED | OUTPATIENT
Start: 2017-02-17

## 2017-02-17 RX ORDER — POLYETHYLENE GLYCOL 3350 17 G/17G
17 POWDER, FOR SOLUTION ORAL DAILY
Qty: 14 EACH | Refills: 0 | Status: SHIPPED | OUTPATIENT
Start: 2017-02-17

## 2017-02-17 RX ADMIN — HYDROCODONE BITARTRATE AND ACETAMINOPHEN 1 TABLET: 10; 325 TABLET ORAL at 03:57

## 2017-02-17 RX ADMIN — HYDROCODONE BITARTRATE AND ACETAMINOPHEN 1 TABLET: 10; 325 TABLET ORAL at 08:04

## 2017-02-17 RX ADMIN — HYDROCODONE BITARTRATE AND ACETAMINOPHEN 1 TABLET: 10; 325 TABLET ORAL at 12:25

## 2017-02-17 RX ADMIN — HYDROCODONE BITARTRATE AND ACETAMINOPHEN 1 TABLET: 10; 325 TABLET ORAL at 00:10

## 2017-02-17 RX ADMIN — POLYETHYLENE GLYCOL 3350 1 PACKET: 17 POWDER, FOR SOLUTION ORAL at 08:04

## 2017-02-17 RX ADMIN — ONDANSETRON 4 MG: 4 TABLET, ORALLY DISINTEGRATING ORAL at 04:03

## 2017-02-17 RX ADMIN — DOCUSATE SODIUM 100 MG: 100 CAPSULE, LIQUID FILLED ORAL at 08:04

## 2017-02-17 RX ADMIN — ONDANSETRON 4 MG: 4 TABLET, ORALLY DISINTEGRATING ORAL at 12:25

## 2017-02-17 RX ADMIN — TEMAZEPAM 15 MG: 15 CAPSULE ORAL at 08:07

## 2017-02-17 RX ADMIN — ENOXAPARIN SODIUM 30 MG: 100 INJECTION SUBCUTANEOUS at 08:04

## 2017-02-17 ASSESSMENT — PAIN SCALES - GENERAL
PAINLEVEL_OUTOF10: 10
PAINLEVEL_OUTOF10: 0
PAINLEVEL_OUTOF10: 0
PAINLEVEL_OUTOF10: 8
PAINLEVEL_OUTOF10: 10

## 2017-02-17 NOTE — DISCHARGE PLANNING
Pt is wanting to d.c home now.  WELLINGTON called Virginia with [facility] at [phone] and asked to speak with Virginia.  WELLINGTON Lr left a voicemail requesting that someone meet pt at her home as pt wishes to d.c home now.  WELLINGTON requested a call back and left her return number.

## 2017-02-17 NOTE — PROGRESS NOTES
Jaclyn with [facility] here to  pt and take her home. Jaclyn drove to  pt, up in room, and will be able to transfer her from car to wheelchair at pt's house. Jaclyn states that Marla will be by pt's home at 1730 to assume care. Spoke with Adeline PARIS and updated on POC. Pt requesting to leave with Jaclyn at this time, d/c orders in place by MD.

## 2017-02-17 NOTE — PROGRESS NOTES
MD in to see pt this AM; d/c orders received. Pt changed into clothing with assistance. Discharge instructions given; pt and Jaclyn caregiver verbalized understanding and questions answered. Prescriptions with pt. Pt dc'd in w/c with hospital escort at 1330.

## 2017-02-17 NOTE — DISCHARGE PLANNING
WELLINGTON Lr was informed by bs JONN Tapia that a woman named Jcalyn with [facility] is here to  pt in her own car and bring pt home.  Jaclyn will bring pt home and stated that Marla will be by around 1730 tonight.  WELLINGTON called [facility] [phone] and left a voicemail for [place].  WELLINGTON advised JONN Tapia that pt is deemed competent to make her own decisions and if she wishes to leave with Jaclyn, there is no reason to wait for verification from [facility].

## 2017-02-17 NOTE — PROGRESS NOTES
Received report from Rafael LUNSFORD, assumed pt care. Pt A&Ox4, sitting in bed. Pt soaked in urine, RN told pt that she is able to ambulate up to commode and use the bathroom and that there is no reason she should be urinating in bed. Pt only uses call light to call staff for pain medications, not for bathroom needs. RN states that pt will be ambulting up to bathroom every two hours for bathroom needs. Pt taught to inform nurse if experiencing pain above comfort goal, SOB, chest pain, ambulating out of bed, or for any further assistance. Fall precautions in place, call light within reach. Will continue with care.

## 2017-02-17 NOTE — DISCHARGE PLANNING
WELLINGTON Lr received a call from Savanna with [employer] who gave SW a phone number that can assist pt with the cost of caregivers through her insurance [phone] option 1 then option 4.  WELLINGTON typed up a note and will give to bs RN at rounds.

## 2017-02-17 NOTE — CARE PLAN
Problem: Safety  Goal: Will remain free from injury  Outcome: PROGRESSING AS EXPECTED  PT WILL BE FREE FROM INJURY, INSTRUCTION FOR USE OF CALL/BR CALL LIGHT GIVEN.    Problem: Knowledge Deficit  Goal: Knowledge of disease process/condition, treatment plan, diagnostic tests, and medications will improve  Outcome: PROGRESSING AS EXPECTED  PT AND FAMILY MEMBER  UPDATED ON PTS PLAN OF CARE,NURSES COMMUNICATIONS WITH DOCTORS.

## 2017-02-17 NOTE — DISCHARGE PLANNING
Per report from WELLINGTON Watkins, [employer] [facility] has agreed to meet pt at home upon discharge, get keys to pt's home, get wheelchair and assist pt in getting into the home, cleaning and any other caregiver services that are needed.

## 2017-02-17 NOTE — DISCHARGE INSTRUCTIONS
Discharge Instructions    Discharged to home by taxi with escort. Discharged via wheelchair, hospital escort: Yes.  Special equipment needed: Not Applicable    Be sure to schedule a follow-up appointment with your primary care doctor or any specialists as instructed.     Discharge Plan:   Influenza Vaccine Indication: Patient Refuses    I understand that a diet low in cholesterol, fat, and sodium is recommended for good health. Unless I have been given specific instructions below for another diet, I accept this instruction as my diet prescription.   Other diet: regular    Special Instructions: None    · Is patient discharged on Warfarin / Coumadin?   No     · Is patient Post Blood Transfusion?  No    Depression / Suicide Risk    As you are discharged from this [facility] facility, it is important to learn how to keep safe from harming yourself.    Recognize the warning signs:  · Abrupt changes in personality, positive or negative- including increase in energy   · Giving away possessions  · Change in eating patterns- significant weight changes-  positive or negative  · Change in sleeping patterns- unable to sleep or sleeping all the time   · Unwillingness or inability to communicate  · Depression  · Unusual sadness, discouragement and loneliness  · Talk of wanting to die  · Neglect of personal appearance   · Rebelliousness- reckless behavior  · Withdrawal from people/activities they love  · Confusion- inability to concentrate     If you or a loved one observes any of these behaviors or has concerns about self-harm, here's what you can do:  · Talk about it- your feelings and reasons for harming yourself  · Remove any means that you might use to hurt yourself (examples: pills, rope, extension cords, firearm)  · Get professional help from the community (Mental Health, Substance Abuse, psychological counseling)  · Do not be alone:Call your Safe Contact- someone whom you trust who will be there for you.  · Call your  local CRISIS HOTLINE 640-7345 or 220-553-5537  · Call your local Children's Mobile Crisis Response Team [place] (471) 441-7262 or www.[employer]  · Call the toll free National Suicide Prevention Hotlines   · National Suicide Prevention Lifeline 738-612-YJZQ (6187)  · National Roadtrippers Line Network 800-SUICIDE (178-4991)

## 2017-02-17 NOTE — DISCHARGE SUMMARY
HOSPITAL MEDICINE DISCHARGE SUMMARY    Name: Fabiola Chacon  MRN: [id number]  : 1955  Admit Date: 2017  Discharge Date: 2017  Attending Provider: Vern Marie M.D.  Primary Care Physician: Saud Cheng M.D.    DISCHARGE DIAGNOSES:   Active Problems:    Insomnia POA: Yes    FTT (failure to thrive) in adult POA: Yes    Constipation due to opioid therapy POA: Yes    Opiate dependence (CMS-[facility]) POA: Yes    Seizure disorder (CMS-[facility]) POA: Yes    Scoliosis POA: Yes  Resolved Problems:    Chronic hyponatremia POA: Yes        SUMMARY OF EVENTS LEADING TO ADMISSION:   60 yo woman admitted on 17 because of failure to thrive after she was recently discharged form [facility]     For further details of history of present illness, past medical/social/family histories, allergies and medication, please refer to copy of admission note by Dr. Ventura  on 17.     HOSPITAL COURSE:   The patient was admitted to the hospitalist service after being initially evaluated in the ED because of failure to thrive. Pt was unable to take care of herself at home. The nursing staff and  said  that her house was quite dirty with feces all over the place. On admission, she was anxious and constipated. She was given Miralax, senokot and dulcolax with good results. She has chronic back pain and insomnia. As an outpt she was getting Norco and restoril. These meds were continued. Prior to her discharge from [facility] last week, she received a rx for these medications. The pt said that her neighbor filled her prescription but she has not used any of them. We contacted the office of the family physician. We were told that she has an appt to be seenthere on  next week. The pt has enough pain meds to last her until she is seen in the PCP office. The pt arranged for a private pay agency to see her 2x a day. Our  is also trying to help her get a government program to help her with  the costs.     With her clinical improvement, she was deemed ready to be discharged from the hospital as she did not have any further inpatient needs. Patient felt comfortable going home. The discharge plan was discussed with the patient, and she was agreeable to it.     The patient was subsequently sent home in improved and stable condition.     FOLLOW-UP ISSUES:   Pain management    CHANGES IN MANAGEMENT OF CHRONIC CONDITION: As above.       DISCHARGE MEDICATIONS:   Prior to Admission medications    Medication Sig Start Date End Date Taking? Authorizing Provider   bisacodyl (DULCOLAX) 10 MG Suppos Insert 1 Suppository in rectum every 24 hours as needed (if lactulose ineffective). 2/17/17  Yes Vern Marie M.D.   polyethylene glycol/lytes (MIRALAX) Pack Take 1 Packet by mouth every day. 2/17/17  Yes Vern Marie M.D.   ondansetron (ZOFRAN ODT) 4 MG TABLET DISPERSIBLE Take 1 Tab by mouth every 8 hours as needed for Nausea/Vomiting (give PO if no IV route available). 2/17/17  Yes Vern Marie M.D.   hydrocodone/acetaminophen (NORCO)  MG Tab Take 1-2 Tabs by mouth every 6 hours as needed. 2/8/17   Kenny Calvo M.D.   temazepam (RESTORIL) 30 MG capsule Take 30 mg by mouth at bedtime as needed for Sleep.    [facility] Triage Protocol, M.D.   acetaminophen 650 MG TABS Take 650 mg by mouth every 6 hours as needed for Fever or Mild Pain (Temp greater than 100.5 F or Mild Pain (1-3)). 9/13/13   Devin Lara M.D.          FOLLOW-UP APPOINTMENTS:   1. Dr Saud Cheng M.D. Tuesday next week at 4:20 PM      For further details on discharge medications, patient education, diet, and activity, please refer to electronic copy of discharge instructions.       TIME SPENT: 38 minutes, with greater than 50% of the time spent on face-to-face encounter, addressing medical issues, coordination of care, counseling, discharge planning, medication reconciliation, and documentation.

## 2017-02-17 NOTE — PROGRESS NOTES
Dr. Marie in to speak with pt. Pt has pain medication prescription filled by neighbor after being d/c'd from [facility] prior to being admitted to [facility], and is enough to last her until Tuesday 2/21. Call to Dr. Meza office, pt already has established appointment on 2/21 at 1620. PCP to f/u with medication refills, Dr. Marie aware.

## 2019-05-29 NOTE — CARE PLAN
Problem: Knowledge Deficit  Goal: Knowledge of disease process/condition, treatment plan, diagnostic tests, and medications will improve  Outcome: PROGRESSING AS EXPECTED  Discussed POC for shift. Discussed disease process and treatments in place/ordered.Pt verbalized understanding. All questions answered at this time.         Detail Level: Simple Additional Notes: Dr. Delaney information given to pt

## 2021-03-03 DIAGNOSIS — Z23 NEED FOR VACCINATION: ICD-10-CM

## 2021-03-31 NOTE — ED NOTES
House sup called to bring Restoril.    Patient returned call and stated he had called the wrong clinic because he had thought the scan today was for Dr. Sanchez but it was for Dr. Hahn, writer let him know that their office will call him about the results it would not be ours. Patient verbalized understanding.
